# Patient Record
Sex: FEMALE | Race: WHITE | NOT HISPANIC OR LATINO | Employment: OTHER | URBAN - METROPOLITAN AREA
[De-identification: names, ages, dates, MRNs, and addresses within clinical notes are randomized per-mention and may not be internally consistent; named-entity substitution may affect disease eponyms.]

---

## 2017-01-12 ENCOUNTER — TELEPHONE (OUTPATIENT)
Dept: INTERNAL MEDICINE | Facility: CLINIC | Age: 67
End: 2017-01-12

## 2017-01-12 RX ORDER — CIPROFLOXACIN 500 MG/1
500 TABLET, FILM COATED ORAL 2 TIMES DAILY
Qty: 14 TABLET | Refills: 0 | Status: SHIPPED | OUTPATIENT
Start: 2017-01-12 | End: 2017-01-19

## 2017-01-12 NOTE — TELEPHONE ENCOUNTER
Pt called to say she is having a flare up of her diverticulitis with lower left quad pain with low grade fever. She would like you to call something in for her. i encouraged her to go to UC but she wanted to hear from you first.

## 2017-01-12 NOTE — TELEPHONE ENCOUNTER
Per Dr Gutierrez  Pt was given cipro 500 #14 and an appt for Friday at 7.30am  Pt informed and understands

## 2017-01-13 ENCOUNTER — OFFICE VISIT (OUTPATIENT)
Dept: INTERNAL MEDICINE | Facility: CLINIC | Age: 67
End: 2017-01-13

## 2017-01-13 VITALS
BODY MASS INDEX: 19.02 KG/M2 | HEART RATE: 68 BPM | OXYGEN SATURATION: 98 % | WEIGHT: 104 LBS | SYSTOLIC BLOOD PRESSURE: 112 MMHG | DIASTOLIC BLOOD PRESSURE: 70 MMHG

## 2017-01-13 DIAGNOSIS — K57.92 DIVERTICULITIS OF INTESTINE WITHOUT PERFORATION OR ABSCESS WITHOUT BLEEDING, UNSPECIFIED PART OF INTESTINAL TRACT: Primary | ICD-10-CM

## 2017-01-13 DIAGNOSIS — Z00.00 HEALTHCARE MAINTENANCE: Primary | ICD-10-CM

## 2017-01-13 LAB
HCT VFR BLDA CALC: 40.1 %
HGB BLDA-MCNC: 12.7 G/DL
LYMPHOCYTES # BLD: 23.2 %
MCH, POC: 29.5
MCHC, POC: 31.8
MCV, POC: 92.8
MONOCYTES # BLD: 5.8 %
PLATELET # BLD: 281 10*3/MM3
PMV BLD: 7.4 FL
POC NEUTROPHIL: 71 %
RBC, POC: 4.32
RDW, POC: 13.4
WBC # BLD: 9.1 10*3/UL

## 2017-01-13 PROCEDURE — 36415 COLL VENOUS BLD VENIPUNCTURE: CPT | Performed by: INTERNAL MEDICINE

## 2017-01-13 PROCEDURE — 99213 OFFICE O/P EST LOW 20 MIN: CPT | Performed by: INTERNAL MEDICINE

## 2017-01-13 PROCEDURE — 85025 COMPLETE CBC W/AUTO DIFF WBC: CPT | Performed by: INTERNAL MEDICINE

## 2017-01-13 NOTE — MR AVS SNAPSHOT
Eve Hurley   1/13/2017 2:00 PM   Office Visit    Dept Phone:  [phone]   Encounter #:  [id number]    Provider:  Gladys Gutierrez MD   Department:  [facility] INTERNAL MEDICINE                Your Full Care Plan              Your Updated Medication List          This list is accurate as of: 1/13/17  8:26 AM.  Always use your most recent med list.                acetaminophen 500 MG tablet   Commonly known as:  TYLENOL       ANALPRAM HC 2.5-1 % rectal cream   Generic drug:  hydrocortisone-pramoxine   INSERT INTO THE RECTUM 3 (THREE) TIMES A DAY.       calcium carbonate  MG chewable tablet   Commonly known as:  TUMS EX       CALTRATE 600+D PLUS MINERALS PO       ciprofloxacin 500 MG tablet   Commonly known as:  CIPRO   Take 1 tablet by mouth 2 (Two) Times a Day for 7 days.       denosumab 60 MG/ML solution syringe   Commonly known as:  PROLIA       desonide 0.05 % cream   Commonly known as:  DESOWEN   Apply  topically 2 (two) times a day. Prn vaginal dryness       Flaxseed Oil 1000 MG capsule       levothyroxine 75 MCG tablet   Commonly known as:  SYNTHROID, LEVOTHROID       MULTIVITAMINS PO       polycarbophil 625 MG tablet   Commonly known as:  FIBERCON       Unable to find       vitamin B-12 1000 MCG tablet   Commonly known as:  CYANOCOBALAMIN       Vitamin D3 2000 UNITS capsule               You Were Diagnosed With        Codes Comments    Diverticulitis of intestine without perforation or abscess without bleeding, unspecified part of intestinal tract    -  Primary ICD-10-CM: K57.92  ICD-9-CM: 562.11       Instructions     None    Patient Instructions History      Upcoming Appointments     Visit Type Date Time Department    OFFICE VISIT 1/13/2017  2:00 PM [facility]    [facility] 3/17/2017 10:00 AM [facility]    PHYSICAL 3/24/2017  3:30 PM [facility]    INJECTION 4/27/2017  8:30 AM  [facility] INFU NON ONC      MyChart Signup     Our records indicate  that you have an active [facility] [employer] account.    You can view your After Visit Summary by going to Meta and logging in with your [employer] username and password.  If you don't have a [employer] username and password but a parent or guardian has access to your record, the parent or guardian should login with their own [employer] username and password and access your record to view the After Visit Summary.    If you have questions, you can email [email or web address] or call [phone] to talk to our [employer] staff.  Remember, [employer] is NOT to be used for urgent needs.  For medical emergencies, dial 911.               Other Info from Your Visit           Your Appointments     Jan 13, 2017  2:00 PM EST   Office Visit with Gladys Gutierrez MD   [facility] INTERNAL MEDICINE (--)    [address] [id number] [phone]           Arrive 15 minutes prior to appointment.            Mar 17, 2017 10:00 AM EDT   [facility] with [facility] INTERNAL MEDICINE (--)    [address] [address] [phone]            Mar 24, 2017  3:30 PM EDT   Physical with Gladys Gutierrez MD   [facility] INTERNAL MEDICINE (--)    [address] [address] [phone]           Arrive 15 minutes prior to appointment.            Apr 27, 2017  8:30 AM EDT   Injection with ROOM 2 [facility]    [address] [facility] [address] [phone]              Allergies     Penicillins      Sulfa Antibiotics        Reason for Visit     Diverticulitis           Vital Signs     Blood Pressure Pulse Weight Oxygen Saturation Body Mass Index Smoking Status    112/70 68 104 lb (47.2 kg) 98% 19.02 kg/m2 Former Smoker      Problems and Diagnoses Noted     Diverticulitis of intestine without perforation or abscess without bleeding

## 2017-03-17 DIAGNOSIS — Z00.00 HEALTH CARE MAINTENANCE: Primary | ICD-10-CM

## 2017-03-18 LAB
ALBUMIN SERPL-MCNC: 4.7 G/DL (ref 3.5–5.2)
ALBUMIN/GLOB SERPL: 2.1 G/DL
ALP SERPL-CCNC: 34 U/L (ref 39–117)
ALT SERPL-CCNC: 16 U/L (ref 1–33)
APPEARANCE UR: CLEAR
AST SERPL-CCNC: 22 U/L (ref 1–32)
BACTERIA #/AREA URNS HPF: NORMAL /HPF
BASOPHILS # BLD AUTO: 0.06 10*3/MM3 (ref 0–0.2)
BASOPHILS NFR BLD AUTO: 0.7 % (ref 0–1.5)
BILIRUB SERPL-MCNC: 0.4 MG/DL (ref 0.1–1.2)
BILIRUB UR QL STRIP: NEGATIVE
BUN SERPL-MCNC: 8 MG/DL (ref 8–23)
BUN/CREAT SERPL: 8.5 (ref 7–25)
CALCIUM SERPL-MCNC: 10.1 MG/DL (ref 8.6–10.5)
CHLORIDE SERPL-SCNC: 101 MMOL/L (ref 98–107)
CHOLEST SERPL-MCNC: 206 MG/DL (ref 0–200)
CO2 SERPL-SCNC: 26.6 MMOL/L (ref 22–29)
COLOR UR: YELLOW
CREAT SERPL-MCNC: 0.94 MG/DL (ref 0.57–1)
EOSINOPHIL # BLD AUTO: 0.25 10*3/MM3 (ref 0–0.7)
EOSINOPHIL NFR BLD AUTO: 2.8 % (ref 0.3–6.2)
EPI CELLS #/AREA URNS HPF: NORMAL /HPF
ERYTHROCYTE [DISTWIDTH] IN BLOOD BY AUTOMATED COUNT: 13.3 % (ref 11.7–13)
GLOBULIN SER CALC-MCNC: 2.2 GM/DL
GLUCOSE SERPL-MCNC: 83 MG/DL (ref 65–99)
GLUCOSE UR QL: NEGATIVE
HCT VFR BLD AUTO: 42.4 % (ref 35.6–45.5)
HDLC SERPL-MCNC: 125 MG/DL (ref 40–60)
HGB BLD-MCNC: 13.8 G/DL (ref 11.9–15.5)
HGB UR QL STRIP: NEGATIVE
IMM GRANULOCYTES # BLD: 0.03 10*3/MM3 (ref 0–0.03)
IMM GRANULOCYTES NFR BLD: 0.3 % (ref 0–0.5)
KETONES UR QL STRIP: NEGATIVE
LDLC SERPL CALC-MCNC: 66 MG/DL (ref 0–100)
LEUKOCYTE ESTERASE UR QL STRIP: NEGATIVE
LYMPHOCYTES # BLD AUTO: 2.86 10*3/MM3 (ref 0.9–4.8)
LYMPHOCYTES NFR BLD AUTO: 31.8 % (ref 19.6–45.3)
MCH RBC QN AUTO: 31.2 PG (ref 26.9–32)
MCHC RBC AUTO-ENTMCNC: 32.5 G/DL (ref 32.4–36.3)
MCV RBC AUTO: 95.7 FL (ref 80.5–98.2)
MICRO URNS: ABNORMAL
MICRO URNS: ABNORMAL
MONOCYTES # BLD AUTO: 0.58 10*3/MM3 (ref 0.2–1.2)
MONOCYTES NFR BLD AUTO: 6.4 % (ref 5–12)
NEUTROPHILS # BLD AUTO: 5.22 10*3/MM3 (ref 1.9–8.1)
NEUTROPHILS NFR BLD AUTO: 58 % (ref 42.7–76)
NITRITE UR QL STRIP: NEGATIVE
PH UR STRIP: 7 [PH] (ref 5–7.5)
PLATELET # BLD AUTO: 351 10*3/MM3 (ref 140–500)
POTASSIUM SERPL-SCNC: 4.2 MMOL/L (ref 3.5–5.2)
PROT SERPL-MCNC: 6.9 G/DL (ref 6–8.5)
PROT UR QL STRIP: NEGATIVE
RBC # BLD AUTO: 4.43 10*6/MM3 (ref 3.9–5.2)
RBC #/AREA URNS HPF: NORMAL /HPF
SODIUM SERPL-SCNC: 142 MMOL/L (ref 136–145)
SP GR UR: <=1.005 (ref 1–1.03)
TRIGL SERPL-MCNC: 74 MG/DL (ref 0–150)
TSH SERPL DL<=0.005 MIU/L-ACNC: 1.53 MIU/ML (ref 0.27–4.2)
URINALYSIS REFLEX: ABNORMAL
UROBILINOGEN UR STRIP-MCNC: 0.2 MG/DL (ref 0.2–1)
VLDLC SERPL CALC-MCNC: 14.8 MG/DL (ref 5–40)
WBC # BLD AUTO: 9 10*3/MM3 (ref 4.5–10.7)
WBC #/AREA URNS HPF: NORMAL /HPF

## 2017-03-20 NOTE — PROGRESS NOTES
Your laboratory results are NORMAL. We will discuss these results in detail at your next office visit. Please call with any questions or concerns.  Sincerely,  Gladys Gutierrez MD

## 2017-03-24 ENCOUNTER — OFFICE VISIT (OUTPATIENT)
Dept: INTERNAL MEDICINE | Facility: CLINIC | Age: 67
End: 2017-03-24

## 2017-03-24 VITALS
BODY MASS INDEX: 19.32 KG/M2 | HEART RATE: 76 BPM | OXYGEN SATURATION: 99 % | HEIGHT: 62 IN | WEIGHT: 105 LBS | SYSTOLIC BLOOD PRESSURE: 112 MMHG | DIASTOLIC BLOOD PRESSURE: 60 MMHG

## 2017-03-24 DIAGNOSIS — E03.9 HYPOTHYROIDISM, UNSPECIFIED TYPE: Primary | ICD-10-CM

## 2017-03-24 DIAGNOSIS — M54.2 NECK PAIN: ICD-10-CM

## 2017-03-24 DIAGNOSIS — Z00.00 HEALTHCARE MAINTENANCE: ICD-10-CM

## 2017-03-24 PROCEDURE — 99397 PER PM REEVAL EST PAT 65+ YR: CPT | Performed by: INTERNAL MEDICINE

## 2017-03-24 PROCEDURE — 93000 ELECTROCARDIOGRAM COMPLETE: CPT | Performed by: INTERNAL MEDICINE

## 2017-03-24 RX ORDER — DESONIDE 0.5 MG/G
CREAM TOPICAL 2 TIMES DAILY
Qty: 15 G | Refills: 3 | Status: SHIPPED | OUTPATIENT
Start: 2017-03-24 | End: 2017-07-18

## 2017-03-24 NOTE — PROGRESS NOTES
Subjective     Eve Hurley is a 67 y.o. female who presents for a complete physical exam.  She is doing well today. She has a history of hypothyroidism, htn, and osteoporosis. Thyroid is now euthyroid. She is on prolia for osteoporosis and is consuming vit d and calcium in her diet. She does engage in weight bearing exercises. She has some mild neck discomfort that is most notable while in bed but absent when doing yoga. bp is normotensive.       Review of Systems   Constitutional: Negative.    HENT: Negative.    Eyes: Negative.    Respiratory: Negative.    Cardiovascular: Negative.    Gastrointestinal: Negative.    Endocrine: Negative.    Genitourinary: Negative.    Musculoskeletal: Positive for arthralgias and neck pain.        Tylenol qd for bursitis hip and other arthralgias and does well w/ tyl and yoga   Skin: Negative.    Allergic/Immunologic: Negative.    Neurological: Negative.    Hematological: Negative.    Psychiatric/Behavioral: Negative.        The following portions of the patient's history were reviewed and updated as appropriate: allergies, current medications, past family history, past medical history, past social history, past surgical history and problem list.     Patient Active Problem List   Diagnosis   • Hypothyroidism   • Arthralgia of hip   • Osteoporosis   • Hearing difficulty   • Rectal bleeding   • Diverticulitis of intestine without perforation or abscess without bleeding       Past Medical History:   Diagnosis Date   • Disease of thyroid gland    • Hypertension    • Osteoporosis        Past Surgical History:   Procedure Laterality Date   •  SECTION     • HEMORRHOIDECTOMY     • TUBAL ABDOMINAL LIGATION         Family History   Problem Relation Age of Onset   • Coronary artery disease Father    • Hyperthyroidism Brother    • Diabetes Daughter    • Coronary artery disease Maternal Grandmother        Social History     Social History   • Marital status:      Spouse name:  "N/A   • Number of children: N/A   • Years of education: N/A     Occupational History   • Not on file.     Social History Main Topics   • Smoking status: Former Smoker   • Smokeless tobacco: Never Used   • Alcohol use 1.2 oz/week     2 Glasses of wine per week   • Drug use: No   • Sexual activity: Not on file     Other Topics Concern   • Not on file     Social History Narrative       Current Outpatient Prescriptions on File Prior to Visit   Medication Sig Dispense Refill   • calcium carbonate EX (TUMS EX) 750 MG chewable tablet Chew 750 mg 4 (four) times a day.     • Calcium Carbonate-Vit D-Min (CALTRATE 600+D PLUS MINERALS PO) Take 1,600 Units by mouth daily.     • denosumab (PROLIA) 60 MG/ML solution syringe Inject 60 mg under the skin every 6 (six) months.     • Flaxseed, Linseed, (FLAXSEED OIL) 1000 MG capsule Take  by mouth.     • levothyroxine (SYNTHROID, LEVOTHROID) 75 MCG tablet Take  by mouth.     • Multiple Vitamin (MULTIVITAMINS PO) Take  by mouth.     • polycarbophil (FIBERCON) 625 MG tablet Take 625 mg by mouth daily.     • vitamin B-12 (CYANOCOBALAMIN) 1000 MCG tablet Take  by mouth.     • acetaminophen (TYLENOL) 500 MG tablet Take 1,000 mg by mouth every day.     • ANALPRAM HC 2.5-1 % rectal cream INSERT INTO THE RECTUM 3 (THREE) TIMES A DAY. 30 g 3   • Cholecalciferol (VITAMIN D3) 2000 UNITS capsule Take  by mouth.     • desonide (DESOWEN) 0.05 % cream Apply  topically 2 (two) times a day. Prn vaginal dryness 30 g 0   • Unable to find 1 each 1 (one) time. Algae Omega 715 mg 2 per day Mon-Wed-Fri       No current facility-administered medications on file prior to visit.        Allergies   Allergen Reactions   • Penicillins    • Sulfa Antibiotics        Immunization History   Administered Date(s) Administered   • Hepatitis A 09/10/2012, 04/04/2013   • Tdap 09/10/2012   • Zoster 09/10/2012       Objective     /60  Pulse 76  Ht 62\" (157.5 cm)  Wt 105 lb (47.6 kg)  SpO2 99%  BMI 19.2 " kg/m2    Physical Exam   Constitutional: She is oriented to person, place, and time. She appears well-developed and well-nourished.   HENT:   Head: Normocephalic and atraumatic.   Right Ear: External ear normal.   Left Ear: External ear normal.   Nose: Nose normal.   Mouth/Throat: Oropharynx is clear and moist.   Eyes: Conjunctivae and EOM are normal. Pupils are equal, round, and reactive to light.   Neck: Normal range of motion. Neck supple.   Cardiovascular: Normal rate, regular rhythm and normal heart sounds.    Pulmonary/Chest: Effort normal and breath sounds normal. No respiratory distress.   Abdominal: Soft. Bowel sounds are normal.   Genitourinary: Rectal exam shows guaiac negative stool. No vaginal discharge found.   Genitourinary Comments: Vaginal atrophy and lichenification changes.   Musculoskeletal: Normal range of motion.   Neurological: She is alert and oriented to person, place, and time.   Skin: Skin is warm and dry.   Psychiatric: She has a normal mood and affect. Her behavior is normal. Judgment and thought content normal.   Nursing note and vitals reviewed.  EKG for hypothyroidism. Sinus. No st changes. Unchanged from prior tracing.       Assessment/Plan   Eve was seen today for annual exam.    Diagnoses and all orders for this visit:    Hypothyroidism, unspecified type  -     ECG 12 Lead    Healthcare maintenance    Neck pain        Discussion    Patient presents today for a CPE.  Patient follows a healthy diet.   Patient follows an adequate exercise regimen. Mammogram is up to date.   Colon cancer screening is up to date.   Immunizations are up to date.   DEXA is up to date.  prevnar today. Pneumovax in 1 year. Hep c testing w next labs. Continue healthy diet w/ routine fitness. Continue synthroid for thyroid supplementation. Continue prolia for osteoporosis and f/u w/ dr. Dupree routinely. Will forward labs which were reviewed in detail w/ patient. neck pain appears muscular in nature. She  declines a PT referral. She is advised appropriate stretching and neck positioning. She is about to retire and this may be beneficial. Will take tylenol prn. Desonide ointment for vaginal lichen. She will have derm examine as well. F/u 1 yr or prn.          Future Appointments  Date Time Provider Department Center   4/27/2017 8:30 AM ROOM 2 [facility]  [facility]ONES

## 2017-04-25 ENCOUNTER — TELEPHONE (OUTPATIENT)
Dept: INTERNAL MEDICINE | Facility: CLINIC | Age: 67
End: 2017-04-25

## 2017-04-25 DIAGNOSIS — M81.0 OSTEOPOROSIS: Primary | ICD-10-CM

## 2017-04-26 PROBLEM — M81.0 SENILE OSTEOPOROSIS: Status: ACTIVE | Noted: 2017-04-26

## 2017-04-27 ENCOUNTER — HOSPITAL ENCOUNTER (OUTPATIENT)
Dept: INFUSION THERAPY | Facility: HOSPITAL | Age: 67
Discharge: HOME | End: 2017-04-27
Admitting: INTERNAL MEDICINE

## 2017-04-27 VITALS
OXYGEN SATURATION: 100 % | BODY MASS INDEX: 19.32 KG/M2 | RESPIRATION RATE: 20 BRPM | HEIGHT: 62 IN | WEIGHT: 105 LBS | TEMPERATURE: 96.9 F | SYSTOLIC BLOOD PRESSURE: 140 MMHG | HEART RATE: 74 BPM | DIASTOLIC BLOOD PRESSURE: 74 MMHG

## 2017-04-27 DIAGNOSIS — M81.0 SENILE OSTEOPOROSIS: ICD-10-CM

## 2017-04-27 DIAGNOSIS — M81.0 OSTEOPOROSIS: Primary | ICD-10-CM

## 2017-04-27 PROCEDURE — 96401 CHEMO ANTI-NEOPL SQ/IM: CPT

## 2017-04-27 PROCEDURE — 25010000002 DENOSUMAB 60 MG/ML SOLUTION: Performed by: INTERNAL MEDICINE

## 2017-04-27 RX ADMIN — DENOSUMAB 60 MG: 60 INJECTION SUBCUTANEOUS at 08:38

## 2017-07-18 ENCOUNTER — OFFICE VISIT (OUTPATIENT)
Dept: INTERNAL MEDICINE | Facility: CLINIC | Age: 67
End: 2017-07-18

## 2017-07-18 ENCOUNTER — HOSPITAL ENCOUNTER (OUTPATIENT)
Dept: CT IMAGING | Facility: HOSPITAL | Age: 67
Discharge: HOME | End: 2017-07-18
Admitting: INTERNAL MEDICINE

## 2017-07-18 VITALS
TEMPERATURE: 97.7 F | HEART RATE: 72 BPM | OXYGEN SATURATION: 98 % | WEIGHT: 103 LBS | SYSTOLIC BLOOD PRESSURE: 112 MMHG | BODY MASS INDEX: 18.84 KG/M2 | DIASTOLIC BLOOD PRESSURE: 66 MMHG

## 2017-07-18 DIAGNOSIS — R50.9 FEVER, UNSPECIFIED FEVER CAUSE: ICD-10-CM

## 2017-07-18 DIAGNOSIS — R10.31 RIGHT LOWER QUADRANT ABDOMINAL PAIN: ICD-10-CM

## 2017-07-18 DIAGNOSIS — K57.32 DIVERTICULITIS OF LARGE INTESTINE WITHOUT PERFORATION OR ABSCESS WITHOUT BLEEDING: Primary | ICD-10-CM

## 2017-07-18 DIAGNOSIS — R93.5 ABNORMAL ABDOMINAL CT SCAN: ICD-10-CM

## 2017-07-18 DIAGNOSIS — R10.31 RIGHT LOWER QUADRANT ABDOMINAL PAIN: Primary | ICD-10-CM

## 2017-07-18 LAB
HCT VFR BLDA CALC: 41.6 %
HGB BLDA-MCNC: 13.4 G/DL
LYMPHOCYTES # BLD: 35.5 %
MCH, POC: 30.4
MCHC, POC: 32.2
MCV, POC: 94.5
MONOCYTES # BLD: 2.1 %
PLATELET # BLD: 304 10*3/MM3
PMV BLD: 7.3 FL
POC NEUTROPHIL: 62.4 %
RBC, POC: 4.4
RDW, POC: 14.1
WBC # BLD: 8.4 10*3/UL

## 2017-07-18 PROCEDURE — 85025 COMPLETE CBC W/AUTO DIFF WBC: CPT | Performed by: INTERNAL MEDICINE

## 2017-07-18 PROCEDURE — 0 DIATRIZOATE MEGLUMINE & SODIUM PER 1 ML: Performed by: INTERNAL MEDICINE

## 2017-07-18 PROCEDURE — 99214 OFFICE O/P EST MOD 30 MIN: CPT | Performed by: INTERNAL MEDICINE

## 2017-07-18 PROCEDURE — 74176 CT ABD & PELVIS W/O CONTRAST: CPT

## 2017-07-18 RX ORDER — CIPROFLOXACIN 500 MG/1
500 TABLET, FILM COATED ORAL 2 TIMES DAILY
Qty: 14 TABLET | Refills: 0 | Status: SHIPPED | OUTPATIENT
Start: 2017-07-18 | End: 2017-07-25

## 2017-07-18 RX ORDER — METRONIDAZOLE 500 MG/1
500 TABLET ORAL 3 TIMES DAILY
Qty: 21 TABLET | Refills: 0 | Status: SHIPPED | OUTPATIENT
Start: 2017-07-18 | End: 2017-08-23

## 2017-07-18 RX ADMIN — DIATRIZOATE MEGLUMINE AND DIATRIZOATE SODIUM 30 ML: 660; 100 LIQUID ORAL; RECTAL at 15:30

## 2017-07-18 NOTE — PROGRESS NOTES
Chief Complaint   Patient presents with   • Diarrhea   • Abdominal Pain   • Fever     in the evening       History of Present Illness   Eve Hurley is a 67 y.o. female presents for acute care w/ new issue. approx 10 d h/o gi discomfort. Worsening over last several days. Had 24 hours of very loose bm. Now she is having 2-3 loose bm/ d. This has improved. She is having abdominal pain diffusely in the low abdominal region LLQ greatest area of involvement. Tmax 101 and did have a fever last night. She has had tylenol today. Patient has a history of diverticulitis. Last flair was 1/17. Treated w/ cipro.       The following portions of the patient's history were reviewed and updated as appropriate: allergies, current medications, past family history, past medical history, past social history, past surgical history and problem list.  Current Outpatient Prescriptions on File Prior to Visit   Medication Sig Dispense Refill   • acetaminophen (TYLENOL) 500 MG tablet Take 1,000 mg by mouth every day.     • ANALPRAM HC 2.5-1 % rectal cream INSERT INTO THE RECTUM 3 (THREE) TIMES A DAY. 30 g 3   • calcium carbonate EX (TUMS EX) 750 MG chewable tablet Chew 750 mg 4 (four) times a day.     • Calcium Carbonate-Vit D-Min (CALTRATE 600+D PLUS MINERALS PO) Take 1,600 Units by mouth daily.     • Cholecalciferol (VITAMIN D3) 2000 UNITS capsule Take  by mouth.     • denosumab (PROLIA) 60 MG/ML solution syringe Inject 60 mg under the skin every 6 (six) months.     • Flaxseed, Linseed, (FLAXSEED OIL) 1000 MG capsule Take  by mouth.     • levothyroxine (SYNTHROID, LEVOTHROID) 75 MCG tablet Take  by mouth.     • Multiple Vitamin (MULTIVITAMINS PO) Take  by mouth.     • polycarbophil (FIBERCON) 625 MG tablet Take 625 mg by mouth daily.     • vitamin B-12 (CYANOCOBALAMIN) 1000 MCG tablet Take  by mouth.     • desonide (DESOWEN) 0.05 % cream Apply  topically 2 (two) times a day. Prn vaginal dryness 30 g 0   • Unable to find 1 each 1 (one)  time. Algae Omega 715 mg 2 per day Mon-Wed-Fri     • [DISCONTINUED] desonide (DESOWEN) 0.05 % cream Apply  topically 2 (Two) Times a Day. 15 g 3     No current facility-administered medications on file prior to visit.      Review of Systems   Constitutional: Positive for fatigue and fever.   HENT: Negative.    Eyes: Negative.    Respiratory: Negative.    Cardiovascular: Negative.    Gastrointestinal: Positive for abdominal pain.   Endocrine: Negative.    Genitourinary: Negative.    Musculoskeletal: Negative.    Skin: Negative.    Neurological: Negative.    Hematological: Negative.    Psychiatric/Behavioral: Negative.        Objective   Physical Exam   Constitutional: She is oriented to person, place, and time. She appears well-developed and well-nourished.   HENT:   Head: Normocephalic and atraumatic.   Right Ear: External ear normal.   Left Ear: External ear normal.   Nose: Nose normal.   Mouth/Throat: Oropharynx is clear and moist.   Eyes: EOM are normal. Pupils are equal, round, and reactive to light.   Neck: Neck supple.   Cardiovascular: Normal rate, regular rhythm and normal heart sounds.    Pulmonary/Chest: Effort normal and breath sounds normal. No respiratory distress.   Abdominal: Soft. Bowel sounds are normal. There is tenderness. There is no rebound and no guarding.   Tenderness in lower abdomen R>L   Musculoskeletal: Normal range of motion.   Neurological: She is alert and oriented to person, place, and time.   Skin: Skin is warm and dry.   Psychiatric: She has a normal mood and affect. Her behavior is normal. Thought content normal.   Nursing note and vitals reviewed.       /66  Pulse 72  Temp 97.7 °F (36.5 °C)  Wt 103 lb (46.7 kg)  SpO2 98%  BMI 18.84 kg/m2    Assessment/Plan   Diagnoses and all orders for this visit:    Right lower quadrant abdominal pain  -     CT Abdomen Pelvis Without Contrast; Future  -     POC CBC With / Auto Diff    Fever, unspecified fever cause  -     CT Abdomen  Pelvis Without Contrast; Future  -     POC CBC With / Auto Diff    Other orders  -     ciprofloxacin (CIPRO) 500 MG tablet; Take 1 tablet by mouth 2 (Two) Times a Day for 7 days.  -     metroNIDAZOLE (FLAGYL) 500 MG tablet; Take 1 tablet by mouth 3 (Three) Times a Day.    Abdominal pain. Likely repeat diverticulitis. Cbc is wnl. She will be sent for ct abdomen given diffuse tenderness and recurrent nature. Will treat w/ cipro and flagyl if it is diverticulitis. Will refer to surgeon for repeat cscope/ possible partial colectomy given recurrent nature.     ADDENDUM  CT abdomen verbal report w/ diverticuli and likely diverticulitis. Also noted is cecal thickeninc w/ ? Mass appearance. Patient will complete antibiotics. Possibly have repeat ct scan but will get surgical referral as discussed. Patient is aware of results and plans.

## 2017-07-24 ENCOUNTER — TELEPHONE (OUTPATIENT)
Dept: INTERNAL MEDICINE | Facility: CLINIC | Age: 67
End: 2017-07-24

## 2017-07-24 NOTE — TELEPHONE ENCOUNTER
Pt is taking Cipro and is do to stop tomorrow  Wants to stop today as the diarrhea and nausea is to much.

## 2017-08-17 ENCOUNTER — TELEPHONE (OUTPATIENT)
Dept: INTERNAL MEDICINE | Facility: CLINIC | Age: 67
End: 2017-08-17

## 2017-08-17 NOTE — TELEPHONE ENCOUNTER
Pt called to say that she had an appt with her Colon Rectal Surgeon but it had to be cancelled and now they are unsure when they can get her in.(?) they gave her an appt for 23rd of August but they told her that could change. Pt states she is afraid to keep waiting to check on the mass they found in th CT. Would like to be given another drs name. She is waiting to see Dr Dunbar.

## 2017-08-17 NOTE — TELEPHONE ENCOUNTER
Marito,  Just making sure you will be able to see patient? I think she needed to be rescheduled and is really worried about her CT findings. Thanks again.  Gladys

## 2017-08-17 NOTE — TELEPHONE ENCOUNTER
Pt informed of message and will let me know if she gets reschedules again and per Matt she will schedule with Valerie or Tanvi

## 2017-08-23 ENCOUNTER — OFFICE VISIT (OUTPATIENT)
Dept: SURGERY | Facility: CLINIC | Age: 67
End: 2017-08-23

## 2017-08-23 VITALS
HEART RATE: 97 BPM | TEMPERATURE: 98.2 F | HEIGHT: 62 IN | BODY MASS INDEX: 19.54 KG/M2 | WEIGHT: 106.2 LBS | SYSTOLIC BLOOD PRESSURE: 110 MMHG | OXYGEN SATURATION: 97 % | DIASTOLIC BLOOD PRESSURE: 74 MMHG

## 2017-08-23 DIAGNOSIS — R93.5 ABNORMAL CT OF THE ABDOMEN: ICD-10-CM

## 2017-08-23 DIAGNOSIS — K57.32 DIVERTICULITIS OF LARGE INTESTINE WITHOUT PERFORATION OR ABSCESS WITHOUT BLEEDING: Primary | ICD-10-CM

## 2017-08-23 PROCEDURE — 99204 OFFICE O/P NEW MOD 45 MIN: CPT | Performed by: COLON & RECTAL SURGERY

## 2017-08-23 RX ORDER — SODIUM CHLORIDE, SODIUM LACTATE, POTASSIUM CHLORIDE, CALCIUM CHLORIDE 600; 310; 30; 20 MG/100ML; MG/100ML; MG/100ML; MG/100ML
30 INJECTION, SOLUTION INTRAVENOUS CONTINUOUS
Status: CANCELLED | OUTPATIENT
Start: 2017-08-30

## 2017-08-23 RX ORDER — SODIUM CHLORIDE 0.9 % (FLUSH) 0.9 %
1-10 SYRINGE (ML) INJECTION AS NEEDED
Status: CANCELLED | OUTPATIENT
Start: 2017-08-30

## 2017-08-23 NOTE — PROGRESS NOTES
Eve Hurley is a 67 y.o. female who is seen as a consult at the request of Gladys Gutierrez MD for abnl CT scan    HPI:    Pt has had 4-5 episodes diverticulitis in the past 20 years    2 episodes this year, most recently 2017    She has LLQ pain, fever, bloated, crampy during the episode but resolved  She had nausea this past episode; does not usually. No vomiting  Had frequent, loose BMs for 24 hours  She thought she noted some blood per rectum this episode, but she was eating red popsicles  Low-grade fever this episode and episode at the beginning of the year    She thought it was a GI bug    Went to PCP Dr. Gutierrez, who ordered CT: showed diverticulitis, cecal abnormality, sigmoid thickening  Dr. Gutierrez prescribed flagyl    Was only admitted for diverticulitis 1st episode: -sherman: IV abx  Has been treated outpatient with antibiotics since then    Does still have occasional LLQ pain & RLQ pain: comes and goes  Most recently last week: about 24 hours    Most recent colonoscopy  Dr. 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    • Fibrocystic breast    • Hearing difficulty    • Hip joint pain    • Hypertension    • Left  lower quadrant pain 2004    CT SCAN AT [facility] SHOWED ESSENTIALLY NORMAL EXCEPT 2 VERY SMALL HEPATIC CYSTS AND CYSTIC STRUCTURE ON THE LEFT SIDE OF OF THE PELVIS REPRESENTING A 2CM OVARIAN CYST   • Lower extremity pain, left 06/15/2012    VENOUS DOPLER AT [facility] WAS WNL   • Osteoporosis     GETS PROLIA INJECTIONS   • Perforated ear drum 2014    MICROPERFORATION, LEFT EAR, SAW DR. GREGORY LAGOS   • PONV (postoperative nausea and vomiting)    • Rectal bleed 03/15/2016   • Renal insufficiency 2016   • Vegetarian     VEGAN       Past Surgical History:   Procedure Laterality Date   • BREAST SURGERY Right     CYST ASPIRATION   •  SECTION N/A    • COLONOSCOPY N/A 2004    DIVERTICULOSIS, REDUNDANT SIGMOID, DR. KELLEN ROBERTS AT [facility]   • HEMORRHOIDECTOMY N/A 2009    DR. KELLEN ROBERTS AT [facility]   • TUBAL ABDOMINAL LIGATION Bilateral        Social History:   reports that she has quit smoking. Her smoking use included Cigarettes. She has a 15.00 pack-year smoking history. She has never used smokeless tobacco. She reports that she drinks about 1.2 oz of alcohol per week  She reports that she does not use illicit drugs.    Marriage status:     Family History   Problem Relation Age of Onset   • Coronary artery disease Father    • Heart disease Father    • Hypertension Father    • Arthritis Father    • Alcohol abuse Father    • Hyperthyroidism Brother    • Thyroid disease Brother    • Diabetes Daughter    • Coronary artery disease Maternal Grandmother    • Arthritis Maternal Grandmother    • Asthma Maternal Grandmother    • ALS Mother    • Depression Mother    • Early death Mother    • Thyroid disease Sister    • Heart disease Maternal Grandfather    • Alcohol abuse Maternal Grandfather    • Arthritis Paternal Grandmother    • Diabetes Paternal Grandmother    • Alcohol abuse Paternal Grandfather    • Macular degeneration Paternal Grandfather    • Heart attack Paternal Grandfather    •  Alcohol abuse Sister    • Depression Sister          Current Outpatient Prescriptions:   •  acetaminophen (TYLENOL) 500 MG tablet, Take 1,000 mg by mouth every day., Disp: , Rfl:   •  calcium carbonate EX (TUMS EX) 750 MG chewable tablet, Chew 750 mg 4 (four) times a day., Disp: , Rfl:   •  Calcium Carbonate-Vit D-Min (CALTRATE 600+D PLUS MINERALS PO), Take 1,600 Units by mouth daily., Disp: , Rfl:   •  Cholecalciferol (VITAMIN D3) 2000 UNITS capsule, Take  by mouth., Disp: , Rfl:   •  denosumab (PROLIA) 60 MG/ML solution syringe, Inject 60 mg under the skin every 6 (six) months., Disp: , Rfl:   •  Flaxseed, Linseed, (FLAXSEED OIL) 1000 MG capsule, Take  by mouth., Disp: , Rfl:   •  levothyroxine (SYNTHROID, LEVOTHROID) 75 MCG tablet, Take  by mouth., Disp: , Rfl:   •  Multiple Vitamin (MULTIVITAMINS PO), Take  by mouth., Disp: , Rfl:   •  polycarbophil (FIBERCON) 625 MG tablet, Take 625 mg by mouth daily., Disp: , Rfl:   •  Probiotic Product (PROBIOTIC DAILY PO), , Disp: , Rfl:   •  Unable to find, 1 each 1 (one) time. Algae Omega 715 mg 2 per day Mon-Wed-Fri, Disp: , Rfl:   •  vitamin B-12 (CYANOCOBALAMIN) 1000 MCG tablet, Take  by mouth., Disp: , Rfl:     Allergy  Sulfa antibiotics and Penicillins    Review of Systems   Constitution: Positive for decreased appetite and weight loss.   HENT: Negative for headaches.    Eyes: Negative.    Cardiovascular: Negative.    Respiratory: Negative.    Endocrine: Positive for cold intolerance.   Skin: Negative.    Musculoskeletal: Positive for joint pain and myalgias.   Gastrointestinal: Positive for anorexia and change in bowel habit. Negative for constipation, flatus, melena and vomiting.   Genitourinary: Negative for dysuria, frequency and hematuria.   Neurological: Positive for light-headedness.   Psychiatric/Behavioral: Negative.    Allergic/Immunologic: Negative.        Vitals:    08/23/17 1251   BP: 110/74   Pulse: 97   Temp: 98.2 °F (36.8 °C)   SpO2: 97%     Body mass  index is 19.42 kg/(m^2).    Physical Exam   Constitutional: She is oriented to person, place, and time. She appears well-developed and well-nourished. No distress.   thin   HENT:   Head: Normocephalic and atraumatic.   Nose: Nose normal.   Mouth/Throat: Oropharynx is clear and moist.   Eyes: Conjunctivae and EOM are normal. Pupils are equal, round, and reactive to light.   Neck: Normal range of motion. No tracheal deviation present.   Pulmonary/Chest: Effort normal and breath sounds normal. No respiratory distress.   Abdominal: Soft. Bowel sounds are normal. She exhibits no distension.   Musculoskeletal: Normal range of motion. She exhibits no edema or deformity.   Neurological: She is alert and oriented to person, place, and time. No cranial nerve deficit. Coordination and gait normal.   Skin: Skin is warm and dry.   Psychiatric: She has a normal mood and affect. Her behavior is normal. Judgment normal.       Review of Medical Record:  I independently viewed CT a/p July 2017: sigmoid thickening w/ diverticulitis. cecal  thickening  Reviewed Dr. Gutierrez office notes    Assessment:  1. Diverticulitis of large intestine without perforation or abscess without bleeding    2. Abnormal CT of the abdomen        Plan:    For the abnormal CT and history of diverticulitis, I recommend doing a colonoscopy.  I described the patient risks benefits and alternatives and she wished to proceed.    I also recommend fiber therapy and detailed and gave written instructions on how to achieve a high fiber diet: she can increase fiber supplement to bid.    Scribed for Marito Dunbar MD by Rosa Jin PA-C 8/23/2017   This patient was evaluated by me, recommendations made, documentation reviewed, edited, and revised by me, Marito Dunbar MD

## 2017-08-30 ENCOUNTER — ANESTHESIA EVENT (OUTPATIENT)
Dept: GASTROENTEROLOGY | Facility: HOSPITAL | Age: 67
End: 2017-08-30

## 2017-08-30 ENCOUNTER — HOSPITAL ENCOUNTER (OUTPATIENT)
Facility: HOSPITAL | Age: 67
Setting detail: HOSPITAL OUTPATIENT SURGERY
Discharge: HOME | End: 2017-08-30
Attending: COLON & RECTAL SURGERY | Admitting: COLON & RECTAL SURGERY

## 2017-08-30 ENCOUNTER — ANESTHESIA (OUTPATIENT)
Dept: GASTROENTEROLOGY | Facility: HOSPITAL | Age: 67
End: 2017-08-30

## 2017-08-30 VITALS
DIASTOLIC BLOOD PRESSURE: 67 MMHG | TEMPERATURE: 98.2 F | HEIGHT: 62 IN | HEART RATE: 64 BPM | WEIGHT: 103 LBS | SYSTOLIC BLOOD PRESSURE: 117 MMHG | OXYGEN SATURATION: 99 % | RESPIRATION RATE: 18 BRPM | BODY MASS INDEX: 18.95 KG/M2

## 2017-08-30 DIAGNOSIS — R93.5 ABNORMAL CT OF THE ABDOMEN: ICD-10-CM

## 2017-08-30 PROCEDURE — 25010000002 PROPOFOL 10 MG/ML EMULSION: Performed by: ANESTHESIOLOGY

## 2017-08-30 PROCEDURE — 45378 DIAGNOSTIC COLONOSCOPY: CPT | Performed by: COLON & RECTAL SURGERY

## 2017-08-30 RX ORDER — SODIUM CHLORIDE 0.9 % (FLUSH) 0.9 %
1-10 SYRINGE (ML) INJECTION AS NEEDED
Status: DISCONTINUED | OUTPATIENT
Start: 2017-08-30 | End: 2017-08-30 | Stop reason: HOSPADM

## 2017-08-30 RX ORDER — SODIUM CHLORIDE, SODIUM LACTATE, POTASSIUM CHLORIDE, CALCIUM CHLORIDE 600; 310; 30; 20 MG/100ML; MG/100ML; MG/100ML; MG/100ML
30 INJECTION, SOLUTION INTRAVENOUS CONTINUOUS
Status: DISCONTINUED | OUTPATIENT
Start: 2017-08-30 | End: 2017-08-30 | Stop reason: HOSPADM

## 2017-08-30 RX ORDER — LIDOCAINE HYDROCHLORIDE 20 MG/ML
INJECTION, SOLUTION INFILTRATION; PERINEURAL AS NEEDED
Status: DISCONTINUED | OUTPATIENT
Start: 2017-08-30 | End: 2017-08-30 | Stop reason: SURG

## 2017-08-30 RX ORDER — PROPOFOL 10 MG/ML
VIAL (ML) INTRAVENOUS AS NEEDED
Status: DISCONTINUED | OUTPATIENT
Start: 2017-08-30 | End: 2017-08-30 | Stop reason: SURG

## 2017-08-30 RX ORDER — PROPOFOL 10 MG/ML
VIAL (ML) INTRAVENOUS CONTINUOUS PRN
Status: DISCONTINUED | OUTPATIENT
Start: 2017-08-30 | End: 2017-08-30 | Stop reason: SURG

## 2017-08-30 RX ADMIN — PROPOFOL 50 MG: 10 INJECTION, EMULSION INTRAVENOUS at 07:35

## 2017-08-30 RX ADMIN — PROPOFOL 140 MCG/KG/MIN: 10 INJECTION, EMULSION INTRAVENOUS at 07:34

## 2017-08-30 RX ADMIN — PROPOFOL 150 MG: 10 INJECTION, EMULSION INTRAVENOUS at 07:34

## 2017-08-30 RX ADMIN — LIDOCAINE HYDROCHLORIDE 50 MG: 20 INJECTION, SOLUTION INFILTRATION; PERINEURAL at 07:34

## 2017-08-30 RX ADMIN — PROPOFOL 50 MG: 10 INJECTION, EMULSION INTRAVENOUS at 07:40

## 2017-08-30 RX ADMIN — SODIUM CHLORIDE, POTASSIUM CHLORIDE, SODIUM LACTATE AND CALCIUM CHLORIDE 30 ML/HR: 600; 310; 30; 20 INJECTION, SOLUTION INTRAVENOUS at 06:53

## 2017-08-30 NOTE — ANESTHESIA POSTPROCEDURE EVALUATION
Patient: Eve Hurley    Procedure Summary     Date Anesthesia Start Anesthesia Stop Room / Location    08/30/17 0732 0751  [facility]       Procedure Diagnosis Surgeon Provider    COLONOSCOPY INTO CECUM (N/A ) Abnormal CT of the abdomen  (Abnormal CT of the abdomen [R93.5]) MD Jalen Sun MD          Anesthesia Type: MAC  Last vitals  BP   117/67 (08/30/17 0815)    Temp   36.8 °C (98.2 °F) (08/30/17 0755)    Pulse   64 (08/30/17 0815)   Resp   18 (08/30/17 0815)    SpO2   99 % (08/30/17 0815)      Post Anesthesia Care and Evaluation    Patient location during evaluation: PACU  Patient participation: complete - patient participated  Level of consciousness: awake and alert  Pain score: 0  Pain management: adequate  Airway patency: patent  Anesthetic complications: No anesthetic complications  PONV Status: none  Cardiovascular status: acceptable  Respiratory status: acceptable  Hydration status: acceptable

## 2017-08-30 NOTE — ANESTHESIA PREPROCEDURE EVALUATION
Anesthesia Evaluation     Patient summary reviewed          Airway   Mallampati: III  TM distance: >3 FB  Neck ROM: full  no difficulty expected  Dental - normal exam     Pulmonary     breath sounds clear to auscultation  Cardiovascular   Exercise tolerance: good (4-7 METS)    Rhythm: regular  Rate: normal        Neuro/Psych  GI/Hepatic/Renal/Endo      Musculoskeletal     Abdominal    Substance History      OB/GYN          Other                                        Anesthesia Plan    ASA 2     MAC     intravenous induction   Anesthetic plan and risks discussed with patient.

## 2017-09-10 ENCOUNTER — APPOINTMENT (OUTPATIENT)
Dept: GENERAL RADIOLOGY | Facility: HOSPITAL | Age: 67
End: 2017-09-10

## 2017-09-10 ENCOUNTER — HOSPITAL ENCOUNTER (EMERGENCY)
Facility: HOSPITAL | Age: 67
Discharge: HOME | End: 2017-09-11
Attending: EMERGENCY MEDICINE | Admitting: EMERGENCY MEDICINE

## 2017-09-10 VITALS
HEART RATE: 85 BPM | RESPIRATION RATE: 16 BRPM | SYSTOLIC BLOOD PRESSURE: 163 MMHG | TEMPERATURE: 98.1 F | BODY MASS INDEX: 21.26 KG/M2 | OXYGEN SATURATION: 98 % | WEIGHT: 120 LBS | HEIGHT: 63 IN | DIASTOLIC BLOOD PRESSURE: 90 MMHG

## 2017-09-10 DIAGNOSIS — S70.01XA CONTUSION OF RIGHT HIP, INITIAL ENCOUNTER: Primary | ICD-10-CM

## 2017-09-10 PROCEDURE — 73502 X-RAY EXAM HIP UNI 2-3 VIEWS: CPT

## 2017-09-10 PROCEDURE — 99283 EMERGENCY DEPT VISIT LOW MDM: CPT

## 2017-09-10 NOTE — ED PROVIDER NOTES
EMERGENCY DEPARTMENT ENCOUNTER    CHIEF COMPLAINT  Chief Complaint: fall, hip pain  History given by: patient  History limited by: nothing  Room Number:   PMD: Gladys Gutierrez MD      HPI:  Pt is a 67 y.o. female who presents s/p fall from a 2 foot ladder complaining of R posterior hip and thigh pain. Pt states she landed on the counter, striking her R hip. Pt states her pain is moderate at this time and denies any other injuries or LOC. Pt has no other complaints at this time.     Duration/Onset/Timing: constant x2 hours since fall  Location: R hip  Radiation: none  Quality: aching  Intensity/Severity: moderate  Associated Symptoms: none  Aggravating or Alleviating Factors: movement  Previous Episodes: none      PAST MEDICAL HISTORY  Active Ambulatory Problems     Diagnosis Date Noted   • Hypothyroidism 2016   • Arthralgia of hip 2016   • Osteoporosis 2016   • Hearing difficulty 2016   • Rectal bleeding 03/15/2016   • Diverticulitis of intestine without perforation or abscess without bleeding 2017   • Senile osteoporosis 2017   • Abnormal CT of the abdomen 2017     Resolved Ambulatory Problems     Diagnosis Date Noted   • No Resolved Ambulatory Problems     Past Medical History:   Diagnosis Date   • Actinic keratoses    • Anemia    • Atypical chest pain 2009   • DDD (degenerative disc disease), cervical    • Disease of thyroid gland    • Disorder of cecum 2017   • Diverticulitis    • Fibrocystic breast    • Hearing difficulty    • Hip joint pain    • Hypertension    • Left lower quadrant pain 2004   • Lower extremity pain, left 06/15/2012   • Osteoporosis    • Perforated ear drum 2014   • PONV (postoperative nausea and vomiting)    • Rectal bleed 03/15/2016   • Renal insufficiency 2016   • Vegetarian        PAST SURGICAL HISTORY  Past Surgical History:   Procedure Laterality Date   • BREAST SURGERY Right     CYST ASPIRATION   •   SECTION N/A 1981   • COLONOSCOPY N/A 09/01/2004    DIVERTICULOSIS, REDUNDANT SIGMOID, DR. KELLEN ROBERTS AT [place]   • COLONOSCOPY N/A 8/30/2017    Procedure: COLONOSCOPY INTO CECUM;  Surgeon: Marito Dunbar MD;  Location: [facility] ENDOSCOPY;  Service:    • HEMORRHOIDECTOMY N/A 11/13/2009    DR. KELLEN ROBERTS AT [place] [facility]   • TUBAL ABDOMINAL LIGATION Bilateral 1986       FAMILY HISTORY  Family History   Problem Relation Age of Onset   • Coronary artery disease Father    • Heart disease Father    • Hypertension Father    • Arthritis Father    • Alcohol abuse Father    • Hyperthyroidism Brother    • Thyroid disease Brother    • Diabetes Daughter    • Coronary artery disease Maternal Grandmother    • Arthritis Maternal Grandmother    • Asthma Maternal Grandmother    • ALS Mother    • Depression Mother    • Early death Mother    • Thyroid disease Sister    • Heart disease Maternal Grandfather    • Alcohol abuse Maternal Grandfather    • Arthritis Paternal Grandmother    • Diabetes Paternal Grandmother    • Alcohol abuse Paternal Grandfather    • Macular degeneration Paternal Grandfather    • Heart attack Paternal Grandfather    • Alcohol abuse Sister    • Depression Sister        SOCIAL HISTORY  Social History     Social History   • Marital status:      Spouse name: N/A   • Number of children: N/A   • Years of education: N/A     Occupational History   • Not on file.     Social History Main Topics   • Smoking status: Former Smoker     Packs/day: 1.00     Years: 15.00     Types: Cigarettes   • Smokeless tobacco: Never Used   • Alcohol use 1.2 oz/week     2 Glasses of wine per week   • Drug use: No   • Sexual activity: Not Currently     Other Topics Concern   • Not on file     Social History Narrative       ALLERGIES  Sulfa antibiotics and Penicillins    REVIEW OF SYSTEMS  Review of Systems   Constitutional: Negative for fever.   Respiratory: Negative for shortness of breath.    Cardiovascular: Negative for chest pain.    Neurological: Negative for headaches.       PHYSICAL EXAM  ED Triage Vitals   Temp Heart Rate Resp BP SpO2   09/10/17 1511 09/10/17 1511 09/10/17 1511 09/10/17 1511 09/10/17 1511   98.2 °F (36.8 °C) 99 15 118/77 99 %      Temp src Heart Rate Source Patient Position BP Location FiO2 (%)   09/10/17 1511 -- -- -- --   Tympanic           Physical Exam   Constitutional: No distress.   HENT:   Head: Normocephalic and atraumatic.   Cardiovascular: Regular rhythm.    Pulmonary/Chest: No respiratory distress.   Abdominal: There is no tenderness.   Musculoskeletal: She exhibits no tenderness.   No L spine tenderness, tenderness to palpation along R buttocks and upper hip   Skin: No rash noted.   Nursing note and vitals reviewed.    RADIOLOGY  XR Hip With or Without Pelvis 2 - 3 View Right   Preliminary Result   Negative.          I ordered the above noted radiological studies. Interpreted by radiologist. Reviewed by me in PACS.       PROCEDURES  Procedures      PROGRESS AND CONSULTS  ED Course     4:29 PM  Ordered XR R hip for further evaluation.     5:31 PM  Pt rechecked and is resting comfortably. BP- 118/77 HR- 99 Temp- 98.2 °F (36.8 °C) (Tympanic) O2 sat- 99%. All pertinent lab/imaging/EKG findings discussed including no fracture seen on XR. Pt/family verbalized understanding and agree with plan to discharge. Advised Pt to ice affected area as needed and take tylenol or ibuprofen as needed for pain. All questions and concerns addressed at this time.         MEDICAL DECISION MAKING  Results were reviewed/discussed with the patient and they were also made aware of online access. Pt also made aware that some labs, such as cultures, will not be resulted during ER visit and follow up with PMD is necessary.     MDM  Number of Diagnoses or Management Options  Contusion of right hip, initial encounter:      Amount and/or Complexity of Data Reviewed  Tests in the radiology section of CPT®: ordered and reviewed (XR R hip:  Negative)  Independent visualization of images, tracings, or specimens: yes    Patient Progress  Patient progress: stable         DIAGNOSIS  Final diagnoses:   Contusion of right hip, initial encounter       DISPOSITION  Disposition: Discharged.    Patient discharged in stable condition.    Reviewed implications of results, diagnosis, meds, responsibility to follow up, warning signs and symptoms of possible worsening, potential complications and reasons to return to ER.    Patient/Family voiced understanding of above instructions.    Discussed plan for discharge, as there is no emergent indication for admission.  Pt/family is agreeable and understands need for follow up and repeat testing.  Pt is aware that discharge does not mean that nothing is wrong but it indicates no emergency is present and they must continue care with follow-up as given below or physician of their choice.     FOLLOW-UP  Gladys Gutierrez MD  [address] [id number] [phone]    Go to  As needed         Medication List      Notice     No changes were made to your prescriptions during this visit.          Latest Documented Vital Signs:  As of 5:35 PM  BP- 118/77 HR- 99 Temp- 98.2 °F (36.8 °C) (Tympanic) O2 sat- 99%    --  Documentation assistance provided by scrivan Walters for Dr. Sanchez.  Information recorded by the scribe was done at my direction and has been verified and validated by me.            Rochelle Walters  09/10/17 1730       Aaron Sanchez MD  09/10/17 1734

## 2017-09-10 NOTE — ED TRIAGE NOTES
Patient was using a step ladder and fell approximately two feet.  PPP bilaterally.  Patient struck right thigh.

## 2017-09-18 ENCOUNTER — OFFICE VISIT (OUTPATIENT)
Dept: INTERNAL MEDICINE | Facility: CLINIC | Age: 67
End: 2017-09-18

## 2017-09-18 VITALS
DIASTOLIC BLOOD PRESSURE: 66 MMHG | HEART RATE: 71 BPM | BODY MASS INDEX: 19.13 KG/M2 | SYSTOLIC BLOOD PRESSURE: 116 MMHG | OXYGEN SATURATION: 95 % | WEIGHT: 108 LBS

## 2017-09-18 DIAGNOSIS — S39.92XD BUTTOCK TRAUMA, SUBSEQUENT ENCOUNTER: ICD-10-CM

## 2017-09-18 DIAGNOSIS — T14.8XXA HEMATOMA: Primary | ICD-10-CM

## 2017-09-18 PROBLEM — S39.92XA: Status: ACTIVE | Noted: 2017-09-18

## 2017-09-18 PROCEDURE — 90471 IMMUNIZATION ADMIN: CPT | Performed by: INTERNAL MEDICINE

## 2017-09-18 PROCEDURE — 99213 OFFICE O/P EST LOW 20 MIN: CPT | Performed by: INTERNAL MEDICINE

## 2017-09-18 PROCEDURE — 90662 IIV NO PRSV INCREASED AG IM: CPT | Performed by: INTERNAL MEDICINE

## 2017-09-18 NOTE — PROGRESS NOTES
Chief Complaint   Patient presents with   • Fall     1 week ago   right hip pain fall x 1 week ago.     History of Present Illness   Eve Hurley is a 67 y.o. female presents for   The following portions of the patient's history were reviewed and updated as appropriate: allergies, current medications, past family history, past medical history, past social history, past surgical history and problem list.  Current Outpatient Prescriptions on File Prior to Visit   Medication Sig Dispense Refill   • acetaminophen (TYLENOL) 500 MG tablet Take 1,000 mg by mouth every day.     • calcium carbonate EX (TUMS EX) 750 MG chewable tablet Chew 750 mg 4 (four) times a day.     • Calcium Carbonate-Vit D-Min (CALTRATE 600+D PLUS MINERALS PO) Take 1,600 Units by mouth daily.     • Cholecalciferol (VITAMIN D3) 2000 UNITS capsule Take  by mouth.     • denosumab (PROLIA) 60 MG/ML solution syringe Inject 60 mg under the skin every 6 (six) months.     • Flaxseed, Linseed, (FLAXSEED OIL) 1000 MG capsule Take  by mouth.     • levothyroxine (SYNTHROID, LEVOTHROID) 75 MCG tablet Take  by mouth.     • Multiple Vitamin (MULTIVITAMINS PO) Take  by mouth.     • polycarbophil (FIBERCON) 625 MG tablet Take 625 mg by mouth daily.     • Probiotic Product (PROBIOTIC DAILY PO)      • vitamin B-12 (CYANOCOBALAMIN) 1000 MCG tablet Take  by mouth.       No current facility-administered medications on file prior to visit.      Review of Systems   Constitutional: Negative.    HENT: Negative.    Eyes: Negative.    Respiratory: Negative.    Cardiovascular: Negative.    Gastrointestinal: Negative.    Endocrine: Negative.    Genitourinary: Negative.    Musculoskeletal:        Right buttock pain  Worse with sitting   Skin: Negative.    Allergic/Immunologic: Negative.    Neurological: Negative.    Hematological: Bruises/bleeds easily.   Psychiatric/Behavioral: Negative.        Objective   Physical Exam   Constitutional: She is oriented to person, place, and  time. She appears well-developed and well-nourished.   HENT:   Head: Normocephalic and atraumatic.   Right Ear: External ear normal.   Left Ear: External ear normal.   Nose: Nose normal.   Mouth/Throat: Oropharynx is clear and moist.   Eyes: EOM are normal. Pupils are equal, round, and reactive to light.   Neck: Neck supple.   Cardiovascular: Normal rate, regular rhythm and normal heart sounds.    Pulmonary/Chest: Effort normal and breath sounds normal. No respiratory distress.   Abdominal: Soft.   Musculoskeletal: Normal range of motion.   Full rom B hips   Neurological: She is alert and oriented to person, place, and time.   Skin: Skin is warm and dry.   bruisin B buttock. Hematoma right buttock. Healing abrasion.    Psychiatric: She has a normal mood and affect. Her behavior is normal. Judgment and thought content normal.   Nursing note and vitals reviewed.       /66  Pulse 71  Wt 108 lb (49 kg)  LMP  (LMP Unknown)  SpO2 95%  BMI 19.13 kg/m2    Assessment/Plan   Diagnoses and all orders for this visit:    Hematoma    Buttock trauma, subsequent encounter      Patient w/ right hip pain and bruising. Xray from er reviewed with no fracture. She has a hematoma and care instructions are given. She will monitor this and call if pain intensifies. Activity as tolerated. F/u prn.

## 2017-09-18 NOTE — PATIENT INSTRUCTIONS
Hematoma  A hematoma is a collection of blood under the skin, in an organ, in a body space, in a joint space, or in other tissue. The blood can clot to form a lump that you can see and feel. The lump is often firm and may sometimes become sore and tender. Most hematomas get better in a few days to weeks. However, some hematomas may be serious and require medical care. Hematomas can range in size from very small to very large.  CAUSES   A hematoma can be caused by a blunt or penetrating injury. It can also be caused by spontaneous leakage from a blood vessel under the skin. Spontaneous leakage from a blood vessel is more likely to occur in older people, especially those taking blood thinners. Sometimes, a hematoma can develop after certain medical procedures.  SIGNS AND SYMPTOMS   · A firm lump on the body.  · Possible pain and tenderness in the area.  · Bruising. Blue, dark blue, purple-red, or yellowish skin may appear at the site of the hematoma if the hematoma is close to the surface of the skin.  For hematomas in deeper tissues or body spaces, the signs and symptoms may be subtle. For example, an intra-abdominal hematoma may cause abdominal pain, weakness, fainting, and shortness of breath. An intracranial hematoma may cause a headache or symptoms such as weakness, trouble speaking, or a change in consciousness.  DIAGNOSIS   A hematoma can usually be diagnosed based on your medical history and a physical exam. Imaging tests may be needed if your health care provider suspects a hematoma in deeper tissues or body spaces, such as the abdomen, head, or chest. These tests may include ultrasonography or a CT scan.   TREATMENT   Hematomas usually go away on their own over time. Rarely does the blood need to be drained out of the body. Large hematomas or those that may affect vital organs will sometimes need surgical drainage or monitoring.  HOME CARE INSTRUCTIONS   · Apply ice to the injured area:      Put ice in a  plastic bag.      Place a towel between your skin and the bag.      Leave the ice on for 20 minutes, 2-3 times a day for the first 1 to 2 days.    · After the first 2 days, switch to using warm compresses on the hematoma.    · Elevate the injured area to help decrease pain and swelling. Wrapping the area with an elastic bandage may also be helpful. Compression helps to reduce swelling and promotes shrinking of the hematoma. Make sure the bandage is not wrapped too tight.    · If your hematoma is on a lower extremity and is painful, crutches may be helpful for a couple days.    · Only take over-the-counter or prescription medicines as directed by your health care provider.  SEEK IMMEDIATE MEDICAL CARE IF:   · You have increasing pain, or your pain is not controlled with medicine.    · You have a fever.    · You have worsening swelling or discoloration.    · Your skin over the hematoma breaks or starts bleeding.    · Your hematoma is in your chest or abdomen and you have weakness, shortness of breath, or a change in consciousness.  · Your hematoma is on your scalp (caused by a fall or injury) and you have a worsening headache or a change in alertness or consciousness.  MAKE SURE YOU:   · Understand these instructions.  · Will watch your condition.  · Will get help right away if you are not doing well or get worse.     This information is not intended to replace advice given to you by your health care provider. Make sure you discuss any questions you have with your health care provider.     Document Released: 08/01/2005 Document Revised: 08/20/2014 Document Reviewed: 05/28/2014  [employer] Patient Education ©2017 [employer]

## 2017-10-26 DIAGNOSIS — M81.0 OSTEOPOROSIS, UNSPECIFIED OSTEOPOROSIS TYPE, UNSPECIFIED PATHOLOGICAL FRACTURE PRESENCE: Primary | ICD-10-CM

## 2017-10-30 ENCOUNTER — HOSPITAL ENCOUNTER (OUTPATIENT)
Dept: INFUSION THERAPY | Facility: HOSPITAL | Age: 67
Discharge: HOME | End: 2017-10-30
Admitting: INTERNAL MEDICINE

## 2017-10-30 VITALS
TEMPERATURE: 97.2 F | RESPIRATION RATE: 16 BRPM | SYSTOLIC BLOOD PRESSURE: 119 MMHG | DIASTOLIC BLOOD PRESSURE: 64 MMHG | HEART RATE: 77 BPM | OXYGEN SATURATION: 100 %

## 2017-10-30 DIAGNOSIS — M81.0 SENILE OSTEOPOROSIS: ICD-10-CM

## 2017-10-30 LAB
ANION GAP SERPL CALCULATED.3IONS-SCNC: 11.3 MMOL/L
BUN BLD-MCNC: 12 MG/DL (ref 8–23)
BUN/CREAT SERPL: 13.6 (ref 7–25)
CALCIUM SPEC-SCNC: 9.1 MG/DL (ref 8.6–10.5)
CHLORIDE SERPL-SCNC: 104 MMOL/L (ref 98–107)
CO2 SERPL-SCNC: 27.7 MMOL/L (ref 22–29)
CREAT BLD-MCNC: 0.88 MG/DL (ref 0.57–1)
GFR SERPL CREATININE-BSD FRML MDRD: 64 ML/MIN/1.73
GLUCOSE BLD-MCNC: 118 MG/DL (ref 65–99)
POTASSIUM BLD-SCNC: 3.8 MMOL/L (ref 3.5–5.2)
SODIUM BLD-SCNC: 143 MMOL/L (ref 136–145)

## 2017-10-30 PROCEDURE — 96372 THER/PROPH/DIAG INJ SC/IM: CPT

## 2017-10-30 PROCEDURE — 36415 COLL VENOUS BLD VENIPUNCTURE: CPT

## 2017-10-30 PROCEDURE — 25010000002 DENOSUMAB 60 MG/ML SOLUTION: Performed by: INTERNAL MEDICINE

## 2017-10-30 PROCEDURE — 80048 BASIC METABOLIC PNL TOTAL CA: CPT | Performed by: INTERNAL MEDICINE

## 2017-10-30 RX ADMIN — DENOSUMAB 60 MG: 60 INJECTION SUBCUTANEOUS at 13:51

## 2017-12-12 ENCOUNTER — OFFICE VISIT (OUTPATIENT)
Dept: INTERNAL MEDICINE | Facility: CLINIC | Age: 67
End: 2017-12-12

## 2017-12-12 VITALS
BODY MASS INDEX: 19.13 KG/M2 | WEIGHT: 108 LBS | HEART RATE: 73 BPM | DIASTOLIC BLOOD PRESSURE: 60 MMHG | SYSTOLIC BLOOD PRESSURE: 110 MMHG | OXYGEN SATURATION: 99 %

## 2017-12-12 DIAGNOSIS — M54.42 ACUTE LEFT-SIDED LOW BACK PAIN WITH LEFT-SIDED SCIATICA: Primary | ICD-10-CM

## 2017-12-12 PROCEDURE — 72110 X-RAY EXAM L-2 SPINE 4/>VWS: CPT | Performed by: INTERNAL MEDICINE

## 2017-12-12 PROCEDURE — 99214 OFFICE O/P EST MOD 30 MIN: CPT | Performed by: INTERNAL MEDICINE

## 2017-12-12 RX ORDER — METHYLPREDNISOLONE 4 MG/1
TABLET ORAL
Qty: 21 EACH | Refills: 0 | Status: SHIPPED | OUTPATIENT
Start: 2017-12-12 | End: 2017-12-12 | Stop reason: SDUPTHER

## 2017-12-12 RX ORDER — METHYLPREDNISOLONE 4 MG/1
TABLET ORAL
Qty: 21 EACH | Refills: 0 | Status: SHIPPED | OUTPATIENT
Start: 2017-12-12 | End: 2018-01-26

## 2017-12-12 RX ORDER — GABAPENTIN 100 MG/1
100 CAPSULE ORAL 3 TIMES DAILY
Qty: 90 CAPSULE | Refills: 3 | Status: SHIPPED | OUTPATIENT
Start: 2017-12-12 | End: 2018-05-15 | Stop reason: DRUGHIGH

## 2017-12-12 NOTE — PROGRESS NOTES
"Chief Complaint   Patient presents with   • Leg Pain     Left hip leg and foot pain for about 2 months       History of Present Illness   Eve Hurley is a 67 y.o. female presents for acute care. She is doing fairly well today. She did have a fall in September from a ladder. She had gradual healing then started to develop pain starting at her left hip. This wraps around the side down to her foot. \"this almost feels neurological\". She can feel some pain when coughs or has a bm. Hip imaging negative at time of injury but no other imaging obtained. She has been taking tylenol for pain w/ modest benefit.         The following portions of the patient's history were reviewed and updated as appropriate: allergies, current medications, past family history, past medical history, past social history, past surgical history and problem list.  Current Outpatient Prescriptions on File Prior to Visit   Medication Sig Dispense Refill   • acetaminophen (TYLENOL) 500 MG tablet Take 1,000 mg by mouth every day.     • calcium carbonate EX (TUMS EX) 750 MG chewable tablet Chew 750 mg 4 (four) times a day.     • Calcium Carbonate-Vit D-Min (CALTRATE 600+D PLUS MINERALS PO) Take 1,600 Units by mouth daily.     • Cholecalciferol (VITAMIN D3) 2000 UNITS capsule Take  by mouth.     • denosumab (PROLIA) 60 MG/ML solution syringe Inject 60 mg under the skin every 6 (six) months.     • Flaxseed, Linseed, (FLAXSEED OIL) 1000 MG capsule Take  by mouth.     • levothyroxine (SYNTHROID, LEVOTHROID) 75 MCG tablet Take  by mouth.     • Multiple Vitamin (MULTIVITAMINS PO) Take  by mouth.     • polycarbophil (FIBERCON) 625 MG tablet Take 625 mg by mouth daily.     • Probiotic Product (PROBIOTIC DAILY PO)      • vitamin B-12 (CYANOCOBALAMIN) 1000 MCG tablet Take  by mouth.       No current facility-administered medications on file prior to visit.      Review of Systems   Constitutional: Negative.    HENT: Negative.    Respiratory: Negative.  "   Cardiovascular: Negative.    Gastrointestinal: Negative.    Endocrine: Negative.    Genitourinary: Negative.    Musculoskeletal: Positive for back pain.   Skin: Negative.    Allergic/Immunologic: Negative.    Neurological:        Left lower extremity radicular pain   Hematological: Negative.    Psychiatric/Behavioral: Negative.        Objective   Physical Exam   Constitutional: She is oriented to person, place, and time. She appears well-developed and well-nourished.   HENT:   Head: Normocephalic and atraumatic.   Right Ear: External ear normal.   Left Ear: External ear normal.   Nose: Nose normal.   Mouth/Throat: Oropharynx is clear and moist.   Eyes: EOM are normal. Pupils are equal, round, and reactive to light.   Neck: Neck supple.   Cardiovascular: Normal rate, regular rhythm and normal heart sounds.    Pulmonary/Chest: Effort normal and breath sounds normal. No respiratory distress.   Abdominal: Soft.   Musculoskeletal: Normal range of motion.   Pain w/ back forward flexion/ extension.   Normal hip rom     Neurological: She is alert and oriented to person, place, and time.   Skin: Skin is warm and dry.   Psychiatric: She has a normal mood and affect. Her behavior is normal. Judgment and thought content normal.   Nursing note and vitals reviewed.     L?S spine for back pain and trauma. No acute findings. Minor ddd. Increased stool. No prior.     /60  Pulse 73  Wt 49 kg (108 lb)  LMP  (LMP Unknown)  SpO2 99%  BMI 19.13 kg/m2    Assessment/Plan   Diagnoses and all orders for this visit:    Acute left-sided low back pain with left-sided sciatica  -     Ambulatory Referral to Physical Therapy Evaluate and treat    Other orders  -     MethylPREDNISolone (MEDROL, ELAINE,) 4 MG tablet; Take as directed on package instructions.  -     gabapentin (NEURONTIN) 100 MG capsule; Take 1 capsule by mouth 3 (Three) Times a Day.      Patient w/ subacute low back pain w/ sciatic radiation after trauma. She will take a  course of a medrol dose pack. Then try neurontin w/ gradual dosing increase. To physical therapy. Follow up 6 weeks or prn.

## 2017-12-22 ENCOUNTER — HOSPITAL ENCOUNTER (OUTPATIENT)
Dept: PHYSICAL THERAPY | Facility: HOSPITAL | Age: 67
Setting detail: THERAPIES SERIES
Discharge: HOME | End: 2017-12-22

## 2017-12-22 DIAGNOSIS — M54.5 LEFT LOW BACK PAIN, UNSPECIFIED CHRONICITY, WITH SCIATICA PRESENCE UNSPECIFIED: Primary | ICD-10-CM

## 2017-12-22 DIAGNOSIS — M79.10 TRIGGER POINT: ICD-10-CM

## 2017-12-22 PROCEDURE — 97140 MANUAL THERAPY 1/> REGIONS: CPT | Performed by: PHYSICAL THERAPIST

## 2017-12-22 PROCEDURE — 97161 PT EVAL LOW COMPLEX 20 MIN: CPT | Performed by: PHYSICAL THERAPIST

## 2017-12-22 PROCEDURE — G8982 BODY POS GOAL STATUS: HCPCS | Performed by: PHYSICAL THERAPIST

## 2017-12-22 PROCEDURE — G8981 BODY POS CURRENT STATUS: HCPCS | Performed by: PHYSICAL THERAPIST

## 2017-12-22 NOTE — THERAPY EVALUATION
Outpatient Physical Therapy Ortho Initial Evaluation  [facility]     Patient Name: Eve Hurley  : 1950  MRN: [id number]  Today's Date: 2017      Visit Date: 2017    Patient Active Problem List   Diagnosis   • Hypothyroidism   • Arthralgia of hip   • Osteoporosis   • Hearing difficulty   • Rectal bleeding   • Diverticulitis of intestine without perforation or abscess without bleeding   • Senile osteoporosis   • Abnormal CT of the abdomen   • Buttock trauma   • Hematoma        Past Medical History:   Diagnosis Date   • Actinic keratoses    • Anemia    • Atypical chest pain 2009    SEEN AT [facility] ER   • DDD (degenerative disc disease), cervical    • Disease of thyroid gland     HYPOTHYROIDISM   • Disorder of cecum 2017    CT OF ABD AND PELVIS AT [facility] SHOWED THICKENING IN CECUM, POSSIBLE MASS   • Diverticulitis     MOST RECENTLY ON 17, HAS HAD 4-5 EPISODES IN PAST 20 YEARS.    • Fall    • Fibrocystic breast    • Hearing difficulty    • Hip joint pain    • Hypertension    • Left lower quadrant pain 2004    CT SCAN AT [facility] SHOWED ESSENTIALLY NORMAL EXCEPT 2 VERY SMALL HEPATIC CYSTS AND CYSTIC STRUCTURE ON THE LEFT SIDE OF OF THE PELVIS REPRESENTING A 2CM OVARIAN CYST   • Lower extremity pain, left 06/15/2012    VENOUS DOPLER AT [facility] WAS WNL   • Osteoporosis     GETS PROLIA INJECTIONS   • Perforated ear drum 2014    MICROPERFORATION, LEFT EAR, SAW DR. GREGORY LAGOS   • PONV (postoperative nausea and vomiting)    • Rectal bleed 03/15/2016   • Renal insufficiency 2016   • Vegetarian     VEGAN        Past Surgical History:   Procedure Laterality Date   • BREAST SURGERY Right     CYST ASPIRATION   •  SECTION N/A    • COLONOSCOPY N/A 2004    DIVERTICULOSIS, REDUNDANT SIGMOID, DR. KELLEN ROBERTS AT [place]   • COLONOSCOPY N/A 2017    Procedure: COLONOSCOPY INTO CECUM;  Surgeon: Marito Dunbar MD;  Location: [facility] ENDOSCOPY;  Service:    •  HEMORRHOIDECTOMY N/A 11/13/2009    DR. KELLEN ROBERTS AT [facility]   • TUBAL ABDOMINAL LIGATION Bilateral 1986       Visit Dx:     ICD-10-CM ICD-9-CM   1. Left low back pain, unspecified chronicity, with sciatica presence unspecified M54.5 724.2   2. Trigger point M79.1 729.1             Patient History       12/22/17 1200          History    Chief Complaint Difficulty with daily activities;Pain  -GJ      Type of Pain Back pain;Hip pain;Lower Extremity / Leg  -GJ      Date Current Problem(s) Began --   9/2017  -GJ      Brief Description of Current Complaint Ms. Hurley is a 68 y/o female.  She reports falling from a ladder in 9/2017.  Once her bruising subsided, about 2-3 weeks after her fall she noted L lateral hip, to knee to bottom of her foot pain which has not gotten better, even after completing a steroid dose pack 2 days ago.  Aggravating activities include donning socks/shoes, bending forward.  Walking is not bothersome.  Sleeping is uncomfortable.  She is active in Yoga.  No relieving positions.  Pain described as deep/dull ache, occasional numbness of her L foot.  She does have previous history of R HS pull/tear, she reports previous history of R hip bursitis.    -GJ      Previous treatment for THIS PROBLEM Medication   steroid  -GJ      Onset Date- PT 12/222/17  -GJ      Patient/Caregiver Goals Relieve pain;Return to prior level of function;Improve mobility;Know what to do to help the symptoms  -GJ      Occupation/sports/leisure activities walking yoga reading mediation puzzles  -GJ      What clinical tests have you had for this problem? X-ray  -GJ      Are you or can you be pregnant No  -GJ      Pain     Pain Location Back;Hip;Leg  -GJ      Pain at Present 4  -GJ      Pain at Best 4  -GJ      Pain at Worst 5  -GJ      Pain Description Aching;Dull  -GJ      What Performance Factors Make the Current Problem(s) WORSE? donning/doffing socks/shoes, bending   -GJ      What Performance Factors Make the Current  Problem(s) BETTER? nothing  -GJ      Fall Risk Assessment    Any falls in the past year: Yes  -GJ      Number of falls reported in the last 12 months 1  -GJ      Factors that contributed to the fall: --   fell from ladder  -GJ      Daily Activities    Primary Language English  -GJ      Are you able to read Yes  -GJ      Are you able to write Yes  -GJ      How does patient learn best? Reading  -GJ      Teaching needs identified Management of Condition;Home Exercise Program  -GJ      Barriers to learning None  -GJ      Pt Participated in POC and Goals Yes  -GJ      Safety    Are you being hurt, hit, or frightened by anyone at home or in your life? No  -GJ      Are you being neglected by a caregiver No  -GJ        User Key  (r) = Recorded By, (t) = Taken By, (c) = Cosigned By    Initials Name Provider Type    LUIS ALFREDO Purcell, PT Physical Therapist                PT Ortho       12/22/17 1300    Posture/Observations    Posture- WNL Posture is WNL  -GJ    Quarter Clearing    Quarter Clearing Lower Quarter Clearing  -GJ    DTR- Lower Quarter Clearing    Patellar tendon (L2-4) Bilateral:;2- Normal response  -GJ    Achilles tendon (S1-2) Bilateral:;2- Normal response  -GJ    Neural Tension Signs- Lower Quarter Clearing    Slump Bilateral:;Negative  -GJ    SLR Bilateral:;Negative  -GJ    Prone knee flexion Bilateral:;Negative  -GJ    Myotomal Screen- Lower Quarter Clearing    Hip flexion (L2) Bilateral:;4+ (Good +)  -GJ    Knee extension (L3) Bilateral:;5 (Normal)  -GJ    Ankle DF (L4) Bilateral:;5 (Normal)  -GJ    Great toe extension (L5) Bilateral:;5 (Normal)  -GJ    Ankle PF (S1) Bilateral:;4 (Good)  -GJ    Knee flexion (S2) Bilateral:;5 (Normal)  -GJ    Lumbar ROM Screen- Lower Quarter Clearing    Lumbar Flexion Impaired  -GJ    Lumbar Extension Normal  -GJ    Lumbar Lateral Flexion Normal  -GJ    Lumbar Rotation Normal  -GJ    SI/Hip Screen- Lower Quarter Clearing    Jung's/Ayan's test Bilateral:;Negative  -GJ     Posterior thigh sheer Bilateral:;Negative  -GJ    Special Tests/Palpation    Special Tests/Palpation Lumbar/SI;Hip  -GJ    Lumbar/SI Special Tests    S[place] Test (SI Dysfunction) Negative  -GJ    Hip/Thigh Palpation    Gluteus Medius Left:;Tender;Guarded/taut;Trigger point  -GJ    Gluteus Minimus Left:;Tender;Guarded/taut;Trigger point  -GJ    Piriformis Left:;Tender;Guarded/taut;Trigger point  -GJ    SI --   no pain  -GJ    Hip/Thigh Palpation? Yes  -GJ    Hip Special Tests    Trendelenberg sign (gluteus medius weakness) Bilateral:;Negative  -GJ    Ely’s test (rectus femoris tightness) Bilateral:;Negative  -GJ    Hip scour test (labral vs hip pathology) Bilateral:;Negative  -GJ    MMT (Manual Muscle Testing)    General MMT Assessment lower extremity strength deficits identified  -GJ    Left Hip    Hip Extension Gross Movement (4+/5) good plus  -GJ    Hip ABduction Gross Movement (4+/5) good plus  -GJ    Right Hip    Hip Extension Gross Movement (4+/5) good plus  -GJ    Hip ABduction Gross Movement (4+/5) good plus  -GJ    Lower Extremity    Lower Ext Manual Muscle Testing left hip strength deficit;right hip strength deficit  -GJ    Flexibility    Flexibility Tested? Lower Extremity  -GJ    Lower Extremity Flexibility    Hamstrings Right:;Mildly limited;Left:;Moderately limited  -GJ    Hip Flexors Bilateral:;Mildly limited  -GJ    Quadriceps Bilateral:;Mildly limited  -GJ    Hip External Rotators Bilateral:;Moderately limited  -GJ    Hip Internal Rotators Bilateral:;Moderately limited  -GJ    Balance Skills Training    SLS 10+ seconds  -GJ      User Key  (r) = Recorded By, (t) = Taken By, (c) = Cosigned By    Initials Name Provider Type    LUIS ALFREDO Purcell, PT Physical Therapist                      Therapy Education  Education Details: discussed dx, px, poc, discussed anatomy of the hip/spine and physiology of healing.  Discussed risks/benefits of DDN, discussed importance of strengthening hip girdle  tissues to prevent hip bursitis.     Given: Symptoms/condition management, Pain management, Posture/body mechanics, Mobility training  How Provided: Verbal  Provided to: Patient  Level of Understanding: Verbalized           PT OP Goals       12/22/17 1200       PT Short Term Goals    STG Date to Achieve 01/19/18  -GJ     STG 1 pt. to be I with initial HEP to facilitate self management of their condition  -GJ     STG 1 Progress New  -GJ     STG 2 pt. to be educated in/verbalize understanding of the importance of posture/ergonomics in association with their condition to facilitate self management of their condition  -GJ     STG 2 Progress New  -GJ     STG 3 pt to demonstrate full L knee ext in sitting to facilitate ease of performing household activities  -GJ     STG 3 Progress New  -GJ     Long Term Goals    LTG Date to Achieve 02/23/18  -GJ     LTG 1 pt. to be I with advanced HEP to facilitate self management of their condition  -GJ     LTG 1 Progress New  -GJ     LTG 2 pt to demonstrate/report donning/doffing shoes/socks on L without exacerbation of L hip pain to facilitate ease with self care activities  -GJ     LTG 2 Progress New  -GJ     LTG 3 pt. to demonstrate >/= 75% of full AROM of L spine in all planes without pain to facilitate ease with dressing  -GJ     LTG 3 Progress New  -GJ     Time Calculation    PT Goal Re-Cert Due Date 02/23/18  -GJ       User Key  (r) = Recorded By, (t) = Taken By, (c) = Cosigned By    Initials Name Provider Type    LUIS ALFREDO Purcell, PT Physical Therapist                PT Assessment/Plan       12/22/17 1216       PT Assessment    Functional Limitations Performance in self-care ADL;Limitation in home management;Performance in leisure activities;Limitations in functional capacity and performance;Limitations in community activities  -GJ     Impairments Impaired flexibility;Muscle strength;Pain;Impaired muscle length;Range of motion  -GJ     Assessment Comments Ms. Hurley is a 68 y/o  female.  She reports falling from a ladder in 9/2017.  Once her bruising subsided, about 2-3 weeks after her fall she noted L lateral hip, to knee to bottom of her foot pain which has not gotten better, even after completing a steroid dose pack 2 days ago.  Aggravating activities include donning socks/shoes, bending forward.  Walking is not bothersome.  Sleeping is uncomfortable.  She is active in Yoga.  No relieving positions.  Pain described as deep/dull ache, occasional numbness of her L foot.  She does have previous history of R HS pull/tear, she reports previous history of R hip bursitis.  Ms. Hurley demonstrates limited L knee active extension while seated, limited L SLR passively secondary to pain at upper ranges (not low which would be more indicative of nerve root involvement).    She demonstrates (+) trigger points L glut medius/pirformis tissues.  She demonstrates normal reflexes, normal and symmetrical myotomal strength.  Following DDN, she demonstrates improved L SLR to 90, and an ability to fully extend L knee in sitting.  Ms. Hurley demonstrates stable s/s consistent with trigger point/soft tissue injury which limits her ability to fully participate in self care, household/community mobility.  She may benefit from skilled physical therapy intervention to address the above impairments.   -GJ     Please refer to paper survey for additional self-reported information Yes  -GJ     Rehab Potential Excellent  -GJ     Patient/caregiver participated in establishment of treatment plan and goals Yes  -GJ     Patient would benefit from skilled therapy intervention Yes  -GJ     PT Plan    PT Frequency 2x/week  -GJ     Predicted Duration of Therapy Intervention (days/wks) 4 weeks   -GJ     Planned CPT's? PT EVAL LOW COMPLEXITY: 55588;PT RE-EVAL: 21507;PT THER PROC EA 15 MIN: 98357;PT MANUAL THERAPY EA 15 MIN: 20411;PT HOT OR COLD PACK TREAT MCARE;PT ELECTRICAL STIM UNATTEND: ;PT ULTRASOUND EA 15 MIN: 20869;PT  TRACTION LUMBAR: 06656  -     PT Plan Comments assess response to DDN L glut tissues, repeat as necessary and appropriate.  initiate hip strengthening program, warm up on nustep vs bike, HR, SLS shoulder ext, standing hip abd/ext, standing HS curl, SL hip abd vs. clam, piriformis stretch and HS stretch   -       User Key  (r) = Recorded By, (t) = Taken By, (c) = Cosigned By    Initials Name Provider Type     Dante Purcell PT Physical Therapist                 Manual Rx (last 36 hours)      Manual Treatments       12/22/17 1100          Manual Rx 1    Manual Rx 1 Location intermuscular manual therapy  - Assessed pt. for Dry Needling and intramuscular manual therapy. Discussed risks/benefits of Dry Needling with pt, including but not limited to muscle soreness, bruising, vasovagal response, pneumothorax, nerve injury. Patient signed written consent to proceed with treatment.    Patient position during treatment: R SL with pillow between knees.   Muscles treated: L piriformis, L glut medius  Response to treatment: several moderate to large LTR's.  Pt. Demonstrated L SLR to 90 degrees and able to fully ext L knee in sitting post DDN. No immediate adverse response.     palpation used before, during, and after to facilitate assessment Clean needle technique observed at all times, precautions for lung fields, neurovascular structures observed.    DDN performed by Dante Purcell II, PT, DPT       User Key  (r) = Recorded By, (t) = Taken By, (c) = Cosigned By    Initials Name Provider Type     Dante Purcell PT Physical Therapist                      Outcome Measure Options: Modifed Owestry  Modified Oswestry  Modified Oswestry Score/Comments: not completed      Time Calculation:   Start Time: 0915  Stop Time: 1000  Time Calculation (min): 45 min     Therapy Charges for Today     Code Description Service Date Service Provider Modifiers Qty    17298069271 HC PT CHNG MAIN POS CURRENT 12/22/2017 Dante Purcell PT  GP, CJ 1    24954228963 HC PT CHNG MAIN POS PROJECTED 12/22/2017 Dante Purcell, PT GP, CI 1    [id number] HC PT EVAL LOW COMPLEXITY 2 12/22/2017 Dante Purcell, PT GP 1    78225210428 HC PT MANUAL THERAPY EA 15 MIN 12/22/2017 Dante Purcell, PT GP 1          PT G-Codes  PT Professional Judgement Used?: Yes  Outcome Measure Options: Modifed Owestry  Functional Limitation: Changing and maintaining body position  Changing and Maintaining Body Position Current Status (): At least 20 percent but less than 40 percent impaired, limited or restricted  Changing and Maintaining Body Position Goal Status (): At least 1 percent but less than 20 percent impaired, limited or restricted         Dante Purcell, PT  12/22/2017

## 2017-12-29 ENCOUNTER — HOSPITAL ENCOUNTER (OUTPATIENT)
Dept: PHYSICAL THERAPY | Facility: HOSPITAL | Age: 67
Setting detail: THERAPIES SERIES
Discharge: HOME | End: 2017-12-29

## 2017-12-29 DIAGNOSIS — M79.10 TRIGGER POINT: ICD-10-CM

## 2017-12-29 DIAGNOSIS — M54.5 LEFT LOW BACK PAIN, UNSPECIFIED CHRONICITY, WITH SCIATICA PRESENCE UNSPECIFIED: Primary | ICD-10-CM

## 2017-12-29 PROCEDURE — 97110 THERAPEUTIC EXERCISES: CPT | Performed by: PHYSICAL THERAPIST

## 2017-12-29 PROCEDURE — 97140 MANUAL THERAPY 1/> REGIONS: CPT | Performed by: PHYSICAL THERAPIST

## 2017-12-29 NOTE — THERAPY TREATMENT NOTE
Outpatient Physical Therapy Ortho Treatment Note  [facility]     Patient Name: Eve Hurley  : 1950  MRN: [id number]  Today's Date: 2017      Visit Date: 2017    Visit Dx:    ICD-10-CM ICD-9-CM   1. Left low back pain, unspecified chronicity, with sciatica presence unspecified M54.5 724.2   2. Trigger point M79.1 729.1       Patient Active Problem List   Diagnosis   • Hypothyroidism   • Arthralgia of hip   • Osteoporosis   • Hearing difficulty   • Rectal bleeding   • Diverticulitis of intestine without perforation or abscess without bleeding   • Senile osteoporosis   • Abnormal CT of the abdomen   • Buttock trauma   • Hematoma        Past Medical History:   Diagnosis Date   • Actinic keratoses    • Anemia    • Atypical chest pain 2009    SEEN AT [facility] ER   • DDD (degenerative disc disease), cervical    • Disease of thyroid gland     HYPOTHYROIDISM   • Disorder of cecum 2017    CT OF ABD AND PELVIS AT [facility] SHOWED THICKENING IN CECUM, POSSIBLE MASS   • Diverticulitis     MOST RECENTLY ON 19, HAS HAD 4-5 EPISODES IN PAST 20 YEARS.    • Fall    • Fibrocystic breast    • Hearing difficulty    • Hip joint pain    • Hypertension    • Left lower quadrant pain 2004    CT SCAN AT [facility] SHOWED ESSENTIALLY NORMAL EXCEPT 2 VERY SMALL HEPATIC CYSTS AND CYSTIC STRUCTURE ON THE LEFT SIDE OF OF THE PELVIS REPRESENTING A 2CM OVARIAN CYST   • Lower extremity pain, left 06/15/2012    VENOUS DOPLER AT [facility] WAS WNL   • Osteoporosis     GETS PROLIA INJECTIONS   • Perforated ear drum 2014    MICROPERFORATION, LEFT EAR, SAW DR. GREGORY LAGOS   • PONV (postoperative nausea and vomiting)    • Rectal bleed 03/15/2016   • Renal insufficiency 2016   • Vegetarian     VEGAN        Past Surgical History:   Procedure Laterality Date   • BREAST SURGERY Right     CYST ASPIRATION   •  SECTION N/A    • COLONOSCOPY N/A 2004    DIVERTICULOSIS, REDUNDANT SIGMOID, DR. KELLEN ROBERTS  AT [place]   • COLONOSCOPY N/A 8/30/2017    Procedure: COLONOSCOPY INTO CECUM;  Surgeon: Marito Dunbar MD;  Location: [facility] ENDOSCOPY;  Service:    • HEMORRHOIDECTOMY N/A 11/13/2009    DR. KELLEN ROBERTS AT [place]   • TUBAL ABDOMINAL LIGATION Bilateral 1986                             PT Assessment/Plan       12/29/17 3504       PT Assessment    Assessment Comments First session since initial evaluation. Patient reports initial relief with dry needling to gluteal tissue but return of symptoms down into lower leg after about 36 hours. Reports she had difficulty performing Yoga poses today especially forward fold and supine with LLE up in air. Educated that both positions placed the same strain on the irritated tissue (one from top down one from bottom up). Suggested she hold on any yoga pose that increases her discomfort. Provided alternative with supine stretching and low level core strengthening program. Manual therapy performed with notable trigger point in L piriformis and L gluteus minimus. Pain decreased at end of session with resolution of lower leg symptoms (just in buttock).   -CK     PT Plan    PT Plan Comments Consider DDN to piriformis, minimus tissue. Continue low level strengthening program. Assess response to last session. Warm up on Nustep. Consider addition of hip abd/ext standing vs. SL as appropriate.   -CK       User Key  (r) = Recorded By, (t) = Taken By, (c) = Cosigned By    Initials Name Provider Type    CK Gonzalez Wilcox, PT Physical Therapist                Modalities       12/29/17 1500          Ice    Ice Applied Yes  -CK      Location Lumbar/gluteal tissue, wrapped around L hip. Supine 90/90  -CK      Rx Minutes 12 mins  -CK      Ice S/P Rx Yes  -CK        User Key  (r) = Recorded By, (t) = Taken By, (c) = Cosigned By    Initials Name Provider Type    CK Gonzalez Wilcox, PT Physical Therapist                Exercises       12/29/17 1500          Subjective Comments    Subjective Comments The  needling helped but it didnt last. I have pain back down the leg like it was when it started again.   -CK      Subjective Pain    Able to rate subjective pain? yes  -CK      Pre-Treatment Pain Level 5  -CK      Post-Treatment Pain Level 3  -CK      Exercise 1    Exercise Name 1 Calf stretch off edge of step  -CK      Reps 1 3  -CK      Time (Seconds) 1 30  -CK      Exercise 2    Exercise Name 2 Supine 90/90 HS with sciatic floss  -CK      Reps 2 3  -CK      Time (Seconds) 2 10  -CK      Additional Comments DF/PF  -CK      Exercise 3    Exercise Name 3 PPT  -CK      Reps 3 15  -CK      Time (Seconds) 3 5  -CK      Exercise 4    Exercise Name 4 Piriformis stretch-B  -CK      Reps 4 3  -CK      Time (Seconds) 4 30  -CK      Exercise 5    Exercise Name 5 LTR with TA  -CK      Reps 5 10  -CK      Time (Seconds) 5 5  -CK      Exercise 6    Exercise Name 6 Bridge with adduction  -CK      Reps 6 10  -CK      Time (Seconds) 6 5  -CK      Exercise 7    Exercise Name 7 shoulder ext  -CK      Cueing 7 Demo  -CK      Reps 7 15  -CK      Additional Comments YTB, Together and alternating  -CK        User Key  (r) = Recorded By, (t) = Taken By, (c) = Cosigned By    Initials Name Provider Type    CK Gonzalez Wilcox, PT Physical Therapist                        Manual Rx (last 36 hours)      Manual Treatments       12/29/17 1500          Manual Rx 1    Manual Rx 1 Location Prone STM/MFR/Trigger point release to L gluteal tissue.  -CK      Manual Rx 1 Type Pt prone with nose in hole and feet draped over pillow for comfort.  -CK      Manual Rx 1 Grade Noted trigger points in L Gluteal tissue that reproduced symptoms  -CK      Manual Rx 1 Duration Time- 16 min  -CK        User Key  (r) = Recorded By, (t) = Taken By, (c) = Cosigned By    Initials Name Provider Type    CK Gonzalez S Brenden, PT Physical Therapist                PT OP Goals       12/29/17 1500       PT Short Term Goals    STG Date to Achieve 01/19/18  -CK     STG 1 pt. to be I  with initial HEP to facilitate self management of their condition  -CK     STG 1 Progress Ongoing  -CK     STG 1 Progress Comments given today  -CK     STG 2 pt. to be educated in/verbalize understanding of the importance of posture/ergonomics in association with their condition to facilitate self management of their condition  -CK     STG 2 Progress Ongoing  -CK     STG 2 Progress Comments back book given. discussed holding on forward flexion activities in Yoga at this time  -CK     STG 3 pt to demonstrate full L knee ext in sitting to facilitate ease of performing household activities  -CK     STG 3 Progress Ongoing  -CK     Long Term Goals    LTG Date to Achieve 02/23/18  -CK     LTG 1 pt. to be I with advanced HEP to facilitate self management of their condition  -CK     LTG 1 Progress Ongoing  -CK     LTG 2 pt to demonstrate/report donning/doffing shoes/socks on L without exacerbation of L hip pain to facilitate ease with self care activities  -CK     LTG 2 Progress Ongoing  -CK     LTG 3 pt. to demonstrate >/= 75% of full AROM of L spine in all planes without pain to facilitate ease with dressing  -CK     LTG 3 Progress Ongoing  -CK       User Key  (r) = Recorded By, (t) = Taken By, (c) = Cosigned By    Initials Name Provider Type    CK Gonzalez Wilcox, PT Physical Therapist          Therapy Education  Education Details: Lumbar Back Book given for self educating on exacerbating body mechanics. Suggested using after after exercises to calm irritated tissue.   Given: HEP, Symptoms/condition management, Posture/body mechanics  Program: New  How Provided: Verbal, Demonstration, Written  Provided to: Patient  Level of Understanding: Teach back education performed, Verbalized              Time Calculation:   Start Time: 1445  Stop Time: 1545  Time Calculation (min): 60 min    Therapy Charges for Today     Code Description Service Date Service Provider Modifiers Qty    74953690318 HC PT THER PROC EA 15 MIN 12/29/2017  Gonzalez Wilcox, PT GP 2    05144219493 HC PT HOT OR COLD PACK TREAT MCARE 12/29/2017 Gonzalez Wilcox, PT GP 1    05964152836 HC PT MANUAL THERAPY EA 15 MIN 12/29/2017 Gonzalez Wilcox, PT GP 1                    Gonzalez Wilcox, PT  12/29/2017

## 2018-01-04 ENCOUNTER — HOSPITAL ENCOUNTER (OUTPATIENT)
Dept: PHYSICAL THERAPY | Facility: HOSPITAL | Age: 68
Setting detail: THERAPIES SERIES
Discharge: HOME | End: 2018-01-04

## 2018-01-04 DIAGNOSIS — M79.10 TRIGGER POINT: ICD-10-CM

## 2018-01-04 DIAGNOSIS — M54.5 LEFT LOW BACK PAIN, UNSPECIFIED CHRONICITY, WITH SCIATICA PRESENCE UNSPECIFIED: Primary | ICD-10-CM

## 2018-01-04 PROCEDURE — 97140 MANUAL THERAPY 1/> REGIONS: CPT | Performed by: PHYSICAL THERAPIST

## 2018-01-04 PROCEDURE — 97110 THERAPEUTIC EXERCISES: CPT | Performed by: PHYSICAL THERAPIST

## 2018-01-04 NOTE — THERAPY TREATMENT NOTE
Outpatient Physical Therapy Ortho Treatment Note  [facility]     Patient Name: Eve Hurley  : 1950  MRN: [id number]  Today's Date: 2018      Visit Date: 2018    Visit Dx:    ICD-10-CM ICD-9-CM   1. Left low back pain, unspecified chronicity, with sciatica presence unspecified M54.5 724.2   2. Trigger point M79.1 729.1       Patient Active Problem List   Diagnosis   • Hypothyroidism   • Arthralgia of hip   • Osteoporosis   • Hearing difficulty   • Rectal bleeding   • Diverticulitis of intestine without perforation or abscess without bleeding   • Senile osteoporosis   • Abnormal CT of the abdomen   • Buttock trauma   • Hematoma        Past Medical History:   Diagnosis Date   • Actinic keratoses    • Anemia    • Atypical chest pain 2009    SEEN AT [facility] ER   • DDD (degenerative disc disease), cervical    • Disease of thyroid gland     HYPOTHYROIDISM   • Disorder of cecum 2017    CT OF ABD AND PELVIS AT [facility] SHOWED THICKENING IN CECUM, POSSIBLE MASS   • Diverticulitis     MOST RECENTLY ON 19, HAS HAD 4-5 EPISODES IN PAST 20 YEARS.    • Fall    • Fibrocystic breast    • Hearing difficulty    • Hip joint pain    • Hypertension    • Left lower quadrant pain 2004    CT SCAN AT [facility] SHOWED ESSENTIALLY NORMAL EXCEPT 2 VERY SMALL HEPATIC CYSTS AND CYSTIC STRUCTURE ON THE LEFT SIDE OF OF THE PELVIS REPRESENTING A 2CM OVARIAN CYST   • Lower extremity pain, left 06/15/2012    VENOUS DOPLER AT [facility] WAS WNL   • Osteoporosis     GETS PROLIA INJECTIONS   • Perforated ear drum 2014    MICROPERFORATION, LEFT EAR, SAW DR. GREGORY LAGOS   • PONV (postoperative nausea and vomiting)    • Rectal bleed 03/15/2016   • Renal insufficiency 2016   • Vegetarian     VEGAN        Past Surgical History:   Procedure Laterality Date   • BREAST SURGERY Right     CYST ASPIRATION   •  SECTION N/A    • COLONOSCOPY N/A 2004    DIVERTICULOSIS, REDUNDANT SIGMOID, DR. KELLEN ROBERTS AT  "[place]   • COLONOSCOPY N/A 8/30/2017    Procedure: COLONOSCOPY INTO CECUM;  Surgeon: Marito Dunbar MD;  Location: [facility] ENDOSCOPY;  Service:    • HEMORRHOIDECTOMY N/A 11/13/2009    DR. KELLEN ROBERTS AT [place]   • TUBAL ABDOMINAL LIGATION Bilateral 1986                             PT Assessment/Plan       01/04/18 1627       PT Assessment    Assessment Comments Ms. Hurley returns reporting overall improvement in her condition, however reports another exacerbation of her L hip. We worked on hip girdle strengthening including CKC/OCK activities.  We reviewed activity modifications and revisited DDN of the L glut minimus/medius tissues.  She demosntrated minimla palpable LTR\"s however reported feeling a lot of twitches.    -GJ     PT Plan    PT Plan Comments assess response to DDN.  Continue to strengthen hip girdle, consider hip abd/ext in standing next session with strong focus on form.  Reasses seated L knee ext and ability to don/doff shores.  -GJ       User Key  (r) = Recorded By, (t) = Taken By, (c) = Cosigned By    Initials Name Provider Type    GJ Dante Purcell, PT Physical Therapist                    Exercises       01/04/18 0900          Subjective Comments    Subjective Comments Overall I feel better. The deep tissue work last time seemed to help too.  If I forget to ice after yoga I seem to have more pain.  I did have a flare up again yesterday. It seems the further i get away from my last appointment the more it bothers me again.   -GJ      Subjective Pain    Pre-Treatment Pain Level 3  -GJ      Exercise 2    Exercise Name 2 Supine 90/90 HS with sciatic floss  -GJ      Cueing 2 Verbal  -GJ      Time (Seconds) 2 10  -GJ      Additional Comments with DF/PF  -GJ      Exercise 3    Exercise Name 3 PPT  -GJ      Reps 3 15  -GJ      Time (Seconds) 3 5  -GJ      Exercise 4    Exercise Name 4 Piriformis stretch-B  -GJ      Reps 4 3  -GJ      Time (Seconds) 4 30  -GJ      Exercise 6    Exercise Name 6 Bridge " with adduction  -GJ      Cueing 6 Verbal  -GJ      Reps 6 15  -GJ      Time (Seconds) 6 5  -GJ      Exercise 7    Exercise Name 7 shoulder ext, SLS, bilatealliy   -GJ      Cueing 7 Demo  -GJ      Sets 7 2  -GJ      Reps 7 10  -GJ      Exercise 8    Exercise Name 8 nustep, UE/LE  -GJ      Cueing 8 Verbal  -GJ      Time (Minutes) 8 5  -GJ      Exercise 9    Exercise Name 9 SL hip abd, bilaterally  -GJ      Cueing 9 Demo  -GJ      Sets 9 2  -GJ      Reps 9 10  -GJ      Exercise 10    Exercise Name 10 prone hip ext, bilateral   -GJ      Cueing 10 Verbal  -GJ      Reps 10 15  -GJ        User Key  (r) = Recorded By, (t) = Taken By, (c) = Cosigned By    Initials Name Provider Type    LUIS ALFREDO Purcell PT Physical Therapist                        Manual Rx (last 36 hours)      Manual Treatments       01/04/18 0900          Manual Rx 2    Manual Rx 2 Location intramuscular manual therapy  -GJ Assessed pt. for Dry Needling and intramuscular manual therapy. Discussed risks/benefits of Dry Needling with pt, including but not limited to muscle soreness, bruising, vasovagal response, pneumothorax, nerve injury. Patient signed written consent to proceed with treatment.    Patient position during treatment: R SL, pillow between knees.   Muscles treated: L glut minimus, L glut medius.   Response to treatment: minimal palpable LTR's, however pt reported feeling several twitches.  No immediate adverse response.     palpation used before, during, and after to facilitate assessment Clean needle technique observed at all times, precautions for lung fields, neurovascular structures observed.    DDN performed by Dante Purcell II, PT, DPT       User Key  (r) = Recorded By, (t) = Taken By, (c) = Cosigned By    Initials Name Provider Type    LUIS ALFREDO Purcell PT Physical Therapist                PT OP Goals       01/04/18 0900       PT Short Term Goals    STG Date to Achieve 01/19/18  -GJ     STG 1 pt. to be I with initial HEP to  facilitate self management of their condition  -GJ     STG 1 Progress Met  -GJ     STG 2 pt. to be educated in/verbalize understanding of the importance of posture/ergonomics in association with their condition to facilitate self management of their condition  -GJ     STG 2 Progress Ongoing;Partially Met  -GJ     STG 2 Progress Comments reviewed activity modification   -GJ     STG 3 pt to demonstrate full L knee ext in sitting to facilitate ease of performing household activities  -GJ     STG 3 Progress Ongoing  -GJ     Long Term Goals    LTG Date to Achieve 02/23/18  -GJ     LTG 1 pt. to be I with advanced HEP to facilitate self management of their condition  -GJ     LTG 1 Progress Ongoing  -GJ     LTG 2 pt to demonstrate/report donning/doffing shoes/socks on L without exacerbation of L hip pain to facilitate ease with self care activities  -GJ     LTG 2 Progress Ongoing  -GJ     LTG 3 pt. to demonstrate >/= 75% of full AROM of L spine in all planes without pain to facilitate ease with dressing  -GJ     LTG 3 Progress Ongoing  -       User Key  (r) = Recorded By, (t) = Taken By, (c) = Cosigned By    Initials Name Provider Type     Dante Purcell, PT Physical Therapist          Therapy Education  Education Details: reviewed activity  modifications, reviewed idea of avoiding yoga poses like bending forward to touch the ground.  HEP to be once every other day for strengthening  Given: HEP, Symptoms/condition management, Pain management, Posture/body mechanics, Mobility training  Program: New  How Provided: Verbal, Demonstration, Written  Provided to: Patient  Level of Understanding: Teach back education performed, Verbalized, Demonstrated              Time Calculation:   Start Time: 0915  Stop Time: 1000  Time Calculation (min): 45 min    Therapy Charges for Today     Code Description Service Date Service Provider Modifiers Qty    66874836440 HC PT THER PROC EA 15 MIN 1/4/2018 Dante Purcell, PT GP 2     [id number]  PT MANUAL THERAPY EA 15 MIN 1/4/2018 Dante Purcell, PT GP 1                    Dante Purcell, PT  1/4/2018

## 2018-01-09 ENCOUNTER — HOSPITAL ENCOUNTER (OUTPATIENT)
Dept: PHYSICAL THERAPY | Facility: HOSPITAL | Age: 68
Setting detail: THERAPIES SERIES
Discharge: HOME | End: 2018-01-09

## 2018-01-09 DIAGNOSIS — M79.10 TRIGGER POINT: ICD-10-CM

## 2018-01-09 DIAGNOSIS — M54.5 LEFT LOW BACK PAIN, UNSPECIFIED CHRONICITY, WITH SCIATICA PRESENCE UNSPECIFIED: Primary | ICD-10-CM

## 2018-01-09 PROCEDURE — 97012 MECHANICAL TRACTION THERAPY: CPT | Performed by: PHYSICAL THERAPIST

## 2018-01-09 PROCEDURE — 97110 THERAPEUTIC EXERCISES: CPT | Performed by: PHYSICAL THERAPIST

## 2018-01-09 NOTE — THERAPY TREATMENT NOTE
Outpatient Physical Therapy Ortho Treatment Note  [facility]     Patient Name: Eve Hurley  : 1950  MRN: [id number]  Today's Date: 2018      Visit Date: 2018    Visit Dx:    ICD-10-CM ICD-9-CM   1. Left low back pain, unspecified chronicity, with sciatica presence unspecified M54.5 724.2   2. Trigger point M79.1 729.1       Patient Active Problem List   Diagnosis   • Hypothyroidism   • Arthralgia of hip   • Osteoporosis   • Hearing difficulty   • Rectal bleeding   • Diverticulitis of intestine without perforation or abscess without bleeding   • Senile osteoporosis   • Abnormal CT of the abdomen   • Buttock trauma   • Hematoma        Past Medical History:   Diagnosis Date   • Actinic keratoses    • Anemia    • Atypical chest pain 2009    SEEN AT [facility] ER   • DDD (degenerative disc disease), cervical    • Disease of thyroid gland     HYPOTHYROIDISM   • Disorder of cecum 2017    CT OF ABD AND PELVIS AT [facility] SHOWED THICKENING IN CECUM, POSSIBLE MASS   • Diverticulitis     MOST RECENTLY ON 19, HAS HAD 4-5 EPISODES IN PAST 20 YEARS.    • Fall    • Fibrocystic breast    • Hearing difficulty    • Hip joint pain    • Hypertension    • Left lower quadrant pain 2004    CT SCAN AT [facility] SHOWED ESSENTIALLY NORMAL EXCEPT 2 VERY SMALL HEPATIC CYSTS AND CYSTIC STRUCTURE ON THE LEFT SIDE OF OF THE PELVIS REPRESENTING A 2CM OVARIAN CYST   • Lower extremity pain, left 06/15/2012    VENOUS DOPLER AT [facility] WAS WNL   • Osteoporosis     GETS PROLIA INJECTIONS   • Perforated ear drum 2014    MICROPERFORATION, LEFT EAR, SAW DR. GREGORY LAGOS   • PONV (postoperative nausea and vomiting)    • Rectal bleed 03/15/2016   • Renal insufficiency 2016   • Vegetarian     VEGAN        Past Surgical History:   Procedure Laterality Date   • BREAST SURGERY Right     CYST ASPIRATION   •  SECTION N/A    • COLONOSCOPY N/A 2004    DIVERTICULOSIS, REDUNDANT SIGMOID, DR. KELLEN ROBERTS AT  [place]   • COLONOSCOPY N/A 8/30/2017    Procedure: COLONOSCOPY INTO CECUM;  Surgeon: Marito Dunbar MD;  Location: [facility] ENDOSCOPY;  Service:    • HEMORRHOIDECTOMY N/A 11/13/2009    DR. KELLEN ROBERTS AT [place]   • TUBAL ABDOMINAL LIGATION Bilateral 1986                             PT Assessment/Plan       01/09/18 1628       PT Assessment    Assessment Comments Patient with fluctuating reports of improvement vs. not since initiating sessions. States she still is unable to don/doff her shoes without issue. Pain/stiffness worse in AM. Suggested she perform her stretching/gentle core HEP in AM PRIOR to getting out of bed to increase blood flow and decrease muscle stiffness. She verbalized understanding. Trial of mechanical lumbar traction today to address L hip/LLE symptoms. No immediate adverse effects reported. Educated on the normal side effects of traction.   -CK     PT Plan    PT Plan Comments Assess response to traction. Continue core/hip strengthening. Continue traction as needed.   -CK       User Key  (r) = Recorded By, (t) = Taken By, (c) = Cosigned By    Initials Name Provider Type    YVON Wilcox, PT Physical Therapist                Modalities       01/09/18 1500          Moist Heat    MH Applied Yes   underneath during traction  -CK      Location T/L/S  -CK      Rx Minutes 15 mins  -CK      Traction 48824    Traction Type Lumbar  -CK      Rx Minutes 15 min  -CK      Duration Intermittent  -CK      Position Hook-lying  -CK      Weight --   45/25  -CK      Hold 40  -CK      Relax 15  -CK        User Key  (r) = Recorded By, (t) = Taken By, (c) = Cosigned By    Initials Name Provider Type    YVON Wilcox, PT Physical Therapist                Exercises       01/09/18 1500          Subjective Comments    Subjective Comments I feel about the same overall. Some days I dont have pain but other days I have the pain going down my leg to my toes. Right now its mostly in my buttock. I didnt notice much  lasting relief with the needling last session.   -CK      Subjective Pain    Able to rate subjective pain? yes  -CK      Pre-Treatment Pain Level 3  -CK      Exercise 1    Exercise Name 1 Calf stretch off edge of step  -CK      Reps 1 3  -CK      Time (Seconds) 1 30  -CK      Exercise 2    Exercise Name 2 Supine 90/90 HS with sciatic floss  -CK      Cueing 2 Verbal  -CK      Time (Seconds) 2 20  -CK      Additional Comments with DF/PF  -CK      Exercise 3    Exercise Name 3 PPT  -CK      Reps 3 15  -CK      Time (Seconds) 3 5  -CK      Exercise 4    Exercise Name 4 Piriformis stretch-B  -CK      Reps 4 2  -CK      Time (Seconds) 4 30  -CK      Additional Comments reach through  -CK      Exercise 6    Exercise Name 6 Bridge with adduction  -CK      Cueing 6 Verbal  -CK      Reps 6 15  -CK      Time (Seconds) 6 5  -CK      Exercise 7    Exercise Name 7 shoulder ext, SLS, bilatealliy   -CK      Cueing 7 Demo  -CK      Sets 7 2  -CK      Reps 7 10  -CK      Exercise 8    Exercise Name 8 nustep, UE/LE  -CK      Cueing 8 Verbal  -CK      Time (Minutes) 8 5  -CK      Additional Comments L4  -CK      Exercise 9    Exercise Name 9 SL hip abd, bilaterally  -CK      Cueing 9 Verbal  -CK      Sets 9 2  -CK      Reps 9 10  -CK      Additional Comments Vcs for TA  -CK      Exercise 10    Exercise Name 10 prone hip ext, bilateral   -CK      Cueing 10 Verbal  -CK      Sets 10 2  -CK      Reps 10 10  -CK      Additional Comments Vcs for TA  -CK        User Key  (r) = Recorded By, (t) = Taken By, (c) = Cosigned By    Initials Name Provider Type    CK Gonzalez Wilcox PT Physical Therapist                                            Time Calculation:   Start Time: 1545  Stop Time: 1630  Time Calculation (min): 45 min    Therapy Charges for Today     Code Description Service Date Service Provider Modifiers Qty    86020596277  PT HOT OR COLD PACK TREAT MCARE 1/9/2018 Gonzalez Wilcox PT GP 1    [id number]  PT TRACTION LUMBAR 1/9/2018  Gonzalez Wilcox, PT GP 1    [id number]  PT THER PROC EA 15 MIN 1/9/2018 Gonzalez Wilcox, PT GP 2                    Gonzalez Wilcox, PT  1/9/2018

## 2018-01-11 ENCOUNTER — HOSPITAL ENCOUNTER (OUTPATIENT)
Dept: PHYSICAL THERAPY | Facility: HOSPITAL | Age: 68
Setting detail: THERAPIES SERIES
Discharge: HOME | End: 2018-01-11

## 2018-01-11 DIAGNOSIS — M79.10 TRIGGER POINT: ICD-10-CM

## 2018-01-11 DIAGNOSIS — M54.5 LEFT LOW BACK PAIN, UNSPECIFIED CHRONICITY, WITH SCIATICA PRESENCE UNSPECIFIED: Primary | ICD-10-CM

## 2018-01-11 PROCEDURE — 97012 MECHANICAL TRACTION THERAPY: CPT | Performed by: PHYSICAL THERAPIST

## 2018-01-11 PROCEDURE — 97110 THERAPEUTIC EXERCISES: CPT | Performed by: PHYSICAL THERAPIST

## 2018-01-11 NOTE — THERAPY TREATMENT NOTE
Outpatient Physical Therapy Ortho Treatment Note  [facility]     Patient Name: Eve Hurley  : 1950  MRN: [id number]  Today's Date: 2018      Visit Date: 2018    Visit Dx:    ICD-10-CM ICD-9-CM   1. Left low back pain, unspecified chronicity, with sciatica presence unspecified M54.5 724.2   2. Trigger point M79.1 729.1       Patient Active Problem List   Diagnosis   • Hypothyroidism   • Arthralgia of hip   • Osteoporosis   • Hearing difficulty   • Rectal bleeding   • Diverticulitis of intestine without perforation or abscess without bleeding   • Senile osteoporosis   • Abnormal CT of the abdomen   • Buttock trauma   • Hematoma        Past Medical History:   Diagnosis Date   • Actinic keratoses    • Anemia    • Atypical chest pain 2009    SEEN AT [facility] ER   • DDD (degenerative disc disease), cervical    • Disease of thyroid gland     HYPOTHYROIDISM   • Disorder of cecum 2017    CT OF ABD AND PELVIS AT [facility] SHOWED THICKENING IN CECUM, POSSIBLE MASS   • Diverticulitis     MOST RECENTLY ON 17, HAS HAD 4-5 EPISODES IN PAST 20 YEARS.    • Fall    • Fibrocystic breast    • Hearing difficulty    • Hip joint pain    • Hypertension    • Left lower quadrant pain 2004    CT SCAN AT [facility] SHOWED ESSENTIALLY NORMAL EXCEPT 2 VERY SMALL HEPATIC CYSTS AND CYSTIC STRUCTURE ON THE LEFT SIDE OF OF THE PELVIS REPRESENTING A 2CM OVARIAN CYST   • Lower extremity pain, left 06/15/2012    VENOUS DOPLER AT [facility] WAS WNL   • Osteoporosis     GETS PROLIA INJECTIONS   • Perforated ear drum 2014    MICROPERFORATION, LEFT EAR, SAW DR. GREGORY LAGOS   • PONV (postoperative nausea and vomiting)    • Rectal bleed 03/15/2016   • Renal insufficiency 2016   • Vegetarian     VEGAN        Past Surgical History:   Procedure Laterality Date   • BREAST SURGERY Right     CYST ASPIRATION   •  SECTION N/A    • COLONOSCOPY N/A 2004    DIVERTICULOSIS, REDUNDANT SIGMOID, DR. KELLEN ROBERTS AT  [place]   • COLONOSCOPY N/A 8/30/2017    Procedure: COLONOSCOPY INTO CECUM;  Surgeon: Marito Dunbar MD;  Location: [facility] ENDOSCOPY;  Service:    • HEMORRHOIDECTOMY N/A 11/13/2009    DR. KELLEN ROBERTS AT [place]   • TUBAL ABDOMINAL LIGATION Bilateral 1986                             PT Assessment/Plan       01/11/18 1629       PT Assessment    Assessment Comments Reports temporary relief with traction from last session. Continued with core/LE strengthening adding 2lb ankle weights. No immediate increase in discomfort or radicular symptoms. Progressed traction by 3 lbs of pull today, also with no immediate adverse effects. Will continue to monitor for response and treat accordingly. Plan to return to MD for discussion/further imaging if no improvement in next 2-3 sessions.   -CK     PT Plan    PT Plan Comments Assess response to traction, continue if helpful. Core/hip strengthening.   -CK       User Key  (r) = Recorded By, (t) = Taken By, (c) = Cosigned By    Initials Name Provider Type    CK Gonzalez Wilcox, PT Physical Therapist                Modalities       01/11/18 1500          Moist Heat    MH Applied Yes   underneath during traction  -CK      Location T/L/S  -CK      Rx Minutes 15 mins  -CK      Traction 37522    Traction Type Lumbar  -CK      Rx Minutes 15 min  -CK      Duration Intermittent  -CK      Position Hook-lying  -CK      Weight --   48/25  -CK      Hold 40  -CK      Relax 15  -CK        User Key  (r) = Recorded By, (t) = Taken By, (c) = Cosigned By    Initials Name Provider Type    CK Gonzalez Wilcox, PT Physical Therapist                Exercises       01/11/18 1500          Subjective Comments    Subjective Comments I did the exercises before I got out of bed, which helped but it is still present intermittently down to my foot. Pain is worse in AM when sitting to read in my chair or bending forward to put my shoes on.  -CK      Subjective Pain    Able to rate subjective pain? yes  -CK       Pre-Treatment Pain Level 3  -CK      Exercise 1    Exercise Name 1 Calf stretch off edge of step  -CK      Reps 1 3  -CK      Time (Seconds) 1 30  -CK      Exercise 2    Exercise Name 2 HS stretch on step  -CK      Cueing 2 Verbal  -CK      Reps 2 3  -CK      Time (Seconds) 2 20  -CK      Exercise 3    Exercise Name 3 PPT  -CK      Reps 3 10  -CK      Time (Seconds) 3 10  -CK      Exercise 4    Exercise Name 4 Piriformis stretch-B  -CK      Reps 4 2  -CK      Time (Seconds) 4 30  -CK      Additional Comments reach through  -CK      Exercise 5    Exercise Name 5 LAQ with TA  -CK      Reps 5 10  -CK      Time (Seconds) 5 5  -CK      Additional Comments 2#  -CK      Exercise 6    Exercise Name 6 Bridges, BLE on ball  -CK      Reps 6 15  -CK      Time (Seconds) 6 5  -CK      Exercise 7    Exercise Name 7 shoulder ext, SLS, bilatealliy   -CK      Cueing 7 Demo  -CK      Sets 7 2  -CK      Reps 7 10  -CK      Additional Comments then Feet together, alternating  -CK      Exercise 9    Exercise Name 9 SL hip abd, bilaterally  -CK      Cueing 9 Verbal  -CK      Reps 9 15  -CK      Additional Comments #2 with TA  -CK      Exercise 10    Exercise Name 10 prone supermans with TA  -CK      Cueing 10 Verbal  -CK      Reps 10 12  -CK      Exercise 11    Exercise Name 11 HS curls with TA  -CK      Reps 11 15  -CK      Additional Comments #2  -CK        User Key  (r) = Recorded By, (t) = Taken By, (c) = Cosigned By    Initials Name Provider Type    CK Gonzalez Wilcox, PT Physical Therapist                                            Time Calculation:   Start Time: 1545  Stop Time: 1630  Time Calculation (min): 45 min    Therapy Charges for Today     Code Description Service Date Service Provider Modifiers Qty    22538726024  PT THER PROC EA 15 MIN 1/11/2018 Gonzalez Wilcox, PT GP 2    93157836374 HC PT HOT OR COLD PACK TREAT MCARE 1/11/2018 Gonzalez Wilcox, PT GP 1    39719514317 HC PT TRACTION LUMBAR 1/11/2018 Gonzalez Wilcox, PT GP 1                     Gonzalez Wilcox, PT  1/11/2018

## 2018-01-16 ENCOUNTER — APPOINTMENT (OUTPATIENT)
Dept: PHYSICAL THERAPY | Facility: HOSPITAL | Age: 68
End: 2018-01-16

## 2018-01-18 ENCOUNTER — HOSPITAL ENCOUNTER (OUTPATIENT)
Dept: PHYSICAL THERAPY | Facility: HOSPITAL | Age: 68
Setting detail: THERAPIES SERIES
Discharge: HOME | End: 2018-01-18

## 2018-01-18 DIAGNOSIS — M54.5 LEFT LOW BACK PAIN, UNSPECIFIED CHRONICITY, WITH SCIATICA PRESENCE UNSPECIFIED: Primary | ICD-10-CM

## 2018-01-18 DIAGNOSIS — M79.10 TRIGGER POINT: ICD-10-CM

## 2018-01-18 PROCEDURE — 97110 THERAPEUTIC EXERCISES: CPT | Performed by: PHYSICAL THERAPIST

## 2018-01-18 PROCEDURE — 97012 MECHANICAL TRACTION THERAPY: CPT | Performed by: PHYSICAL THERAPIST

## 2018-01-18 PROCEDURE — G8982 BODY POS GOAL STATUS: HCPCS | Performed by: PHYSICAL THERAPIST

## 2018-01-18 PROCEDURE — G8981 BODY POS CURRENT STATUS: HCPCS | Performed by: PHYSICAL THERAPIST

## 2018-01-18 NOTE — THERAPY PROGRESS REPORT/RE-CERT
Outpatient Physical Therapy Ortho Progress Note  [facility]     Patient Name: Eve Hurley  : 1950  MRN: [id number]  Today's Date: 2018      Visit Date: 2018    Patient Active Problem List   Diagnosis   • Hypothyroidism   • Arthralgia of hip   • Osteoporosis   • Hearing difficulty   • Rectal bleeding   • Diverticulitis of intestine without perforation or abscess without bleeding   • Senile osteoporosis   • Abnormal CT of the abdomen   • Buttock trauma   • Hematoma        Past Medical History:   Diagnosis Date   • Actinic keratoses    • Anemia    • Atypical chest pain 2009    SEEN AT [facility] ER   • DDD (degenerative disc disease), cervical    • Disease of thyroid gland     HYPOTHYROIDISM   • Disorder of cecum 2017    CT OF ABD AND PELVIS AT [facility] SHOWED THICKENING IN CECUM, POSSIBLE MASS   • Diverticulitis     MOST RECENTLY ON 19, HAS HAD 4-5 EPISODES IN PAST 20 YEARS.    • Fall    • Fibrocystic breast    • Hearing difficulty    • Hip joint pain    • Hypertension    • Left lower quadrant pain 2004    CT SCAN AT [facility] SHOWED ESSENTIALLY NORMAL EXCEPT 2 VERY SMALL HEPATIC CYSTS AND CYSTIC STRUCTURE ON THE LEFT SIDE OF OF THE PELVIS REPRESENTING A 2CM OVARIAN CYST   • Lower extremity pain, left 06/15/2012    VENOUS DOPLER AT [facility] WAS WNL   • Osteoporosis     GETS PROLIA INJECTIONS   • Perforated ear drum 2014    MICROPERFORATION, LEFT EAR, SAW DR. GREGORY LAGOS   • PONV (postoperative nausea and vomiting)    • Rectal bleed 03/15/2016   • Renal insufficiency 2016   • Vegetarian     VEGAN        Past Surgical History:   Procedure Laterality Date   • BREAST SURGERY Right     CYST ASPIRATION   •  SECTION N/A    • COLONOSCOPY N/A 2004    DIVERTICULOSIS, REDUNDANT SIGMOID, DR. KELLEN ROBERTS AT [place]   • COLONOSCOPY N/A 2017    Procedure: COLONOSCOPY INTO CECUM;  Surgeon: Marito uDnbar MD;  Location: [facility] ENDOSCOPY;  Service:    • HEMORRHOIDECTOMY  N/A 11/13/2009    DR. KELLEN ROBERTS AT [place]   • TUBAL ABDOMINAL LIGATION Bilateral 1986       Visit Dx:     ICD-10-CM ICD-9-CM   1. Left low back pain, unspecified chronicity, with sciatica presence unspecified M54.5 724.2   2. Trigger point M79.1 729.1                                       PT OP Goals       01/18/18 1500       PT Short Term Goals    STG Date to Achieve 01/19/18  -CK     STG 1 pt. to be I with initial HEP to facilitate self management of their condition  -CK     STG 1 Progress Met  -CK     STG 2 pt. to be educated in/verbalize understanding of the importance of posture/ergonomics in association with their condition to facilitate self management of their condition  -CK     STG 2 Progress Met  -CK     STG 3 pt to demonstrate full L knee ext in sitting to facilitate ease of performing household activities  -CK     STG 3 Progress Met  -CK     Long Term Goals    LTG Date to Achieve 02/23/18  -CK     LTG 1 pt. to be I with advanced HEP to facilitate self management of their condition  -CK     LTG 1 Progress Ongoing  -CK     LTG 1 Progress Comments progressing as appropriate   -CK     LTG 2 pt to demonstrate/report donning/doffing shoes/socks on L without exacerbation of L hip pain to facilitate ease with self care activities  -CK     LTG 2 Progress Ongoing  -CK     LTG 2 Progress Comments reports ease with this activity since starting traction  -CK     LTG 3 pt. to demonstrate >/= 75% of full AROM of L spine in all planes without pain to facilitate ease with dressing  -CK     LTG 3 Progress Partially Met  -CK       User Key  (r) = Recorded By, (t) = Taken By, (c) = Cosigned By    Initials Name Provider Type    YVON Wilcox, PT Physical Therapist                PT Assessment/Plan       01/18/18 1525       PT Assessment    Assessment Comments Mrs. Hurley has been seen for 6 skilled therapy sessions since initiating therapy for L lateral hip pain/radicular symptoms after sustaining fall from ladder.  Reports lasting relief with traction from last session. Reports ease with donning/doffing shoes/socks since last session. Pain rated at 1/10  today after walking outside with . Continued with progressive core/hip strengthening and traction. No progression in lbs of pull today. Progressed ankle weights to 3#.  Overall progressing well towards all her goals at this time. She remains appropriate for skilled therapy services at this time in order to meet all her goals and discharge to self management.   -CK     PT Plan    PT Plan Comments Assess response to traction last session, progress by 2 lbs if no issue. Continue hip/core strengthening. Consider unilateral bridge/lat pull in supine.   -CK       User Key  (r) = Recorded By, (t) = Taken By, (c) = Cosigned By    Initials Name Provider Type    YVON Wilcox, PT Physical Therapist                Modalities       01/18/18 1400          Moist Heat    MH Applied Yes   underneath during traction  -CK      Location T/L/S  -CK      Rx Minutes 15 mins  -CK      Traction 94846    Traction Type Lumbar  -CK      Rx Minutes 15 min  -CK      Duration Intermittent  -CK      Position Hook-lying  -CK      Weight --   48/25  -CK      Hold 45  -CK      Relax 15  -CK      Progression --   none today  -CK        User Key  (r) = Recorded By, (t) = Taken By, (c) = Cosigned By    Initials Name Provider Type    YVON Wilcox PT Physical Therapist              Exercises       01/18/18 1400          Subjective Comments    Subjective Comments I think we are on to something with the traction. I am having less pain with donning/doffing my shoes and socks  -CK      Subjective Pain    Able to rate subjective pain? yes  -CK      Pre-Treatment Pain Level 1  -CK      Exercise 1    Exercise Name 1 Calf stretch off edge of step  -CK      Reps 1 2  -CK      Time (Seconds) 1 30  -CK      Exercise 2    Exercise Name 2 HS stretch on step  -CK      Cueing 2 Verbal  -CK      Reps 2 3  -CK       Time (Seconds) 2 20  -CK      Exercise 3    Exercise Name 3 PPT  -CK      Reps 3 10  -CK      Time (Seconds) 3 10  -CK      Exercise 4    Exercise Name 4 Piriformis stretch-B  -CK      Reps 4 2  -CK      Time (Seconds) 4 30  -CK      Additional Comments Reach through  -CK      Exercise 5    Exercise Name 5 LAQ with TA  -CK      Reps 5 10  -CK      Time (Seconds) 5 5  -CK      Additional Comments #3  -CK      Exercise 6    Exercise Name 6 Bridges, BLE on ball  -CK      Reps 6 15  -CK      Time (Seconds) 6 5  -CK      Exercise 7    Exercise Name 7 shoulder ext, SLS, bilatealliy   -CK      Cueing 7 Demo  -CK      Sets 7 3  -CK      Reps 7 10  -CK      Additional Comments FT then SLS, BTB  -CK      Exercise 8    Exercise Name 8 nustep, UE/LE  -CK      Cueing 8 Verbal  -CK      Time (Minutes) 8 5  -CK      Additional Comments L5  -CK      Exercise 9    Exercise Name 9 SL hip abd, bilaterally  -CK      Cueing 9 Verbal  -CK      Reps 9 15  -CK      Additional Comments #3 with TA  -CK      Exercise 10    Exercise Name 10 prone supermans with TA  -CK      Cueing 10 Verbal  -CK      Reps 10 15  -CK      Exercise 11    Exercise Name 11 HS curls with TA  -CK      Reps 11 15  -CK      Additional Comments #3  -CK        User Key  (r) = Recorded By, (t) = Taken By, (c) = Cosigned By    Initials Name Provider Type    CK Gonzalez S Brenden, PT Physical Therapist                        Outcome Measure Options: Modifed Owestry  Modified Oswestry  Modified Oswestry Score/Comments: 12%      Time Calculation:   Start Time: 1415  Stop Time: 1500  Time Calculation (min): 45 min     Therapy Charges for Today     Code Description Service Date Service Provider Modifiers Qty    07272088321 HC PT CHNG MAIN POS CURRENT 1/18/2018 Gonzalez S Brenden, PT GP, CI 1    45586759925 HC PT CHNG MAIN POS PROJECTED 1/18/2018 Gonzalez S Brenden, PT GP, CI 1    84694530997 HC PT THER PROC EA 15 MIN 1/18/2018 Gonzalez S Brenden, PT GP 2    70511231715 HC PT TRACTION LUMBAR  1/18/2018 Gonzalez Wilcox, PT GP 1    50266619676 HC PT HOT OR COLD PACK TREAT MCARE 1/18/2018 Gonzalez Wilcox, PT GP 1          PT G-Codes  Outcome Measure Options: Modleod Owestry  Score: 12%  Functional Limitation: Changing and maintaining body position  Changing and Maintaining Body Position Current Status (): At least 1 percent but less than 20 percent impaired, limited or restricted  Changing and Maintaining Body Position Goal Status (): At least 1 percent but less than 20 percent impaired, limited or restricted         Gonzalez Wilcox, PT  1/18/2018

## 2018-01-23 ENCOUNTER — HOSPITAL ENCOUNTER (OUTPATIENT)
Dept: PHYSICAL THERAPY | Facility: HOSPITAL | Age: 68
Setting detail: THERAPIES SERIES
Discharge: HOME | End: 2018-01-23

## 2018-01-23 DIAGNOSIS — M54.5 LEFT LOW BACK PAIN, UNSPECIFIED CHRONICITY, WITH SCIATICA PRESENCE UNSPECIFIED: Primary | ICD-10-CM

## 2018-01-23 DIAGNOSIS — M79.10 TRIGGER POINT: ICD-10-CM

## 2018-01-23 PROCEDURE — 97012 MECHANICAL TRACTION THERAPY: CPT | Performed by: PHYSICAL THERAPIST

## 2018-01-23 PROCEDURE — 97110 THERAPEUTIC EXERCISES: CPT | Performed by: PHYSICAL THERAPIST

## 2018-01-23 NOTE — THERAPY TREATMENT NOTE
Outpatient Physical Therapy Ortho Treatment Note  [facility]     Patient Name: Eve Hurley  : 1950  MRN: [id number]  Today's Date: 2018      Visit Date: 2018    Visit Dx:    ICD-10-CM ICD-9-CM   1. Left low back pain, unspecified chronicity, with sciatica presence unspecified M54.5 724.2   2. Trigger point M79.1 729.1       Patient Active Problem List   Diagnosis   • Hypothyroidism   • Arthralgia of hip   • Osteoporosis   • Hearing difficulty   • Rectal bleeding   • Diverticulitis of intestine without perforation or abscess without bleeding   • Senile osteoporosis   • Abnormal CT of the abdomen   • Buttock trauma   • Hematoma        Past Medical History:   Diagnosis Date   • Actinic keratoses    • Anemia    • Atypical chest pain 2009    SEEN AT [facility] ER   • DDD (degenerative disc disease), cervical    • Disease of thyroid gland     HYPOTHYROIDISM   • Disorder of cecum 2017    CT OF ABD AND PELVIS AT [facility] SHOWED THICKENING IN CECUM, POSSIBLE MASS   • Diverticulitis     MOST RECENTLY ON 19, HAS HAD 4-5 EPISODES IN PAST 20 YEARS.    • Fall    • Fibrocystic breast    • Hearing difficulty    • Hip joint pain    • Hypertension    • Left lower quadrant pain 2004    CT SCAN AT [facility] SHOWED ESSENTIALLY NORMAL EXCEPT 2 VERY SMALL HEPATIC CYSTS AND CYSTIC STRUCTURE ON THE LEFT SIDE OF OF THE PELVIS REPRESENTING A 2CM OVARIAN CYST   • Lower extremity pain, left 06/15/2012    VENOUS DOPLER AT [facility] WAS WNL   • Osteoporosis     GETS PROLIA INJECTIONS   • Perforated ear drum 2014    MICROPERFORATION, LEFT EAR, SAW DR. GREGORY LAGOS   • PONV (postoperative nausea and vomiting)    • Rectal bleed 03/15/2016   • Renal insufficiency 2016   • Vegetarian     VEGAN        Past Surgical History:   Procedure Laterality Date   • BREAST SURGERY Right     CYST ASPIRATION   •  SECTION N/A    • COLONOSCOPY N/A 2004    DIVERTICULOSIS, REDUNDANT SIGMOID, DR. KELLEN ROBERTS AT  "[place]   • COLONOSCOPY N/A 8/30/2017    Procedure: COLONOSCOPY INTO CECUM;  Surgeon: Marito Dunbar MD;  Location: [facility] ENDOSCOPY;  Service:    • HEMORRHOIDECTOMY N/A 11/13/2009    DR. KELLEN ROBERTS AT [place]   • TUBAL ABDOMINAL LIGATION Bilateral 1986                             PT Assessment/Plan       01/23/18 9707       PT Assessment    Assessment Comments Reports of intermittent LE radicular symptoms after last session but also reports volunteering activities in which she \"might have over done it\" with the bending, stooping, etc. Continued with strengthening program. Added sidelying clamshells, lat pulls, and QL stretching. Also progressed lbs of pull on traction by 2 lbs. No immediate adverse effects. Reminded her of potential \"rebound effects\" for 24 hours after traction.   -CK     PT Plan    PT Plan Comments Conside addition of mini squats on bosu and unilateral bridge/shoulder ext in supine.   -CK       User Key  (r) = Recorded By, (t) = Taken By, (c) = Cosigned By    Initials Name Provider Type    CK Gonzalez Wilcox, PT Physical Therapist                Modalities       01/23/18 1400          Moist Heat    MH Applied Yes   underneath during traction  -CK      Location T/L/S  -CK      Rx Minutes 15 mins  -CK      Traction 88323    Traction Type Lumbar  -CK      Rx Minutes 15 min  -CK      Duration Intermittent  -CK      Position Hook-lying  -CK      Weight --   50/25  -CK      Hold 45  -CK      Relax 15  -CK      Progression --   2 lbs  -CK        User Key  (r) = Recorded By, (t) = Taken By, (c) = Cosigned By    Initials Name Provider Type    CK Gonzalez Wilcox, PT Physical Therapist                Exercises       01/23/18 1400          Subjective Comments    Subjective Comments I had some intermittent leg pain after last session for about 24 hours after. Its gone now. Getting my shoes/socks on is better.  -CK      Subjective Pain    Able to rate subjective pain? yes  -CK      Pre-Treatment Pain Level 1  -CK "      Exercise 1    Exercise Name 1 Calf stretch off edge of step  -CK      Reps 1 2  -CK      Time (Seconds) 1 30  -CK      Exercise 2    Exercise Name 2 HS stretch on step  -CK      Cueing 2 Verbal  -CK      Reps 2 2  -CK      Time (Seconds) 2 30  -CK      Exercise 3    Exercise Name 3 PPT/Marching  -CK      Reps 3 10  -CK      Time (Seconds) 3 10  -CK      Additional Comments PPT first and hold while we march  -CK      Exercise 4    Exercise Name 4 Piriformis stretch-B  -CK      Reps 4 2  -CK      Time (Seconds) 4 30  -CK      Additional Comments reach through  -CK      Exercise 5    Exercise Name 5 LAQ with TA  -CK      Reps 5 15  -CK      Time (Seconds) 5 5  -CK      Additional Comments #3  -CK      Exercise 6    Exercise Name 6 Bridges, BLE on ball  -CK      Reps 6 10  -CK      Time (Seconds) 6 10  -CK      Additional Comments 3 sec pulse at top at end  -CK      Exercise 7    Exercise Name 7 Lat pulls/SLS  -CK      Sets 7 2  -CK      Reps 7 15  -CK      Additional Comments BTB  -CK      Exercise 8    Exercise Name 8 nustep, UE/LE  -CK      Cueing 8 Verbal  -CK      Time (Minutes) 8 5  -CK      Exercise 9    Exercise Name 9 SL hip abd, bilaterally  -CK      Cueing 9 Verbal  -CK      Reps 9 15  -CK      Additional Comments #3 with TA  -CK      Exercise 10    Exercise Name 10 prone supermans with TA  -CK      Cueing 10 Verbal  -CK      Reps 10 15  -CK      Exercise 11    Exercise Name 11 HS curls with TA  -CK      Reps 11 20  -CK      Additional Comments #3  -CK      Exercise 12    Exercise Name 12 clamshells  -CK      Cueing 12 Demo  -CK      Reps 12 20  -CK      Additional Comments raising/lowering through honey  -CK      Exercise 13    Exercise Name 13 Kneeling QL stretch  -CK      Reps 13 2  -CK      Time (Seconds) 13 20  -CK        User Key  (r) = Recorded By, (t) = Taken By, (c) = Cosigned By    Initials Name Provider Type    CK Gonzalez Wilcox, PT Physical Therapist                             Therapy  Education  Given: HEP  Program: Progressed  How Provided: Verbal, Demonstration, Written  Provided to: Patient  Level of Understanding: Teach back education performed, Verbalized              Time Calculation:   Start Time: 1400  Stop Time: 1500  Time Calculation (min): 60 min    Therapy Charges for Today     Code Description Service Date Service Provider Modifiers Qty    47458962295  PT THER PROC EA 15 MIN 1/23/2018 Gonzalez Wilcox, PT GP 2    82876232703 HC PT HOT OR COLD PACK TREAT MCARE 1/23/2018 Gonzalez Wilcox, PT GP 1    [id number]  PT TRACTION LUMBAR 1/23/2018 Gonzalez Wilcox, PT GP 1                    Gonzalez Wilcox, PT  1/23/2018

## 2018-01-25 ENCOUNTER — HOSPITAL ENCOUNTER (OUTPATIENT)
Dept: PHYSICAL THERAPY | Facility: HOSPITAL | Age: 68
Setting detail: THERAPIES SERIES
Discharge: HOME | End: 2018-01-25

## 2018-01-25 DIAGNOSIS — M79.10 TRIGGER POINT: ICD-10-CM

## 2018-01-25 DIAGNOSIS — M54.5 LEFT LOW BACK PAIN, UNSPECIFIED CHRONICITY, WITH SCIATICA PRESENCE UNSPECIFIED: Primary | ICD-10-CM

## 2018-01-25 PROCEDURE — 97110 THERAPEUTIC EXERCISES: CPT | Performed by: PHYSICAL THERAPIST

## 2018-01-25 PROCEDURE — 97012 MECHANICAL TRACTION THERAPY: CPT | Performed by: PHYSICAL THERAPIST

## 2018-01-25 NOTE — THERAPY TREATMENT NOTE
Outpatient Physical Therapy Ortho Treatment Note  [facility]     Patient Name: Eve Hurley  : 1950  MRN: [id number]  Today's Date: 2018      Visit Date: 2018    Visit Dx:    ICD-10-CM ICD-9-CM   1. Left low back pain, unspecified chronicity, with sciatica presence unspecified M54.5 724.2   2. Trigger point M79.1 729.1       Patient Active Problem List   Diagnosis   • Hypothyroidism   • Arthralgia of hip   • Osteoporosis   • Hearing difficulty   • Rectal bleeding   • Diverticulitis of intestine without perforation or abscess without bleeding   • Senile osteoporosis   • Abnormal CT of the abdomen   • Buttock trauma   • Hematoma        Past Medical History:   Diagnosis Date   • Actinic keratoses    • Anemia    • Atypical chest pain 2009    SEEN AT [facility] ER   • DDD (degenerative disc disease), cervical    • Disease of thyroid gland     HYPOTHYROIDISM   • Disorder of cecum 2017    CT OF ABD AND PELVIS AT [facility] SHOWED THICKENING IN CECUM, POSSIBLE MASS   • Diverticulitis     MOST RECENTLY ON 19, HAS HAD 4-5 EPISODES IN PAST 20 YEARS.    • Fall    • Fibrocystic breast    • Hearing difficulty    • Hip joint pain    • Hypertension    • Left lower quadrant pain 2004    CT SCAN AT [facility] SHOWED ESSENTIALLY NORMAL EXCEPT 2 VERY SMALL HEPATIC CYSTS AND CYSTIC STRUCTURE ON THE LEFT SIDE OF OF THE PELVIS REPRESENTING A 2CM OVARIAN CYST   • Lower extremity pain, left 06/15/2012    VENOUS DOPLER AT [facility] WAS WNL   • Osteoporosis     GETS PROLIA INJECTIONS   • Perforated ear drum 2014    MICROPERFORATION, LEFT EAR, SAW DR. GREGORY LAGOS   • PONV (postoperative nausea and vomiting)    • Rectal bleed 03/15/2016   • Renal insufficiency 2016   • Vegetarian     VEGAN        Past Surgical History:   Procedure Laterality Date   • BREAST SURGERY Right     CYST ASPIRATION   •  SECTION N/A    • COLONOSCOPY N/A 2004    DIVERTICULOSIS, REDUNDANT SIGMOID, DR. KELLEN ROBERTS AT  [place]   • COLONOSCOPY N/A 8/30/2017    Procedure: COLONOSCOPY INTO CECUM;  Surgeon: Marito Dunbar MD;  Location: [facility] ENDOSCOPY;  Service:    • HEMORRHOIDECTOMY N/A 11/13/2009    DR. KELLEN ROBERTS AT [place]   • TUBAL ABDOMINAL LIGATION Bilateral 1986                             PT Assessment/Plan       01/25/18 1504       PT Assessment    Assessment Comments Overall improvements in low back pain/hip pain/radicular symptoms. Plan to shift to 1x/week to see if patient can manage her symptoms independently. If so, plan to discharge in 2-3 sessions.   -CK     PT Plan    PT Plan Comments Consider addition of mini squats on BOSU next session for gluteal strength and help with body mechanics.  -CK       User Key  (r) = Recorded By, (t) = Taken By, (c) = Cosigned By    Initials Name Provider Type    CK Gonzalez Wilcox, PT Physical Therapist                Modalities       01/25/18 1400          Moist Heat    MH Applied Yes  -CK      Location T/L/S  -CK      Rx Minutes 15 mins  -CK      Traction 34725    Traction Type Lumbar  -CK      Rx Minutes 15 min  -CK      Duration Intermittent  -CK      Position Hook-lying  -CK      Weight --   50/25  -CK      Hold 45  -CK      Relax 15  -CK      Progression --   2 lbs  -CK        User Key  (r) = Recorded By, (t) = Taken By, (c) = Cosigned By    Initials Name Provider Type    CK Gonzalez Wilcox, PT Physical Therapist                Exercises       01/25/18 1400          Subjective Comments    Subjective Comments I did great after last time.   -CK      Subjective Pain    Able to rate subjective pain? yes  -CK      Pre-Treatment Pain Level 1  -CK      Exercise 1    Exercise Name 1 Calf stretch off edge of step  -CK      Reps 1 2  -CK      Time (Seconds) 1 30  -CK      Exercise 2    Exercise Name 2 HS stretch on step  -CK      Cueing 2 Verbal  -CK      Reps 2 2  -CK      Time (Seconds) 2 30  -CK      Exercise 3    Exercise Name 3 PPT/Marching  -CK      Reps 3 5  -CK      Time (Seconds)  "3 10  -CK      Additional Comments PPT first then march  -CK      Exercise 4    Exercise Name 4 Piriformis stretch-B  -CK      Reps 4 2  -CK      Time (Seconds) 4 30  -CK      Additional Comments reach through  -CK      Exercise 6    Exercise Name 6 Bridges, BLE on ball  -CK      Reps 6 10  -CK      Time (Seconds) 6 10  -CK      Additional Comments 3 sec pulse at top  -CK      Exercise 7    Exercise Name 7 Lat pulls/SLS  -CK      Sets 7 2  -CK      Reps 7 15  -CK      Additional Comments BTB  -CK      Exercise 8    Exercise Name 8 nustep, UE/LE  -CK      Cueing 8 Verbal  -CK      Time (Minutes) 8 5  -CK      Additional Comments L5  -CK      Exercise 9    Exercise Name 9 SL hip abd, bilaterally  -CK      Cueing 9 Verbal  -CK      Sets 9 2  -CK      Reps 9 10  -CK      Additional Comments #3 with TA  -CK      Exercise 10    Exercise Name 10 Quadruped bird dogs  -CK      Cueing 10 Demo  -CK      Reps 10 10  -CK      Additional Comments Vcs for TA/PPT  -CK      Exercise 11    Exercise Name 11 HS curls with TA  -CK      Reps 11 20  -CK      Exercise 12    Exercise Name 12 clamshells  -CK      Cueing 12 Demo  -CK      Reps 12 20  -CK      Additional Comments \"through honey\"  -CK      Exercise 13    Exercise Name 13 Kneeling QL stretch  -CK      Reps 13 2  -CK      Time (Seconds) 13 20  -CK        User Key  (r) = Recorded By, (t) = Taken By, (c) = Cosigned By    Initials Name Provider Type    CK Gonzalez S Brenden, PT Physical Therapist                               PT OP Goals       01/25/18 1500       PT Short Term Goals    STG Date to Achieve 01/19/18  -CK     STG 1 pt. to be I with initial HEP to facilitate self management of their condition  -CK     STG 1 Progress Met  -CK     STG 2 pt. to be educated in/verbalize understanding of the importance of posture/ergonomics in association with their condition to facilitate self management of their condition  -CK     STG 2 Progress Met  -CK     STG 3 pt to demonstrate full L knee " ext in sitting to facilitate ease of performing household activities  -CK     STG 3 Progress Met  -CK     Long Term Goals    LTG Date to Achieve 02/23/18  -CK     LTG 1 pt. to be I with advanced HEP to facilitate self management of their condition  -CK     LTG 1 Progress Partially Met  -CK     LTG 2 pt to demonstrate/report donning/doffing shoes/socks on L without exacerbation of L hip pain to facilitate ease with self care activities  -CK     LTG 2 Progress Met  -CK     LTG 3 pt. to demonstrate >/= 75% of full AROM of L spine in all planes without pain to facilitate ease with dressing  -CK     LTG 3 Progress Partially Met  -CK       User Key  (r) = Recorded By, (t) = Taken By, (c) = Cosigned By    Initials Name Provider Type    YVON Wilcox PT Physical Therapist                         Time Calculation:   Start Time: 1415  Stop Time: 1500  Time Calculation (min): 45 min    Therapy Charges for Today     Code Description Service Date Service Provider Modifiers Qty    87040254262 HC PT THER PROC EA 15 MIN 1/25/2018 Gonzalez Wilcox PT GP 2    49652489919 HC PT TRACTION LUMBAR 1/25/2018 Gonzalez Wilcox PT GP 1    55497324732 HC PT HOT OR COLD PACK TREAT MCARE 1/25/2018 Gonzalez Wilcox PT GP 1                    Gonzalez Wilcox PT  1/25/2018

## 2018-01-26 ENCOUNTER — OFFICE VISIT (OUTPATIENT)
Dept: INTERNAL MEDICINE | Facility: CLINIC | Age: 68
End: 2018-01-26

## 2018-01-26 VITALS
BODY MASS INDEX: 19.66 KG/M2 | WEIGHT: 111 LBS | DIASTOLIC BLOOD PRESSURE: 74 MMHG | HEART RATE: 96 BPM | SYSTOLIC BLOOD PRESSURE: 110 MMHG | OXYGEN SATURATION: 96 %

## 2018-01-26 DIAGNOSIS — M54.32 SCIATICA OF LEFT SIDE: Primary | ICD-10-CM

## 2018-01-26 PROCEDURE — 99213 OFFICE O/P EST LOW 20 MIN: CPT | Performed by: INTERNAL MEDICINE

## 2018-01-26 NOTE — PROGRESS NOTES
Chief Complaint   Patient presents with   • Sciatica     better is in PT       History of Present Illness   Eve Hurley is a 67 y.o. female presents for follow up evaluation. She is doing well today. She was having an acute back pain w/ sciatic radiation. She reports that in general it has improved. She is having flair on occasion but limited to a day at a time when she gets this. She is in physical therapy w/ good benefit. Now back is not hurting just sciatic distribution pain on occasion. Worse w/ forward flexion. Patient is taking neurontin in a prn fashion now for days w/ increased activity or after a flair. She is doing home exercises and is in yoga w/ good ability.       The following portions of the patient's history were reviewed and updated as appropriate: allergies, current medications, past family history, past medical history, past social history, past surgical history and problem list.  Current Outpatient Prescriptions on File Prior to Visit   Medication Sig Dispense Refill   • acetaminophen (TYLENOL) 500 MG tablet Take 1,000 mg by mouth every day.     • calcium carbonate EX (TUMS EX) 750 MG chewable tablet Chew 750 mg 4 (four) times a day.     • Calcium Carbonate-Vit D-Min (CALTRATE 600+D PLUS MINERALS PO) Take 1,600 Units by mouth daily.     • Cholecalciferol (VITAMIN D3) 2000 UNITS capsule Take  by mouth.     • denosumab (PROLIA) 60 MG/ML solution syringe Inject 60 mg under the skin every 6 (six) months.     • Flaxseed, Linseed, (FLAXSEED OIL) 1000 MG capsule Take  by mouth.     • gabapentin (NEURONTIN) 100 MG capsule Take 1 capsule by mouth 3 (Three) Times a Day. 90 capsule 3   • levothyroxine (SYNTHROID, LEVOTHROID) 75 MCG tablet Take  by mouth.     • Multiple Vitamin (MULTIVITAMINS PO) Take  by mouth.     • polycarbophil (FIBERCON) 625 MG tablet Take 625 mg by mouth daily.     • Probiotic Product (PROBIOTIC DAILY PO)      • vitamin B-12 (CYANOCOBALAMIN) 1000 MCG tablet Take  by mouth.     •  [DISCONTINUED] MethylPREDNISolone (MEDROL, ELAINE,) 4 MG tablet Take as directed on package instructions. 21 each 0     No current facility-administered medications on file prior to visit.      Review of Systems   Constitutional: Negative.    HENT: Negative.    Eyes: Negative.    Respiratory: Negative.    Cardiovascular: Negative.    Gastrointestinal: Negative.    Endocrine: Negative.    Genitourinary: Negative.    Musculoskeletal: Positive for back pain.   Skin: Negative.    Allergic/Immunologic: Negative.    Neurological: Negative.    Hematological: Negative.    Psychiatric/Behavioral: Negative.        Objective   Physical Exam   Constitutional: She is oriented to person, place, and time. She appears well-developed and well-nourished.   HENT:   Head: Normocephalic and atraumatic.   Right Ear: External ear normal.   Left Ear: External ear normal.   Nose: Nose normal.   Mouth/Throat: Oropharynx is clear and moist.   Eyes: EOM are normal. Pupils are equal, round, and reactive to light.   Neck: Normal range of motion. Neck supple.   Cardiovascular: Normal rate, regular rhythm and normal heart sounds.    Pulmonary/Chest: Effort normal and breath sounds normal. No respiratory distress.   Abdominal: Soft. Bowel sounds are normal.   Musculoskeletal: Normal range of motion.   Able to fully flex forward but mild discomfort when she reaches the floor.    Neurological: She is alert and oriented to person, place, and time.   Skin: Skin is warm and dry.   Psychiatric: She has a normal mood and affect. Her behavior is normal. Judgment and thought content normal.   Nursing note and vitals reviewed.       /74  Pulse 96  Wt 50.3 kg (111 lb)  LMP  (LMP Unknown)  SpO2 96%  BMI 19.66 kg/m2    Assessment/Plan   Diagnoses and all orders for this visit:    Sciatica of left side      Patient w/ left side sciatica. She is gradually improving and doing well. She will continue neurontin as needed. Also may use tylenol if needed.  Stretching exercises.

## 2018-02-01 ENCOUNTER — HOSPITAL ENCOUNTER (OUTPATIENT)
Dept: PHYSICAL THERAPY | Facility: HOSPITAL | Age: 68
Setting detail: THERAPIES SERIES
Discharge: HOME | End: 2018-02-01

## 2018-02-01 DIAGNOSIS — M79.10 TRIGGER POINT: ICD-10-CM

## 2018-02-01 DIAGNOSIS — M54.5 LEFT LOW BACK PAIN, UNSPECIFIED CHRONICITY, WITH SCIATICA PRESENCE UNSPECIFIED: Primary | ICD-10-CM

## 2018-02-01 PROCEDURE — 97012 MECHANICAL TRACTION THERAPY: CPT | Performed by: PHYSICAL THERAPIST

## 2018-02-01 PROCEDURE — 97110 THERAPEUTIC EXERCISES: CPT | Performed by: PHYSICAL THERAPIST

## 2018-02-01 NOTE — THERAPY TREATMENT NOTE
Outpatient Physical Therapy Ortho Treatment Note  [facility]     Patient Name: Eve Hurley  : 1950  MRN: [id number]  Today's Date: 2018      Visit Date: 2018    Visit Dx:    ICD-10-CM ICD-9-CM   1. Left low back pain, unspecified chronicity, with sciatica presence unspecified M54.5 724.2   2. Trigger point M79.1 729.1       Patient Active Problem List   Diagnosis   • Hypothyroidism   • Arthralgia of hip   • Osteoporosis   • Hearing difficulty   • Rectal bleeding   • Diverticulitis of intestine without perforation or abscess without bleeding   • Senile osteoporosis   • Abnormal CT of the abdomen   • Buttock trauma   • Hematoma        Past Medical History:   Diagnosis Date   • Actinic keratoses    • Anemia    • Atypical chest pain 2009    SEEN AT [facility] ER   • DDD (degenerative disc disease), cervical    • Disease of thyroid gland     HYPOTHYROIDISM   • Disorder of cecum 2017    CT OF ABD AND PELVIS AT [facility] SHOWED THICKENING IN CECUM, POSSIBLE MASS   • Diverticulitis     MOST RECENTLY ON 19, HAS HAD 4-5 EPISODES IN PAST 20 YEARS.    • Fall    • Fibrocystic breast    • Hearing difficulty    • Hip joint pain    • Hypertension    • Left lower quadrant pain 2004    CT SCAN AT [facility] SHOWED ESSENTIALLY NORMAL EXCEPT 2 VERY SMALL HEPATIC CYSTS AND CYSTIC STRUCTURE ON THE LEFT SIDE OF OF THE PELVIS REPRESENTING A 2CM OVARIAN CYST   • Lower extremity pain, left 06/15/2012    VENOUS DOPLER AT [facility] WAS WNL   • Osteoporosis     GETS PROLIA INJECTIONS   • Perforated ear drum 2014    MICROPERFORATION, LEFT EAR, SAW DR. GREGORY LAGOS   • PONV (postoperative nausea and vomiting)    • Rectal bleed 03/15/2016   • Renal insufficiency 2016   • Vegetarian     VEGAN        Past Surgical History:   Procedure Laterality Date   • BREAST SURGERY Right     CYST ASPIRATION   •  SECTION N/A    • COLONOSCOPY N/A 2004    DIVERTICULOSIS, REDUNDANT SIGMOID, DR. KELLEN ROBERTS AT  [place]   • COLONOSCOPY N/A 8/30/2017    Procedure: COLONOSCOPY INTO CECUM;  Surgeon: Marito Dunbar MD;  Location: [facility] ENDOSCOPY;  Service:    • HEMORRHOIDECTOMY N/A 11/13/2009    DR. KELLEN ROBERTS AT [place]   • TUBAL ABDOMINAL LIGATION Bilateral 1986                             PT Assessment/Plan       02/01/18 1658       PT Assessment    Assessment Comments Continued with core/hip strengthening exercises. Added mini squats on BOSU for balance challenge. Also progressed to #4 lb ankle weights for LE strengthening. Continued with traction- no progression. Plan to move patient to 1x/week for 3-4 weeks to ensure she can manage her program without regression in LE radicular symptoms. She is agreeable to plan.   -CK     PT Plan    PT Plan Comments Move to 1x/week. Prepare for d/c as long as no regression in symptoms in coming weeks.   -CK       User Key  (r) = Recorded By, (t) = Taken By, (c) = Cosigned By    Initials Name Provider Type    CK Gonzalez Wilcox, PT Physical Therapist                Modalities       02/01/18 1400          Moist Heat    MH Applied Yes  -CK      Location T/L/S  -CK      Rx Minutes 15 mins  -CK      Traction 92168    Traction Type Lumbar  -CK      Rx Minutes 15 min  -CK      Duration Intermittent  -CK      Position Hook-lying  -CK      Weight --   50/25  -CK      Hold 45  -CK      Relax 15  -CK      Progression --   no progression today  -CK        User Key  (r) = Recorded By, (t) = Taken By, (c) = Cosigned By    Initials Name Provider Type    CK Gonzalez Wilcox, PT Physical Therapist                Exercises       02/01/18 1400          Subjective Comments    Subjective Comments I have good days mostly but then I will have a day when I can feel it. yesterday was one of those days. Today I am fine.  -CK      Subjective Pain    Able to rate subjective pain? yes  -CK      Pre-Treatment Pain Level 1  -CK      Post-Treatment Pain Level 1  -CK      Exercise 1    Exercise Name 1 Calf stretch off  edge of step  -CK      Reps 1 2  -CK      Time (Seconds) 1 30  -CK      Exercise 2    Exercise Name 2 HS stretch on step  -CK      Cueing 2 Verbal  -CK      Reps 2 3  -CK      Time (Seconds) 2 30  -CK      Exercise 3    Exercise Name 3 Marching/TA, sitting on blue therapy ball  -CK      Reps 3 3  -CK      Time (Seconds) 3 20  -CK      Additional Comments PPT first, hands crossed over chest  -CK      Exercise 4    Exercise Name 4 Piriformis stretch-B  -CK      Reps 4 2  -CK      Time (Seconds) 4 30  -CK      Additional Comments reach through and push away  -CK      Exercise 5    Exercise Name 5 LAQ with TA  -CK      Reps 5 10  -CK      Time (Seconds) 5 10  -CK      Additional Comments #4  -CK      Exercise 6    Exercise Name 6 Bridges, BLE on ball  -CK      Reps 6 5  -CK      Time (Seconds) 6 15  -CK      Exercise 7    Exercise Name 7 Lat pulls/SLS  -CK      Sets 7 2  -CK      Reps 7 15  -CK      Exercise 8    Exercise Name 8 nustep, UE/LE  -CK      Cueing 8 Verbal  -CK      Time (Minutes) 8 5  -CK      Additional Comments L5  -CK      Exercise 9    Exercise Name 9 SL hip abd, bilaterally  -CK      Cueing 9 Verbal  -CK      Reps 9 15  -CK      Additional Comments #4 with TA  -CK      Exercise 10    Exercise Name 10 Quadruped bird dogs  -CK      Cueing 10 Demo  -CK      Reps 10 10  -CK      Time (Seconds) 10 3  -CK      Additional Comments PPT first  -CK      Exercise 11    Exercise Name 11 HS curls with TA  -CK      Reps 11 20  -CK      Additional Comments #4  -CK      Exercise 12    Exercise Name 12 clamshells  -CK      Cueing 12 Demo  -CK      Reps 12 20  -CK      Exercise 13    Exercise Name 13 Kneeling QL stretch  -CK      Reps 13 1  -CK      Time (Seconds) 13 30  -CK      Exercise 14    Exercise Name 14 Single limb bridge  -CK      Reps 14 5  -CK      Additional Comments performed bilaterally  -CK      Exercise 15    Exercise Name 15 Mini squats on BOSU  -CK      Cueing 15 Demo  -CK      Reps 15 10  -CK       Additional Comments gluteal squeeze at top  -CK        User Key  (r) = Recorded By, (t) = Taken By, (c) = Cosigned By    Initials Name Provider Type    CK Gonzalez Wilcox, PT Physical Therapist                             Therapy Education  Given: HEP  Program: Progressed  How Provided: Verbal, Demonstration  Provided to: Patient  Level of Understanding: Verbalized, Teach back education performed              Time Calculation:   Start Time: 1415  Stop Time: 1515  Time Calculation (min): 60 min    Therapy Charges for Today     Code Description Service Date Service Provider Modifiers Qty    68363551114 HC PT THER PROC EA 15 MIN 2/1/2018 Gonzalez Wilcox, PT GP 3    61247323743 HC PT HOT OR COLD PACK TREAT MCARE 2/1/2018 Gonzalez Wilcox, PT GP 1    31587020836 HC PT TRACTION LUMBAR 2/1/2018 Gonzalez Wilcox, PT GP 1                    Gonzalez Wilcox, PT  2/1/2018

## 2018-02-06 ENCOUNTER — HOSPITAL ENCOUNTER (OUTPATIENT)
Dept: PHYSICAL THERAPY | Facility: HOSPITAL | Age: 68
Setting detail: THERAPIES SERIES
Discharge: HOME | End: 2018-02-06

## 2018-02-06 DIAGNOSIS — M54.5 LEFT LOW BACK PAIN, UNSPECIFIED CHRONICITY, WITH SCIATICA PRESENCE UNSPECIFIED: Primary | ICD-10-CM

## 2018-02-06 DIAGNOSIS — M79.10 TRIGGER POINT: ICD-10-CM

## 2018-02-06 PROCEDURE — 97012 MECHANICAL TRACTION THERAPY: CPT | Performed by: PHYSICAL THERAPIST

## 2018-02-06 PROCEDURE — 97110 THERAPEUTIC EXERCISES: CPT | Performed by: PHYSICAL THERAPIST

## 2018-02-06 NOTE — THERAPY TREATMENT NOTE
Outpatient Physical Therapy Ortho Treatment Note  [facility]     Patient Name: Eve Hurley  : 1950  MRN: [id number]  Today's Date: 2018      Visit Date: 2018    Visit Dx:    ICD-10-CM ICD-9-CM   1. Left low back pain, unspecified chronicity, with sciatica presence unspecified M54.5 724.2   2. Trigger point M79.1 729.1       Patient Active Problem List   Diagnosis   • Hypothyroidism   • Arthralgia of hip   • Osteoporosis   • Hearing difficulty   • Rectal bleeding   • Diverticulitis of intestine without perforation or abscess without bleeding   • Senile osteoporosis   • Abnormal CT of the abdomen   • Buttock trauma   • Hematoma        Past Medical History:   Diagnosis Date   • Actinic keratoses    • Anemia    • Atypical chest pain 2009    SEEN AT [facility] ER   • DDD (degenerative disc disease), cervical    • Disease of thyroid gland     HYPOTHYROIDISM   • Disorder of cecum 2017    CT OF ABD AND PELVIS AT [facility] SHOWED THICKENING IN CECUM, POSSIBLE MASS   • Diverticulitis     MOST RECENTLY ON 19, HAS HAD 4-5 EPISODES IN PAST 20 YEARS.    • Fall    • Fibrocystic breast    • Hearing difficulty    • Hip joint pain    • Hypertension    • Left lower quadrant pain 2004    CT SCAN AT [facility] SHOWED ESSENTIALLY NORMAL EXCEPT 2 VERY SMALL HEPATIC CYSTS AND CYSTIC STRUCTURE ON THE LEFT SIDE OF OF THE PELVIS REPRESENTING A 2CM OVARIAN CYST   • Lower extremity pain, left 06/15/2012    VENOUS DOPLER AT [facility] WAS WNL   • Osteoporosis     GETS PROLIA INJECTIONS   • Perforated ear drum 2014    MICROPERFORATION, LEFT EAR, SAW DR. GREGORY LAGOS   • PONV (postoperative nausea and vomiting)    • Rectal bleed 03/15/2016   • Renal insufficiency 2016   • Vegetarian     VEGAN        Past Surgical History:   Procedure Laterality Date   • BREAST SURGERY Right     CYST ASPIRATION   •  SECTION N/A    • COLONOSCOPY N/A 2004    DIVERTICULOSIS, REDUNDANT SIGMOID, DR. KELLEN ROBERTS AT  [place]   • COLONOSCOPY N/A 8/30/2017    Procedure: COLONOSCOPY INTO CECUM;  Surgeon: Marito Dunbar MD;  Location: [facility] ENDOSCOPY;  Service:    • HEMORRHOIDECTOMY N/A 11/13/2009    DR. KELLEN ROBERTS AT [place]   • TUBAL ABDOMINAL LIGATION Bilateral 1986                             PT Assessment/Plan       02/06/18 1443       PT Assessment    Assessment Comments Ms. Hurley returns for her first session since going to once a week.  She verbalizes a good understanding of her condition and HEP, requiring only intermittent cuing. We continued L spine traction, no changes.  Will continue once a week for 3-4 additional weeks to ensure good transition to independent management.   -GJ     PT Plan    PT Plan Comments assess response to only being in therapy once a week, ensure independence with HEP, traction as appropriate, prepare for DC in 3-4 sessions.    -GJ       User Key  (r) = Recorded By, (t) = Taken By, (c) = Cosigned By    Initials Name Provider Type    LUIS ALFREDO Purcell, PT Physical Therapist                Modalities       02/06/18 1300          Moist Heat    MH Applied Yes  -GJ      Location T/L/S  -GJ      Rx Minutes 15 mins  -GJ      Traction 18271    Traction Type Lumbar  -GJ      Rx Minutes 15 min  -GJ      Duration Intermittent  -GJ      Position Hook-lying  -GJ      Weight 50   /25  -GJ      Hold 45  -GJ      Relax 15  -GJ        User Key  (r) = Recorded By, (t) = Taken By, (c) = Cosigned By    Initials Name Provider Type    LUIS ALFREDO Purcell, PT Physical Therapist                Exercises       02/06/18 1300          Subjective Comments    Subjective Comments I'm doing well.  I still have days where I get the pain, but it's not predictible.   -GJ      Subjective Pain    Pre-Treatment Pain Level 1  -GJ      Exercise 1    Exercise Name 1 Calf stretch off edge of step  -GJ      Cueing 1 Verbal  -GJ      Reps 1 2  -GJ      Time (Seconds) 1 30  -GJ      Exercise 2    Exercise Name 2 HS stretch on step  -GJ       Cueing 2 Verbal  -GJ      Reps 2 3  -GJ      Time (Seconds) 2 30  -GJ      Exercise 3    Exercise Name 3 Marching/TA, sitting on blue therapy ball  -GJ      Reps 3 3  -GJ      Time (Seconds) 3 20  -GJ      Additional Comments PPT, hands across chest   -GJ      Exercise 4    Exercise Name 4 Piriformis stretch-B  -GJ      Cueing 4 Verbal  -GJ      Reps 4 2  -GJ      Time (Seconds) 4 30  -GJ      Additional Comments reach through and push away  -GJ      Exercise 5    Exercise Name 5 LAQ with TA  -GJ      Cueing 5 Verbal  -GJ      Reps 5 15  -GJ      Additional Comments 4#  -GJ      Exercise 6    Exercise Name 6 Bridges, BLE on ball, BUE flexed to 90  -GJ      Reps 6 5  -GJ      Time (Seconds) 6 15  -GJ      Exercise 7    Exercise Name 7 Lat pulls/SLS  -GJ      Cueing 7 Demo  -GJ      Sets 7 2  -GJ      Reps 7 15  -GJ      Additional Comments BTB  -GJ      Exercise 8    Exercise Name 8 nustep, UE/LE  -GJ      Time (Minutes) 8 5  -GJ      Exercise 9    Exercise Name 9 SL hip abd, bilaterally  -GJ      Cueing 9 Verbal  -GJ      Reps 9 15  -GJ      Additional Comments 4#  -GJ      Exercise 10    Exercise Name 10 Quadruped bird dogs  -GJ      Cueing 10 Demo  -GJ      Reps 10 10  -GJ      Time (Seconds) 10 3  -GJ      Additional Comments PPT first  -GJ      Exercise 11    Exercise Name 11 HS curls with TA  -GJ      Reps 11 20  -GJ      Additional Comments 4#  -GJ      Exercise 12    Exercise Name 12 clamshells  -GJ      Cueing 12 Demo  -GJ      Reps 12 20  -GJ      Time (Seconds) 12 3  -GJ      Exercise 13    Exercise Name 13 Kneeling QL stretch  -GJ      Reps 13 1  -GJ      Time (Seconds) 13 30  -GJ      Exercise 14    Exercise Name 14 Single limb bridge  -GJ      Reps 14 5  -GJ      Additional Comments bilateral  -GJ      Exercise 15    Exercise Name 15 Mini squats on BOSU  -GJ      Cueing 15 Demo  -GJ      Reps 15 15  -GJ      Additional Comments glut squeeze at top  -GJ        User Key  (r) = Recorded By, (t) = Taken  By, (c) = Cosigned By    Initials Name Provider Type    LUIS ALFREDO Purcell, PT Physical Therapist                               PT OP Goals       02/06/18 1300       PT Short Term Goals    STG Date to Achieve 01/19/18  -GJ     STG 1 pt. to be I with initial HEP to facilitate self management of their condition  -GJ     STG 1 Progress Met  -GJ     STG 2 pt. to be educated in/verbalize understanding of the importance of posture/ergonomics in association with their condition to facilitate self management of their condition  -GJ     STG 2 Progress Met  -GJ     STG 3 pt to demonstrate full L knee ext in sitting to facilitate ease of performing household activities  -GJ     STG 3 Progress Met  -GJ     Long Term Goals    LTG Date to Achieve 02/23/18  -GJ     LTG 1 pt. to be I with advanced HEP to facilitate self management of their condition  -GJ     LTG 1 Progress Partially Met  -GJ     LTG 2 pt to demonstrate/report donning/doffing shoes/socks on L without exacerbation of L hip pain to facilitate ease with self care activities  -GJ     LTG 2 Progress Met  -GJ     LTG 3 pt. to demonstrate >/= 75% of full AROM of L spine in all planes without pain to facilitate ease with dressing  -GJ     LTG 3 Progress Partially Met  -GJ       User Key  (r) = Recorded By, (t) = Taken By, (c) = Cosigned By    Initials Name Provider Type    LUIS ALFREDO Purcell PT Physical Therapist          Therapy Education  Given: HEP, Symptoms/condition management, Pain management, Posture/body mechanics, Mobility training  Program: Progressed  How Provided: Verbal, Demonstration  Provided to: Patient  Level of Understanding: Teach back education performed, Verbalized, Demonstrated              Time Calculation:   Start Time: 1355  Stop Time: 1500  Time Calculation (min): 65 min    Therapy Charges for Today     Code Description Service Date Service Provider Modifiers Qty    44379439929  PT HOT OR COLD PACK TREAT MCARE 2/6/2018 Dante Purcell, PT GP 1     99988899262  PT TRACTION LUMBAR 2/6/2018 Dante Purcell, PT GP 1    44819599890 HC PT THER PROC EA 15 MIN 2/6/2018 Dante Purcell, PT GP 3                    Dante Purcell, PT  2/6/2018

## 2018-02-13 ENCOUNTER — HOSPITAL ENCOUNTER (OUTPATIENT)
Dept: PHYSICAL THERAPY | Facility: HOSPITAL | Age: 68
Setting detail: THERAPIES SERIES
Discharge: HOME | End: 2018-02-13

## 2018-02-13 DIAGNOSIS — M79.10 TRIGGER POINT: ICD-10-CM

## 2018-02-13 DIAGNOSIS — M54.5 LEFT LOW BACK PAIN, UNSPECIFIED CHRONICITY, WITH SCIATICA PRESENCE UNSPECIFIED: Primary | ICD-10-CM

## 2018-02-13 PROCEDURE — 97012 MECHANICAL TRACTION THERAPY: CPT | Performed by: PHYSICAL THERAPIST

## 2018-02-13 PROCEDURE — 97110 THERAPEUTIC EXERCISES: CPT | Performed by: PHYSICAL THERAPIST

## 2018-02-13 NOTE — THERAPY TREATMENT NOTE
Outpatient Physical Therapy Ortho Treatment Note  [facility]     Patient Name: Eve Hurley  : 1950  MRN: [id number]  Today's Date: 2018      Visit Date: 2018    Visit Dx:    ICD-10-CM ICD-9-CM   1. Left low back pain, unspecified chronicity, with sciatica presence unspecified M54.5 724.2   2. Trigger point M79.1 729.1       Patient Active Problem List   Diagnosis   • Hypothyroidism   • Arthralgia of hip   • Osteoporosis   • Hearing difficulty   • Rectal bleeding   • Diverticulitis of intestine without perforation or abscess without bleeding   • Senile osteoporosis   • Abnormal CT of the abdomen   • Buttock trauma   • Hematoma        Past Medical History:   Diagnosis Date   • Actinic keratoses    • Anemia    • Atypical chest pain 2009    SEEN AT [facility] ER   • DDD (degenerative disc disease), cervical    • Disease of thyroid gland     HYPOTHYROIDISM   • Disorder of cecum 2017    CT OF ABD AND PELVIS AT [facility] SHOWED THICKENING IN CECUM, POSSIBLE MASS   • Diverticulitis     MOST RECENTLY ON 19, HAS HAD 4-5 EPISODES IN PAST 20 YEARS.    • Fall    • Fibrocystic breast    • Hearing difficulty    • Hip joint pain    • Hypertension    • Left lower quadrant pain 2004    CT SCAN AT [facility] SHOWED ESSENTIALLY NORMAL EXCEPT 2 VERY SMALL HEPATIC CYSTS AND CYSTIC STRUCTURE ON THE LEFT SIDE OF OF THE PELVIS REPRESENTING A 2CM OVARIAN CYST   • Lower extremity pain, left 06/15/2012    VENOUS DOPLER AT [facility] WAS WNL   • Osteoporosis     GETS PROLIA INJECTIONS   • Perforated ear drum 2014    MICROPERFORATION, LEFT EAR, SAW DR. GREGORY LAGOS   • PONV (postoperative nausea and vomiting)    • Rectal bleed 03/15/2016   • Renal insufficiency 2016   • Vegetarian     VEGAN        Past Surgical History:   Procedure Laterality Date   • BREAST SURGERY Right     CYST ASPIRATION   •  SECTION N/A    • COLONOSCOPY N/A 2004    DIVERTICULOSIS, REDUNDANT SIGMOID, DR. KELLEN ROBERTS AT  [place]   • COLONOSCOPY N/A 8/30/2017    Procedure: COLONOSCOPY INTO CECUM;  Surgeon: Marito Dunbar MD;  Location: [facility] ENDOSCOPY;  Service:    • HEMORRHOIDECTOMY N/A 11/13/2009    DR. KELLEN ROBERTS AT [place]   • TUBAL ABDOMINAL LIGATION Bilateral 1986                             PT Assessment/Plan       02/13/18 1451       PT Assessment    Assessment Comments Ms Hurley is progressing quite well and will be ready for DC to HEP in 2 sessions if she continues to progress at current rate. She has responded well to only being in therapy once a week.   -GJ     PT Plan    PT Plan Comments probable DC to HEP in 2 sessions.    -GJ       User Key  (r) = Recorded By, (t) = Taken By, (c) = Cosigned By    Initials Name Provider Type    LUIS ALFREDO Purcell, PT Physical Therapist                Modalities       02/13/18 1300          Moist Heat    MH Applied Yes  -GJ      Location T/L/S  -GJ      Rx Minutes 15 mins  -GJ      Traction 38130    Traction Type Lumbar  -GJ      Rx Minutes 15 min  -GJ      Duration Intermittent  -GJ      Position Hook-lying  -GJ      Weight 50   /25  -GJ      Hold 45  -GJ      Relax 15  -GJ        User Key  (r) = Recorded By, (t) = Taken By, (c) = Cosigned By    Initials Name Provider Type    LUIS ALFREDO Purcell, PT Physical Therapist                Exercises       02/13/18 1300          Subjective Comments    Subjective Comments feeling really good  -GJ      Subjective Pain    Pre-Treatment Pain Level 0  -GJ      Exercise 1    Exercise Name 1 Calf stretch off edge of step  -GJ      Cueing 1 Verbal  -GJ      Reps 1 2  -GJ      Time (Seconds) 1 30  -GJ      Exercise 2    Exercise Name 2 HS stretch on step  -GJ      Cueing 2 Verbal  -GJ      Reps 2 3  -GJ      Time (Seconds) 2 20  -GJ      Exercise 3    Exercise Name 3 Marching/TA, sitting on blue therapy ball  -GJ      Reps 3 15  -GJ      Exercise 4    Exercise Name 4 Piriformis stretch-B  -GJ      Cueing 4 Verbal  -GJ      Reps 4 2  -GJ      Time  (Seconds) 4 30  -GJ      Additional Comments reach through   -GJ      Exercise 5    Exercise Name 5 LAQ with TA  -GJ      Cueing 5 Verbal  -GJ      Reps 5 15  -GJ      Additional Comments 4#  -GJ      Exercise 6    Exercise Name 6 Bridges, BLE on ball, BUE flexed to 90  -GJ      Reps 6 15  -GJ      Time (Seconds) 6 5  -GJ      Exercise 7    Exercise Name 7 Lat pulls/SLS  -GJ      Cueing 7 Demo  -GJ      Sets 7 2  -GJ      Reps 7 15  -GJ      Additional Comments BTB  -GJ      Exercise 8    Exercise Name 8 nustep, UE/LE  -GJ      Time (Minutes) 8 5  -GJ      Exercise 9    Exercise Name 9 SL hip abd, bilaterally  -GJ      Cueing 9 Verbal  -GJ      Reps 9 15  -GJ      Additional Comments 4#  -GJ      Exercise 10    Exercise Name 10 Quadruped bird dogs  -GJ      Cueing 10 Demo  -GJ      Reps 10 10  -GJ      Time (Seconds) 10 3  -GJ      Additional Comments PPT first  -GJ      Exercise 11    Exercise Name 11 HS curls with TA  -GJ      Reps 11 20  -GJ      Additional Comments 4#  -GJ      Exercise 12    Exercise Name 12 clamshells  -GJ      Cueing 12 Demo  -GJ      Reps 12 20  -GJ      Time (Seconds) 12 3  -GJ      Exercise 13    Exercise Name 13 Kneeling QL stretch  -GJ      Reps 13 1  -GJ      Time (Seconds) 13 30  -GJ      Exercise 14    Exercise Name 14 Single limb bridge  -GJ      Reps 14 5  -GJ      Additional Comments bilateral  -GJ      Exercise 15    Exercise Name 15 Mini squats on BOSU  -GJ      Cueing 15 Demo  -GJ      Reps 15 15  -GJ        User Key  (r) = Recorded By, (t) = Taken By, (c) = Cosigned By    Initials Name Provider Type    GJ Dante Purcell, PT Physical Therapist                               PT OP Goals       02/13/18 1300       PT Short Term Goals    STG Date to Achieve 01/19/18  -GJ     STG 1 pt. to be I with initial HEP to facilitate self management of their condition  -GJ     STG 1 Progress Met  -GJ     STG 2 pt. to be educated in/verbalize understanding of the importance of posture/ergonomics  in association with their condition to facilitate self management of their condition  -GJ     STG 2 Progress Met  -GJ     STG 3 pt to demonstrate full L knee ext in sitting to facilitate ease of performing household activities  -GJ     STG 3 Progress Met  -GJ     Long Term Goals    LTG Date to Achieve 02/23/18  -GJ     LTG 1 pt. to be I with advanced HEP to facilitate self management of their condition  -GJ     LTG 1 Progress Partially Met  -GJ     LTG 2 pt to demonstrate/report donning/doffing shoes/socks on L without exacerbation of L hip pain to facilitate ease with self care activities  -GJ     LTG 2 Progress Met  -GJ     LTG 3 pt. to demonstrate >/= 75% of full AROM of L spine in all planes without pain to facilitate ease with dressing  -GJ     LTG 3 Progress Partially Met  -GJ       User Key  (r) = Recorded By, (t) = Taken By, (c) = Cosigned By    Initials Name Provider Type    GJ Dante Purcell, PT Physical Therapist          Therapy Education  Education Details: discused upcoming dischargeand continuing HEP 2-3 x's per week  Given: HEP, Symptoms/condition management, Pain management, Posture/body mechanics, Mobility training  Program: Reinforced  How Provided: Verbal  Provided to: Patient  Level of Understanding: Verbalized              Time Calculation:   Start Time: 1400  Stop Time: 1502  Time Calculation (min): 62 min    Therapy Charges for Today     Code Description Service Date Service Provider Modifiers Qty    66987507366 HC PT HOT OR COLD PACK TREAT MCARE 2/13/2018 Dante Purcell, PT GP 1    24135400048 HC PT TRACTION LUMBAR 2/13/2018 Dante Purcell, PT GP 1    95990130522 HC PT THER PROC EA 15 MIN 2/13/2018 Dante Purcell, PT GP 3                    Dante Purcell, PT  2/13/2018

## 2018-02-20 ENCOUNTER — HOSPITAL ENCOUNTER (OUTPATIENT)
Dept: PHYSICAL THERAPY | Facility: HOSPITAL | Age: 68
Setting detail: THERAPIES SERIES
Discharge: HOME | End: 2018-02-20

## 2018-02-20 DIAGNOSIS — M54.5 LEFT LOW BACK PAIN, UNSPECIFIED CHRONICITY, WITH SCIATICA PRESENCE UNSPECIFIED: Primary | ICD-10-CM

## 2018-02-20 DIAGNOSIS — M79.10 TRIGGER POINT: ICD-10-CM

## 2018-02-20 PROCEDURE — 97110 THERAPEUTIC EXERCISES: CPT | Performed by: PHYSICAL THERAPIST

## 2018-02-20 PROCEDURE — G8982 BODY POS GOAL STATUS: HCPCS | Performed by: PHYSICAL THERAPIST

## 2018-02-20 PROCEDURE — G8983 BODY POS D/C STATUS: HCPCS | Performed by: PHYSICAL THERAPIST

## 2018-02-20 PROCEDURE — 97012 MECHANICAL TRACTION THERAPY: CPT | Performed by: PHYSICAL THERAPIST

## 2018-02-20 NOTE — THERAPY DISCHARGE NOTE
Outpatient Physical Therapy Ortho Treatment Note/Discharge Summary  [facility]     Patient Name: Eve Hurley  : 1950  MRN: [id number]  Today's Date: 2018      Visit Date: 2018    Visit Dx:    ICD-10-CM ICD-9-CM   1. Left low back pain, unspecified chronicity, with sciatica presence unspecified M54.5 724.2   2. Trigger point M79.1 729.1       Patient Active Problem List   Diagnosis   • Hypothyroidism   • Arthralgia of hip   • Osteoporosis   • Hearing difficulty   • Rectal bleeding   • Diverticulitis of intestine without perforation or abscess without bleeding   • Senile osteoporosis   • Abnormal CT of the abdomen   • Buttock trauma   • Hematoma        Past Medical History:   Diagnosis Date   • Actinic keratoses    • Anemia    • Atypical chest pain 2009    SEEN AT [facility] ER   • DDD (degenerative disc disease), cervical    • Disease of thyroid gland     HYPOTHYROIDISM   • Disorder of cecum 2017    CT OF ABD AND PELVIS AT [facility] SHOWED THICKENING IN CECUM, POSSIBLE MASS   • Diverticulitis     MOST RECENTLY ON 19, HAS HAD 4-5 EPISODES IN PAST 20 YEARS.    • Fall    • Fibrocystic breast    • Hearing difficulty    • Hip joint pain    • Hypertension    • Left lower quadrant pain 2004    CT SCAN AT [facility] SHOWED ESSENTIALLY NORMAL EXCEPT 2 VERY SMALL HEPATIC CYSTS AND CYSTIC STRUCTURE ON THE LEFT SIDE OF OF THE PELVIS REPRESENTING A 2CM OVARIAN CYST   • Lower extremity pain, left 06/15/2012    VENOUS DOPLER AT [facility] WAS WNL   • Osteoporosis     GETS PROLIA INJECTIONS   • Perforated ear drum 2014    MICROPERFORATION, LEFT EAR, SAW DR. GREGORY LAGOS   • PONV (postoperative nausea and vomiting)    • Rectal bleed 03/15/2016   • Renal insufficiency 2016   • Vegetarian     VEGAN        Past Surgical History:   Procedure Laterality Date   • BREAST SURGERY Right     CYST ASPIRATION   •  SECTION N/A    • COLONOSCOPY N/A 2004    DIVERTICULOSIS, REDUNDANT SIGMOID,  DR. KELLEN ROBERTS AT [place]   • COLONOSCOPY N/A 8/30/2017    Procedure: COLONOSCOPY INTO CECUM;  Surgeon: Marito Dunbar MD;  Location: [facility] ENDOSCOPY;  Service:    • HEMORRHOIDECTOMY N/A 11/13/2009    DR. KELLEN ROBERTS AT [place]   • TUBAL ABDOMINAL LIGATION Bilateral 1986                                 Modalities       02/20/18 1400          Moist Heat    MH Applied Yes  -CK      Location T/L/S  -CK      Rx Minutes 15 mins  -CK      Traction 14277    Traction Type Lumbar  -CK      Rx Minutes 15 min  -CK      Duration Intermittent  -CK      Position Hook-lying  -CK      Weight 50   /25  -CK      Hold 45  -CK      Relax 15  -CK        User Key  (r) = Recorded By, (t) = Taken By, (c) = Cosigned By    Initials Name Provider Type    CK Gonzalez Wilcox, PT Physical Therapist                Exercises       02/20/18 1400          Subjective Comments    Subjective Comments I feel great. I dont think i need that last appt  -CK      Subjective Pain    Pre-Treatment Pain Level 0  -CK      Exercise 1    Exercise Name 1 Calf stretch off edge of step  -CK      Cueing 1 Verbal  -CK      Reps 1 2  -CK      Time (Seconds) 1 30  -CK      Exercise 2    Exercise Name 2 HS stretch on step  -CK      Cueing 2 Verbal  -CK      Reps 2 2  -CK      Time (Seconds) 2 30  -CK      Exercise 4    Exercise Name 4 Piriformis stretch-B  -CK      Cueing 4 Verbal  -CK      Reps 4 2  -CK      Time (Seconds) 4 30  -CK      Additional Comments reach through  -CK      Exercise 5    Exercise Name 5 LAQ with TA  -CK      Cueing 5 Verbal  -CK      Reps 5 15  -CK      Additional Comments #5  -CK      Exercise 7    Exercise Name 7 Lat pulls/SLS  -CK      Cueing 7 Demo  -CK      Sets 7 2  -CK      Reps 7 15  -CK      Additional Comments BTB, standing on blue foam  -CK      Exercise 8    Exercise Name 8 nustep, UE/LE  -CK      Time (Minutes) 8 5  -CK      Exercise 9    Exercise Name 9 SL hip abd, bilaterally  -CK      Cueing 9 Verbal  -CK      Reps 9 10  -CK       Additional Comments #5  -CK      Exercise 11    Exercise Name 11 HS curls with TA  -CK      Reps 11 20  -CK      Additional Comments #5  -CK      Exercise 12    Exercise Name 12 clamshells  -CK      Cueing 12 Demo  -CK      Reps 12 15  -CK      Time (Seconds) 12 3  -CK      Additional Comments YTB  -CK      Exercise 13    Exercise Name 13 Kneeling QL stretch  -CK      Reps 13 1  -CK      Time (Seconds) 13 30  -CK      Exercise 14    Exercise Name 14 Single limb bridge  -CK      Reps 14 8  -CK      Additional Comments each side  -CK      Exercise 15    Exercise Name 15 Mini squats on BOSU  -CK      Cueing 15 Demo  -CK      Reps 15 15  -CK      Additional Comments raising ball overhead  -CK        User Key  (r) = Recorded By, (t) = Taken By, (c) = Cosigned By    Initials Name Provider Type    CK Gonzalez Wilcox, PT Physical Therapist                               PT OP Goals       02/20/18 1400       PT Short Term Goals    STG Date to Achieve 01/19/18  -CK     STG 1 pt. to be I with initial HEP to facilitate self management of their condition  -CK     STG 1 Progress Met  -CK     STG 2 pt. to be educated in/verbalize understanding of the importance of posture/ergonomics in association with their condition to facilitate self management of their condition  -CK     STG 2 Progress Met  -CK     STG 3 pt to demonstrate full L knee ext in sitting to facilitate ease of performing household activities  -CK     STG 3 Progress Met  -CK     Long Term Goals    LTG Date to Achieve 02/23/18  -CK     LTG 1 pt. to be I with advanced HEP to facilitate self management of their condition  -CK     LTG 1 Progress Met  -CK     LTG 1 Progress Comments reviewed today  -CK     LTG 2 pt to demonstrate/report donning/doffing shoes/socks on L without exacerbation of L hip pain to facilitate ease with self care activities  -CK     LTG 2 Progress Met  -CK     LTG 3 pt. to demonstrate >/= 75% of full AROM of L spine in all planes without pain to  facilitate ease with dressing  -CK     LTG 3 Progress Met  -CK       User Key  (r) = Recorded By, (t) = Taken By, (c) = Cosigned By    Initials Name Provider Type    YVON Wilcox, PT Physical Therapist          Therapy Education  Given: HEP  Program: Reinforced  How Provided: Verbal  Provided to: Patient  Level of Understanding: Verbalized    Outcome Measure Options: Modifed Owestry  Modified Oswestry  Modified Oswestry Score/Comments: 0%      Time Calculation:   Start Time: 1415  Stop Time: 1500  Time Calculation (min): 45 min    Therapy Charges for Today     Code Description Service Date Service Provider Modifiers Qty    05504643014 HC PT CHNG MAIN POS PROJECTED 2/20/2018 Gonzalez Wilcox, PT GP, CH 1    71600360439 HC PT CHNG MAIN POS DISCHARGE 2/20/2018 Gonzalez Wilcox, PT GP,  1    12081076088 HC PT THER PROC EA 15 MIN 2/20/2018 Gonzalez Wilcox, PT GP 2    11996066282 HC PT HOT OR COLD PACK TREAT MCARE 2/20/2018 Gonzalez Wilcox, PT GP 1    [id number]  PT TRACTION LUMBAR 2/20/2018 Gonzalez Wilcox, PT GP 1          PT G-Codes  Outcome Measure Options: Modifed Owestry  Score: 0%  Functional Limitation: Changing and maintaining body position  Changing and Maintaining Body Position Goal Status (): 0 percent impaired, limited or restricted  Changing and Maintaining Body Position Discharge Status (): 0 percent impaired, limited or restricted     OP PT Discharge Summary  Date of Discharge: 02/20/18  Reason for Discharge: All goals achieved, Maximum functional potential achieved, Independent  Outcomes Achieved: Able to achieve all goals within established timeline  Discharge Destination: Home with home program  Discharge Instructions: Mrs. Hurley seen for 12 skilled therapy sessions. She reports resolution of lumbar and lower leg symptoms. She is capable of self management of HEP at this time. All goals met. She is agreeable to discharge. .      Gonzalez Wilcox, PT  2/20/2018

## 2018-02-27 ENCOUNTER — APPOINTMENT (OUTPATIENT)
Dept: PHYSICAL THERAPY | Facility: HOSPITAL | Age: 68
End: 2018-02-27

## 2018-03-22 DIAGNOSIS — E03.9 HYPOTHYROIDISM, UNSPECIFIED TYPE: Primary | ICD-10-CM

## 2018-03-22 DIAGNOSIS — M81.0 AGE-RELATED OSTEOPOROSIS WITHOUT CURRENT PATHOLOGICAL FRACTURE: ICD-10-CM

## 2018-03-22 DIAGNOSIS — Z11.59 ENCOUNTER FOR HEPATITIS C SCREENING TEST FOR LOW RISK PATIENT: ICD-10-CM

## 2018-03-22 DIAGNOSIS — E55.9 VITAMIN D DEFICIENCY: ICD-10-CM

## 2018-03-24 LAB
25(OH)D3+25(OH)D2 SERPL-MCNC: 73.8 NG/ML (ref 30–100)
ALBUMIN SERPL-MCNC: 4.5 G/DL (ref 3.5–5.2)
ALBUMIN/GLOB SERPL: 2 G/DL
ALP SERPL-CCNC: 32 U/L (ref 39–117)
ALT SERPL-CCNC: 14 U/L (ref 1–33)
APPEARANCE UR: CLEAR
AST SERPL-CCNC: 20 U/L (ref 1–32)
BACTERIA #/AREA URNS HPF: NORMAL /HPF
BASOPHILS # BLD AUTO: 0.06 10*3/MM3 (ref 0–0.2)
BASOPHILS NFR BLD AUTO: 0.9 % (ref 0–1.5)
BILIRUB SERPL-MCNC: 0.4 MG/DL (ref 0.1–1.2)
BILIRUB UR QL STRIP: NEGATIVE
BUN SERPL-MCNC: 15 MG/DL (ref 8–23)
BUN/CREAT SERPL: 15.5 (ref 7–25)
CALCIUM SERPL-MCNC: 9.9 MG/DL (ref 8.6–10.5)
CHLORIDE SERPL-SCNC: 98 MMOL/L (ref 98–107)
CHOLEST SERPL-MCNC: 194 MG/DL (ref 0–200)
CO2 SERPL-SCNC: 26.8 MMOL/L (ref 22–29)
COLOR UR: YELLOW
CREAT SERPL-MCNC: 0.97 MG/DL (ref 0.57–1)
EOSINOPHIL # BLD AUTO: 0.26 10*3/MM3 (ref 0–0.7)
EOSINOPHIL NFR BLD AUTO: 3.8 % (ref 0.3–6.2)
EPI CELLS #/AREA URNS HPF: NORMAL /HPF
ERYTHROCYTE [DISTWIDTH] IN BLOOD BY AUTOMATED COUNT: 13.3 % (ref 11.7–13)
GFR SERPLBLD CREATININE-BSD FMLA CKD-EPI: 57 ML/MIN/1.73
GFR SERPLBLD CREATININE-BSD FMLA CKD-EPI: 70 ML/MIN/1.73
GLOBULIN SER CALC-MCNC: 2.2 GM/DL
GLUCOSE SERPL-MCNC: 83 MG/DL (ref 65–99)
GLUCOSE UR QL: NEGATIVE
HCT VFR BLD AUTO: 43.3 % (ref 35.6–45.5)
HCV AB S/CO SERPL IA: <0.1 S/CO RATIO (ref 0–0.9)
HDLC SERPL-MCNC: 107 MG/DL (ref 40–60)
HGB BLD-MCNC: 13.5 G/DL (ref 11.9–15.5)
HGB UR QL STRIP: NEGATIVE
IMM GRANULOCYTES # BLD: 0.02 10*3/MM3 (ref 0–0.03)
IMM GRANULOCYTES NFR BLD: 0.3 % (ref 0–0.5)
KETONES UR QL STRIP: NEGATIVE
LDLC SERPL CALC-MCNC: 77 MG/DL (ref 0–100)
LDLC/HDLC SERPL: 0.72 {RATIO}
LEUKOCYTE ESTERASE UR QL STRIP: NEGATIVE
LYMPHOCYTES # BLD AUTO: 2.12 10*3/MM3 (ref 0.9–4.8)
LYMPHOCYTES NFR BLD AUTO: 30.7 % (ref 19.6–45.3)
MCH RBC QN AUTO: 30.6 PG (ref 26.9–32)
MCHC RBC AUTO-ENTMCNC: 31.2 G/DL (ref 32.4–36.3)
MCV RBC AUTO: 98.2 FL (ref 80.5–98.2)
MICRO URNS: NORMAL
MICRO URNS: NORMAL
MONOCYTES # BLD AUTO: 0.71 10*3/MM3 (ref 0.2–1.2)
MONOCYTES NFR BLD AUTO: 10.3 % (ref 5–12)
NEUTROPHILS # BLD AUTO: 3.74 10*3/MM3 (ref 1.9–8.1)
NEUTROPHILS NFR BLD AUTO: 54 % (ref 42.7–76)
NITRITE UR QL STRIP: NEGATIVE
PH UR STRIP: 7 [PH] (ref 5–7.5)
PLATELET # BLD AUTO: 321 10*3/MM3 (ref 140–500)
POTASSIUM SERPL-SCNC: 4.7 MMOL/L (ref 3.5–5.2)
PROT SERPL-MCNC: 6.7 G/DL (ref 6–8.5)
PROT UR QL STRIP: NEGATIVE
RBC # BLD AUTO: 4.41 10*6/MM3 (ref 3.9–5.2)
RBC #/AREA URNS HPF: NORMAL /HPF
SODIUM SERPL-SCNC: 138 MMOL/L (ref 136–145)
SP GR UR: 1.01 (ref 1–1.03)
TRIGL SERPL-MCNC: 51 MG/DL (ref 0–150)
TSH SERPL DL<=0.005 MIU/L-ACNC: 1.72 MIU/ML (ref 0.27–4.2)
URINALYSIS REFLEX: NORMAL
UROBILINOGEN UR STRIP-MCNC: 0.2 MG/DL (ref 0.2–1)
VLDLC SERPL CALC-MCNC: 10.2 MG/DL (ref 5–40)
WBC # BLD AUTO: 6.91 10*3/MM3 (ref 4.5–10.7)
WBC #/AREA URNS HPF: NORMAL /HPF

## 2018-03-30 ENCOUNTER — OFFICE VISIT (OUTPATIENT)
Dept: INTERNAL MEDICINE | Facility: CLINIC | Age: 68
End: 2018-03-30

## 2018-03-30 VITALS
HEART RATE: 81 BPM | SYSTOLIC BLOOD PRESSURE: 110 MMHG | BODY MASS INDEX: 19.13 KG/M2 | WEIGHT: 108 LBS | DIASTOLIC BLOOD PRESSURE: 70 MMHG | OXYGEN SATURATION: 97 %

## 2018-03-30 DIAGNOSIS — Z12.4 CERVICAL CANCER SCREENING: Primary | ICD-10-CM

## 2018-03-30 DIAGNOSIS — M81.0 AGE-RELATED OSTEOPOROSIS WITHOUT CURRENT PATHOLOGICAL FRACTURE: ICD-10-CM

## 2018-03-30 DIAGNOSIS — Z00.00 HEALTH CARE MAINTENANCE: ICD-10-CM

## 2018-03-30 DIAGNOSIS — E03.9 HYPOTHYROIDISM, UNSPECIFIED TYPE: ICD-10-CM

## 2018-03-30 DIAGNOSIS — Z12.31 ENCOUNTER FOR SCREENING MAMMOGRAM FOR BREAST CANCER: ICD-10-CM

## 2018-03-30 PROCEDURE — G0009 ADMIN PNEUMOCOCCAL VACCINE: HCPCS | Performed by: INTERNAL MEDICINE

## 2018-03-30 PROCEDURE — 99397 PER PM REEVAL EST PAT 65+ YR: CPT | Performed by: INTERNAL MEDICINE

## 2018-03-30 PROCEDURE — G0101 CA SCREEN;PELVIC/BREAST EXAM: HCPCS | Performed by: INTERNAL MEDICINE

## 2018-03-30 PROCEDURE — 93000 ELECTROCARDIOGRAM COMPLETE: CPT | Performed by: INTERNAL MEDICINE

## 2018-03-30 PROCEDURE — 90670 PCV13 VACCINE IM: CPT | Performed by: INTERNAL MEDICINE

## 2018-03-30 RX ORDER — CLOBETASOL PROPIONATE 0.5 MG/G
OINTMENT TOPICAL EVERY OTHER DAY
Qty: 15 G | Refills: 1 | Status: SHIPPED | OUTPATIENT
Start: 2018-03-30 | End: 2019-04-15

## 2018-03-30 NOTE — PROGRESS NOTES
Subjective   AWV  Hypothyroidism,   Osteoporosis      Eve Hurley is a 68 y.o. female who presents for an annual wellness visit. She is doing well today. She has a history of osteoporosis. She is on prolia for this and is also following w/ endocrinology. She had a recent fall w/ back pain and sciatic distribution pain. This has improved w/ physical therapy. She does exercises from PT every other day now. She is taking gabapentin in a prn fashion. She has a history of hypothyroidism and is euthyroid on synthroid .       Review of Systems   Constitutional: Negative.    HENT: Negative.    Eyes: Negative.    Respiratory: Negative.    Cardiovascular: Negative.    Endocrine: Negative.    Genitourinary: Negative.    Musculoskeletal: Negative.    Skin: Negative.    Allergic/Immunologic: Negative.    Neurological: Negative.    Hematological: Negative.    Psychiatric/Behavioral: Negative.        The following portions of the patient's history were reviewed and updated as appropriate: allergies, current medications, past family history, past medical history, past social history, past surgical history and problem list.     Patient Active Problem List   Diagnosis   • Hypothyroidism   • Arthralgia of hip   • Osteoporosis   • Hearing difficulty   • Rectal bleeding   • Diverticulitis of intestine without perforation or abscess without bleeding   • Senile osteoporosis   • Abnormal CT of the abdomen   • Buttock trauma   • Hematoma       Past Medical History:   Diagnosis Date   • Actinic keratoses    • Anemia    • Atypical chest pain 12/26/2009    SEEN AT [facility] ER   • DDD (degenerative disc disease), cervical    • Disease of thyroid gland     HYPOTHYROIDISM   • Disorder of cecum 07/18/2017    CT OF ABD AND PELVIS AT [facility] SHOWED THICKENING IN CECUM, POSSIBLE MASS   • Diverticulitis     MOST RECENTLY ON 7-8-17, HAS HAD 4-5 EPISODES IN PAST 20 YEARS.    • Fall    • Fibrocystic breast    • Hearing difficulty    • Hip joint pain    •  Hypertension    • Left lower quadrant pain 2004    CT SCAN AT [facility] SHOWED ESSENTIALLY NORMAL EXCEPT 2 VERY SMALL HEPATIC CYSTS AND CYSTIC STRUCTURE ON THE LEFT SIDE OF OF THE PELVIS REPRESENTING A 2CM OVARIAN CYST   • Lower extremity pain, left 06/15/2012    VENOUS DOPLER AT [facility] WAS WNL   • Osteoporosis     GETS PROLIA INJECTIONS   • Perforated ear drum 2014    MICROPERFORATION, LEFT EAR, SAW DR. GREGORY LAGOS   • PONV (postoperative nausea and vomiting)    • Rectal bleed 03/15/2016   • Renal insufficiency 2016   • Vegetarian     VEGAN       Past Surgical History:   Procedure Laterality Date   • BREAST SURGERY Right     CYST ASPIRATION   •  SECTION N/A    • COLONOSCOPY N/A 2004    DIVERTICULOSIS, REDUNDANT SIGMOID, DR. KELLEN ROEBRTS AT [place]   • COLONOSCOPY N/A 2017    Procedure: COLONOSCOPY INTO CECUM;  Surgeon: Marito Dunbar MD;  Location: [facility] ENDOSCOPY;  Service:    • HEMORRHOIDECTOMY N/A 2009    DR. KELLEN ROBERTS AT [place]   • TUBAL ABDOMINAL LIGATION Bilateral        Family History   Problem Relation Age of Onset   • Coronary artery disease Father    • Heart disease Father    • Hypertension Father    • Arthritis Father    • Alcohol abuse Father    • Hyperthyroidism Brother    • Thyroid disease Brother    • Diabetes Daughter    • Coronary artery disease Maternal Grandmother    • Arthritis Maternal Grandmother    • Asthma Maternal Grandmother    • ALS Mother    • Depression Mother    • Early death Mother    • Thyroid disease Sister    • Heart disease Maternal Grandfather    • Alcohol abuse Maternal Grandfather    • Arthritis Paternal Grandmother    • Diabetes Paternal Grandmother    • Alcohol abuse Paternal Grandfather    • Macular degeneration Paternal Grandfather    • Heart attack Paternal Grandfather    • Alcohol abuse Sister    • Depression Sister        Social History     Social History   • Marital status:      Spouse name: N/A   • Number of  children: N/A   • Years of education: N/A     Occupational History   • Not on file.     Social History Main Topics   • Smoking status: Former Smoker     Packs/day: 1.00     Years: 15.00     Types: Cigarettes   • Smokeless tobacco: Never Used   • Alcohol use 1.2 oz/week     2 Glasses of wine per week   • Drug use: No   • Sexual activity: Not Currently     Other Topics Concern   • Not on file     Social History Narrative   • No narrative on file       Current Outpatient Prescriptions on File Prior to Visit   Medication Sig Dispense Refill   • acetaminophen (TYLENOL) 500 MG tablet Take 1,000 mg by mouth every day.     • calcium carbonate EX (TUMS EX) 750 MG chewable tablet Chew 750 mg 4 (four) times a day.     • Calcium Carbonate-Vit D-Min (CALTRATE 600+D PLUS MINERALS PO) Take 1,600 Units by mouth daily.     • Cholecalciferol (VITAMIN D3) 2000 UNITS capsule Take  by mouth.     • denosumab (PROLIA) 60 MG/ML solution syringe Inject 60 mg under the skin every 6 (six) months.     • Flaxseed, Linseed, (FLAXSEED OIL) 1000 MG capsule Take  by mouth.     • levothyroxine (SYNTHROID, LEVOTHROID) 75 MCG tablet Take  by mouth.     • Multiple Vitamin (MULTIVITAMINS PO) Take  by mouth.     • polycarbophil (FIBERCON) 625 MG tablet Take 625 mg by mouth daily.     • Probiotic Product (PROBIOTIC DAILY PO)      • vitamin B-12 (CYANOCOBALAMIN) 1000 MCG tablet Take  by mouth.     • gabapentin (NEURONTIN) 100 MG capsule Take 1 capsule by mouth 3 (Three) Times a Day. 90 capsule 3     No current facility-administered medications on file prior to visit.        Allergies   Allergen Reactions   • Sulfa Antibiotics Swelling     SWOLLEN LIPS   • Penicillins Rash       Immunization History   Administered Date(s) Administered   • Flu Vaccine High Dose PF 65YR+ 09/18/2017   • Hepatitis A 09/10/2012, 04/04/2013   • Tdap 09/10/2012   • Zoster 09/10/2012       Objective     /70   Pulse 81   Wt 49 kg (108 lb)   LMP  (LMP Unknown)   SpO2 97%    BMI 19.13 kg/m²     Physical Exam   Constitutional: She is oriented to person, place, and time. She appears well-developed and well-nourished.   HENT:   Head: Normocephalic and atraumatic.   Right Ear: Hearing, tympanic membrane and external ear normal.   Left Ear: Hearing, tympanic membrane and external ear normal.   Nose: Nose normal.   Mouth/Throat: Oropharynx is clear and moist.   Eyes: Conjunctivae and EOM are normal. Pupils are equal, round, and reactive to light.   Neck: Normal range of motion. Neck supple. No thyromegaly present.   Cardiovascular: Normal rate, regular rhythm, normal heart sounds and intact distal pulses.    No murmur heard.  Pulmonary/Chest: Effort normal and breath sounds normal. Right breast exhibits no inverted nipple, no mass, no nipple discharge, no skin change and no tenderness. Left breast exhibits no inverted nipple, no mass, no nipple discharge, no skin change and no tenderness. Breasts are symmetrical. There is no breast swelling.   Abdominal: Soft. She exhibits no distension. There is no hepatosplenomegaly. There is no tenderness. Hernia confirmed negative in the right inguinal area and confirmed negative in the left inguinal area.   Genitourinary: Vagina normal and uterus normal. Rectal exam shows guaiac negative stool. No breast tenderness, discharge or bleeding. There is labial fusion. There is no rash or tenderness on the right labia. There is no rash or tenderness on the left labia. No vaginal discharge found.   Musculoskeletal: Normal range of motion.   Lymphadenopathy:     She has no cervical adenopathy.        Right: No inguinal adenopathy present.        Left: No inguinal adenopathy present.   Neurological: She is alert and oriented to person, place, and time.   Skin: Skin is warm and dry.   Psychiatric: She has a normal mood and affect. Her speech is normal and behavior is normal. Judgment and thought content normal. Cognition and memory are normal.   Nursing note and  vitals reviewed.    EKG for hypothyroidism. Sinus. No st changes. Unchanged from prior.     Assessment/Plan   Eve was seen today for annual exam.    Diagnoses and all orders for this visit:    Health care maintenance  -     ECG 12 Lead    Encounter for screening mammogram for breast cancer  -     Mammo screening digital tomosynthesis bilateral w CAD; Future    Age-related osteoporosis without current pathological fracture    Hypothyroidism, unspecified type        Discussion    AWV.  See scanned forms for abdias history, PHQ-9, functional ability questionnaire, cognitive impairment screening and preventive services check list.  These were all reviewed with the patient and the patient was provided with a copy of the preventative services checklist. Direct observation of cognitive ability was evaluated and is normal. Patient was given health advice or handouts on the following:  nutrition, fall prevention, exercise, weight management  Patient doing well today. She will continue current meds and f/u w/ endocrinology in regards to hypothyroidism and osteoporosis.              Future Appointments  Date Time Provider Department Center   5/1/2018 1:00 PM ROOM 7 KAREN NICHOL  KAREN JONES

## 2018-04-03 LAB
CYTOLOGIST CVX/VAG CYTO: ABNORMAL
CYTOLOGY CVX/VAG DOC THIN PREP: ABNORMAL
DX ICD CODE: ABNORMAL
HIV 1 & 2 AB SER-IMP: ABNORMAL
HPV I/H RISK 1 DNA CVX QL PROBE+SIG AMP: POSITIVE
OTHER STN SPEC: ABNORMAL
PATH REPORT.FINAL DX SPEC: ABNORMAL
STAT OF ADQ CVX/VAG CYTO-IMP: ABNORMAL

## 2018-04-11 ENCOUNTER — TELEPHONE (OUTPATIENT)
Dept: INTERNAL MEDICINE | Facility: CLINIC | Age: 68
End: 2018-04-11

## 2018-04-11 NOTE — TELEPHONE ENCOUNTER
Patient called states she is traveling to [place] in June. She went to the travel clinic. The is a medication called Diamox that is recommend to have when traveling to [place]. The travel clinic doesn't feel comfortable giving this medication to her because she has allergie to sulfa antibiotics.   She is asking if you can prescribe this medication to her. Please advised.

## 2018-04-12 ENCOUNTER — APPOINTMENT (OUTPATIENT)
Dept: WOMENS IMAGING | Facility: HOSPITAL | Age: 68
End: 2018-04-12

## 2018-04-12 PROCEDURE — 77063 BREAST TOMOSYNTHESIS BI: CPT | Performed by: RADIOLOGY

## 2018-04-12 PROCEDURE — 77067 SCR MAMMO BI INCL CAD: CPT | Performed by: RADIOLOGY

## 2018-04-13 NOTE — TELEPHONE ENCOUNTER
I can not prescribe a medication that she may have an allergic response to. She should read up on prevention of altitude sickness. Primarily focusing on hydration.

## 2018-04-26 ENCOUNTER — TELEPHONE (OUTPATIENT)
Dept: INTERNAL MEDICINE | Facility: CLINIC | Age: 68
End: 2018-04-26

## 2018-04-26 DIAGNOSIS — M81.0 SENILE OSTEOPOROSIS: Primary | ICD-10-CM

## 2018-05-01 ENCOUNTER — HOSPITAL ENCOUNTER (OUTPATIENT)
Dept: INFUSION THERAPY | Facility: HOSPITAL | Age: 68
Discharge: HOME | End: 2018-05-01

## 2018-05-09 ENCOUNTER — HOSPITAL ENCOUNTER (OUTPATIENT)
Dept: INFUSION THERAPY | Facility: HOSPITAL | Age: 68
Discharge: HOME | End: 2018-05-09
Admitting: INTERNAL MEDICINE

## 2018-05-09 VITALS
TEMPERATURE: 98.4 F | HEIGHT: 62 IN | HEART RATE: 81 BPM | DIASTOLIC BLOOD PRESSURE: 72 MMHG | BODY MASS INDEX: 19.69 KG/M2 | SYSTOLIC BLOOD PRESSURE: 110 MMHG | OXYGEN SATURATION: 97 % | RESPIRATION RATE: 12 BRPM | WEIGHT: 107 LBS

## 2018-05-09 DIAGNOSIS — M81.0 AGE-RELATED OSTEOPOROSIS WITHOUT CURRENT PATHOLOGICAL FRACTURE: ICD-10-CM

## 2018-05-09 PROCEDURE — 25010000002 DENOSUMAB 60 MG/ML SOLUTION: Performed by: INTERNAL MEDICINE

## 2018-05-09 PROCEDURE — 96372 THER/PROPH/DIAG INJ SC/IM: CPT

## 2018-05-09 RX ADMIN — DENOSUMAB 60 MG: 60 INJECTION SUBCUTANEOUS at 15:08

## 2018-05-09 NOTE — PATIENT INSTRUCTIONS
Denosumab injection  What is this medicine?  DENOSUMAB (den oh vicente mab) slows bone breakdown. Prolia is used to treat osteoporosis in women after menopause and in men. Xgeva is used to treat a high calcium level due to cancer and to prevent bone fractures and other bone problems caused by multiple myeloma or cancer bone metastases. Xgeva is also used to treat giant cell tumor of the bone.  This medicine may be used for other purposes; ask your health care provider or pharmacist if you have questions.  COMMON BRAND NAME(S): Prolia, XGEVA  What should I tell my health care provider before I take this medicine?  They need to know if you have any of these conditions:  -dental disease  -having surgery or tooth extraction  -infection  -kidney disease  -low levels of calcium or Vitamin D in the blood  -malnutrition  -on hemodialysis  -skin conditions or sensitivity  -thyroid or parathyroid disease  -an unusual reaction to denosumab, other medicines, foods, dyes, or preservatives  -pregnant or trying to get pregnant  -breast-feeding  How should I use this medicine?  This medicine is for injection under the skin. It is given by a health care professional in a hospital or clinic setting.  If you are getting Prolia, a special MedGuide will be given to you by the pharmacist with each prescription and refill. Be sure to read this information carefully each time.  For Prolia, talk to your pediatrician regarding the use of this medicine in children. Special care may be needed. For Xgeva, talk to your pediatrician regarding the use of this medicine in children. While this drug may be prescribed for children as young as 13 years for selected conditions, precautions do apply.  Overdosage: If you think you have taken too much of this medicine contact a poison control center or emergency room at once.  NOTE: This medicine is only for you. Do not share this medicine with others.  What if I miss a dose?  It is important not to miss your  dose. Call your doctor or health care professional if you are unable to keep an appointment.  What may interact with this medicine?  Do not take this medicine with any of the following medications:  -other medicines containing denosumab  This medicine may also interact with the following medications:  -medicines that lower your chance of fighting infection  -steroid medicines like prednisone or cortisone  This list may not describe all possible interactions. Give your health care provider a list of all the medicines, herbs, non-prescription drugs, or dietary supplements you use. Also tell them if you smoke, drink alcohol, or use illegal drugs. Some items may interact with your medicine.  What should I watch for while using this medicine?  Visit your doctor or health care professional for regular checks on your progress. Your doctor or health care professional may order blood tests and other tests to see how you are doing.  Call your doctor or health care professional for advice if you get a fever, chills or sore throat, or other symptoms of a cold or flu. Do not treat yourself. This drug may decrease your body's ability to fight infection. Try to avoid being around people who are sick.  You should make sure you get enough calcium and vitamin D while you are taking this medicine, unless your doctor tells you not to. Discuss the foods you eat and the vitamins you take with your health care professional.  See your dentist regularly. Brush and floss your teeth as directed. Before you have any dental work done, tell your dentist you are receiving this medicine.  Do not become pregnant while taking this medicine or for 5 months after stopping it. Talk with your doctor or health care professional about your birth control options while taking this medicine. Women should inform their doctor if they wish to become pregnant or think they might be pregnant. There is a potential for serious side effects to an unborn child. Talk  to your health care professional or pharmacist for more information.  What side effects may I notice from receiving this medicine?  Side effects that you should report to your doctor or health care professional as soon as possible:  -allergic reactions like skin rash, itching or hives, swelling of the face, lips, or tongue  -bone pain  -breathing problems  -dizziness  -jaw pain, especially after dental work  -redness, blistering, peeling of the skin  -signs and symptoms of infection like fever or chills; cough; sore throat; pain or trouble passing urine  -signs of low calcium like fast heartbeat, muscle cramps or muscle pain; pain, tingling, numbness in the hands or feet; seizures  -unusual bleeding or bruising  -unusually weak or tired  Side effects that usually do not require medical attention (report to your doctor or health care professional if they continue or are bothersome):  -constipation  -diarrhea  -headache  -joint pain  -loss of appetite  -muscle pain  -runny nose  -tiredness  -upset stomach  This list may not describe all possible side effects. Call your doctor for medical advice about side effects. You may report side effects to FDA at 9-964-FDA-5298.  Where should I keep my medicine?  This medicine is only given in a clinic, doctor's office, or other health care setting and will not be stored at home.  NOTE: This sheet is a summary. It may not cover all possible information. If you have questions about this medicine, talk to your doctor, pharmacist, or health care provider.  © 2018 [employer] (2018-01-09 19:17:21)

## 2018-05-10 ENCOUNTER — TELEPHONE (OUTPATIENT)
Dept: INTERNAL MEDICINE | Facility: CLINIC | Age: 68
End: 2018-05-10

## 2018-05-10 NOTE — TELEPHONE ENCOUNTER
----- Message from Gladys Gutierrez MD sent at 5/8/2018  5:09 PM EDT -----  I received a question about a fee for bone scan. Patient received her bone density testing her on 5/3/18. This is likely the reason for the billing.         Pt knew nothing of this message.   But she would like to get the results and wants a copy to go to Dr Demarcus Dupree

## 2018-05-15 ENCOUNTER — HOSPITAL ENCOUNTER (OUTPATIENT)
Dept: PHYSICAL THERAPY | Facility: HOSPITAL | Age: 68
Setting detail: THERAPIES SERIES
Discharge: HOME | End: 2018-05-15

## 2018-05-15 ENCOUNTER — OFFICE VISIT (OUTPATIENT)
Dept: INTERNAL MEDICINE | Facility: CLINIC | Age: 68
End: 2018-05-15

## 2018-05-15 VITALS
HEART RATE: 68 BPM | WEIGHT: 110 LBS | DIASTOLIC BLOOD PRESSURE: 60 MMHG | OXYGEN SATURATION: 99 % | BODY MASS INDEX: 20.12 KG/M2 | SYSTOLIC BLOOD PRESSURE: 114 MMHG

## 2018-05-15 DIAGNOSIS — M54.5 LEFT LOW BACK PAIN, UNSPECIFIED CHRONICITY, WITH SCIATICA PRESENCE UNSPECIFIED: Primary | ICD-10-CM

## 2018-05-15 DIAGNOSIS — M54.16 LUMBAR RADICULOPATHY: Primary | ICD-10-CM

## 2018-05-15 DIAGNOSIS — M79.10 TRIGGER POINT: ICD-10-CM

## 2018-05-15 PROCEDURE — 97110 THERAPEUTIC EXERCISES: CPT | Performed by: PHYSICAL THERAPIST

## 2018-05-15 PROCEDURE — 99214 OFFICE O/P EST MOD 30 MIN: CPT | Performed by: INTERNAL MEDICINE

## 2018-05-15 PROCEDURE — G8981 BODY POS CURRENT STATUS: HCPCS | Performed by: PHYSICAL THERAPIST

## 2018-05-15 PROCEDURE — 97162 PT EVAL MOD COMPLEX 30 MIN: CPT | Performed by: PHYSICAL THERAPIST

## 2018-05-15 PROCEDURE — G8982 BODY POS GOAL STATUS: HCPCS | Performed by: PHYSICAL THERAPIST

## 2018-05-15 RX ORDER — MELOXICAM 15 MG/1
15 TABLET ORAL DAILY
Qty: 30 TABLET | Refills: 3 | Status: SHIPPED | OUTPATIENT
Start: 2018-05-15 | End: 2018-07-03

## 2018-05-15 RX ORDER — OMEPRAZOLE 20 MG/1
20 CAPSULE, DELAYED RELEASE ORAL DAILY
Qty: 30 CAPSULE | Refills: 2 | Status: SHIPPED | OUTPATIENT
Start: 2018-05-15 | End: 2019-04-15

## 2018-05-15 RX ORDER — GABAPENTIN 300 MG/1
300 CAPSULE ORAL 3 TIMES DAILY
Qty: 90 CAPSULE | Refills: 1 | Status: SHIPPED | OUTPATIENT
Start: 2018-05-15 | End: 2018-07-03 | Stop reason: DRUGHIGH

## 2018-05-15 RX ORDER — METHYLPREDNISOLONE 4 MG/1
TABLET ORAL
Qty: 21 EACH | Refills: 0 | Status: SHIPPED | OUTPATIENT
Start: 2018-05-15 | End: 2018-05-30

## 2018-05-15 NOTE — PROGRESS NOTES
Chief Complaint   Patient presents with   • Back Pain       History of Present Illness   Eve Hurley is a 68 y.o. female presents for acute care. She is having increasing left side sciatic type nerve pain. Previous h/o similar symptoms in December. Lumbar spinal films unrevealing. She took gabapentin and went to PT w/ temporary resolution of pain. 2 weeks ago she had pain come back at a 9/10/ much more intense than previous. She is taking 2 ES tylenol tid, ibuprofen 200mg tid, and gabapentin 100 mg tid w/ partial improvement now at a 4/10 level. She is scheduled for PT today, a massage, accupuncture, and weightless floatation. She has not had an MRI. Reports that she can not participate in yoga due to pain.     The following portions of the patient's history were reviewed and updated as appropriate: allergies, current medications, past family history, past medical history, past social history, past surgical history and problem list.  Current Outpatient Prescriptions on File Prior to Visit   Medication Sig Dispense Refill   • acetaminophen (TYLENOL) 500 MG tablet Take 1,000 mg by mouth every day.     • calcium carbonate EX (TUMS EX) 750 MG chewable tablet Chew 750 mg 4 (four) times a day.     • Calcium Carbonate-Vit D-Min (CALTRATE 600+D PLUS MINERALS PO) Take 1,600 Units by mouth daily.     • Cholecalciferol (VITAMIN D3) 2000 UNITS capsule Take  by mouth.     • clobetasol (TEMOVATE) 0.05 % ointment Apply  topically Every Other Day. 15 g 1   • denosumab (PROLIA) 60 MG/ML solution syringe Inject 60 mg under the skin every 6 (six) months.     • Flaxseed, Linseed, (FLAXSEED OIL) 1000 MG capsule Take  by mouth.     • levothyroxine (SYNTHROID, LEVOTHROID) 75 MCG tablet Take  by mouth.     • Multiple Vitamin (MULTIVITAMINS PO) Take  by mouth.     • polycarbophil (FIBERCON) 625 MG tablet Take 625 mg by mouth daily.     • Probiotic Product (PROBIOTIC DAILY PO)      • vitamin B-12 (CYANOCOBALAMIN) 1000 MCG tablet Take  by  mouth.     • [DISCONTINUED] gabapentin (NEURONTIN) 100 MG capsule Take 1 capsule by mouth 3 (Three) Times a Day. 90 capsule 3     No current facility-administered medications on file prior to visit.      Review of Systems   Constitutional: Negative.    HENT: Negative.    Eyes: Negative.    Respiratory: Negative.    Cardiovascular: Negative.    Gastrointestinal: Negative.    Endocrine: Negative.    Genitourinary: Negative.    Musculoskeletal: Positive for back pain.   Skin: Negative.    Allergic/Immunologic: Negative.    Neurological: Positive for numbness.        Left leg radicular pain   Hematological: Negative.    Psychiatric/Behavioral: Negative.        Objective   Physical Exam   Constitutional: She is oriented to person, place, and time. She appears well-developed and well-nourished.   HENT:   Head: Normocephalic and atraumatic.   Right Ear: External ear normal.   Left Ear: External ear normal.   Nose: Nose normal.   Mouth/Throat: Oropharynx is clear and moist.   Eyes: EOM are normal. Pupils are equal, round, and reactive to light.   Neck: Normal range of motion. Neck supple.   Cardiovascular: Normal rate, regular rhythm, normal heart sounds and intact distal pulses.    Pulmonary/Chest: Effort normal and breath sounds normal.   Abdominal: Soft. Bowel sounds are normal.   Musculoskeletal:   FROM back. Some discomfort with full forward flexion.    Neurological: She is alert and oriented to person, place, and time.   Skin: Skin is warm and dry.   Psychiatric: She has a normal mood and affect. Her behavior is normal. Judgment and thought content normal.   Nursing note and vitals reviewed.       /60   Pulse 68   Wt 49.9 kg (110 lb)   LMP  (LMP Unknown)   SpO2 99%   BMI 20.12 kg/m²     Assessment/Plan   Diagnoses and all orders for this visit:    Lumbar radiculopathy  -     MRI Lumbar Spine Without Contrast; Future  -     Ambulatory Referral to Pain Management    Other orders  -     meloxicam (MOBIC) 15 MG  tablet; Take 1 tablet by mouth Daily.  -     MethylPREDNISolone (MEDROL, ELAINE,) 4 MG tablet; Take as directed on package instructions.  -     omeprazole (PRILOSEC) 20 MG capsule; Take 1 capsule by mouth Daily.  -     gabapentin (NEURONTIN) 300 MG capsule; Take 1 capsule by mouth 3 (Three) Times a Day.      Patient with acute lumbar radiculopathy. With treat with NSAID and steroid. Increase gabapentin to 300 mg tid. Will get MRI L spine and refer pain management for possible epidural. She will initiate conservative measures as scheduled w/ PT, massage, and aquatic therapy. She may use dry needling as opposed to physical therapy. GI protection with omeprazole. F/u 5 weeks or prn.

## 2018-05-15 NOTE — THERAPY EVALUATION
Outpatient Physical Therapy Ortho Initial Evaluation   [facility]     Patient Name: Eve Hurley  : 1950  MRN: [id number]  Today's Date: 5/15/2018      Visit Date: 05/15/2018    Patient Active Problem List   Diagnosis   • Hypothyroidism   • Arthralgia of hip   • Osteoporosis   • Hearing difficulty   • Rectal bleeding   • Diverticulitis of intestine without perforation or abscess without bleeding   • Senile osteoporosis   • Abnormal CT of the abdomen   • Buttock trauma   • Hematoma        Past Medical History:   Diagnosis Date   • Actinic keratoses    • Anemia    • Atypical chest pain 2009    SEEN AT [facility] ER   • DDD (degenerative disc disease), cervical    • Disease of thyroid gland     HYPOTHYROIDISM   • Disorder of cecum 2017    CT OF ABD AND PELVIS AT [facility] SHOWED THICKENING IN CECUM, POSSIBLE MASS   • Diverticulitis     MOST RECENTLY ON 17, HAS HAD 4-5 EPISODES IN PAST 20 YEARS.    • Fall    • Fibrocystic breast    • Hearing difficulty    • Hip joint pain    • Hypertension    • Left lower quadrant pain 2004    CT SCAN AT [facility] SHOWED ESSENTIALLY NORMAL EXCEPT 2 VERY SMALL HEPATIC CYSTS AND CYSTIC STRUCTURE ON THE LEFT SIDE OF OF THE PELVIS REPRESENTING A 2CM OVARIAN CYST   • Lower extremity pain, left 06/15/2012    VENOUS DOPLER AT [facility] WAS WNL   • Osteoporosis     GETS PROLIA INJECTIONS   • Perforated ear drum 2014    MICROPERFORATION, LEFT EAR, SAW DR. GREGORY LAGOS   • PONV (postoperative nausea and vomiting)    • Rectal bleed 03/15/2016   • Renal insufficiency 2016   • Vegetarian     VEGAN        Past Surgical History:   Procedure Laterality Date   • BREAST SURGERY Right     CYST ASPIRATION   •  SECTION N/A    • COLONOSCOPY N/A 2004    DIVERTICULOSIS, REDUNDANT SIGMOID, DR. KELLEN ROBERTS AT [place]   • COLONOSCOPY N/A 2017    Procedure: COLONOSCOPY INTO CECUM;  Surgeon: Marito Dunbar MD;  Location: [facility] ENDOSCOPY;  Service:    •  HEMORRHOIDECTOMY N/A 11/13/2009    DR. KELLEN ROBERTS AT [place]   • TUBAL ABDOMINAL LIGATION Bilateral 1986       Visit Dx:     ICD-10-CM ICD-9-CM   1. Left low back pain, unspecified chronicity, with sciatica presence unspecified M54.5 724.2   2. Trigger point M79.1 729.1             Patient History     Row Name 05/15/18 1500             History    Chief Complaint Difficulty with daily activities;Pain  -GJ      Type of Pain Back pain;Hip pain   L  -GJ      Date Current Problem(s) Began --   exacerbated 2 weeks ago, initial injury 9/2017  -GJ      Brief Description of Current Complaint Ms. Hurley is a 69 y/o female.  She reports ~ 2 week exacerbation of L low back pain/ L posterior/lateral hip pain to her foot.  She has a trip to [place] in 3 weeks.  She is due to have MRI tomorrow.  She will start a steroid dose pack today, to start SEB's in one week.  Her pain is worse at night in supine.  Sitting is aggravating.  Initial injury was 9/2017, fell from ladder, received PT, this helped for some time.  Denies red flags.    -GJ      Previous treatment for THIS PROBLEM Massage;Pain Management;Medication;Rehabilitation  -GJ      Patient/Caregiver Goals Relieve pain;Return to prior level of function;Improve mobility;Know what to do to help the symptoms  -GJ      Occupation/sports/leisure activities walking yoga reading mediation puzzles  -GJ      What clinical tests have you had for this problem? X-ray   MRI is scheduled  -GJ      Are you or can you be pregnant No  -GJ         Pain     Pain Location Back;Hip  -GJ      Pain at Present 5  -GJ      Pain at Best 3  -GJ      Pain at Worst 9  -GJ      Pain Description Aching  -GJ      What Performance Factors Make the Current Problem(s) WORSE? sitting, supine  -GJ      What Performance Factors Make the Current Problem(s) BETTER? ice  -GJ         Fall Risk Assessment    Any falls in the past year: Yes  -GJ      Number of falls reported in the last 12 months 1  -GJ      Factors that  contributed to the fall: --   fell from ladder  -GJ         Daily Activities    Primary Language English  -GJ      Are you able to read Yes  -GJ      Are you able to write Yes  -GJ      How does patient learn best? Reading  -GJ      Teaching needs identified Home Exercise Program;Management of Condition  -GJ      Patient is concerned about/has problems with Flexibility;Performing home management (household chores, shopping, care of dependents);Sitting;Standing;Transfers (getting out of a chair, bed);Walking  -GJ      Barriers to learning None  -GJ      Pt Participated in POC and Goals Yes  -GJ         Safety    Are you being hurt, hit, or frightened by anyone at home or in your life? No  -GJ      Are you being neglected by a caregiver No  -GJ        User Key  (r) = Recorded By, (t) = Taken By, (c) = Cosigned By    Initials Name Provider Type    LUIS ALFREDO Purcell PT Physical Therapist                PT Ortho     Row Name 05/15/18 1500       Posture/Observations    Alignment Options Lumbar lordosis;Iliac crests  -GJ    Lumbar lordosis Decreased  -GJ    Iliac crests Right:;Elevated  -GJ       Quarter Clearing    Quarter Clearing Lower Quarter Clearing  -GJ       DTR- Lower Quarter Clearing    Patellar tendon (L2-4) Right:;2- Normal response;Left:;1- Minimal response  -GJ    Achilles tendon (S1-2) Bilateral:;2- Normal response  -GJ       Neural Tension Signs- Lower Quarter Clearing    Slump Bilateral:;Negative  -GJ    SLR Bilateral:;Negative  -GJ    Prone knee flexion Bilateral:;Negative  -GJ       Myotomal Screen- Lower Quarter Clearing    Hip flexion (L2) Bilateral:;4+ (Good +)  -GJ    Knee extension (L3) Bilateral:;5 (Normal)  -GJ    Ankle DF (L4) Right:;5 (Normal);Left:;4+ (Good +)   L imroved following pube correction  -GJ    Great toe extension (L5) Bilateral:;5 (Normal)  -GJ    Ankle PF (S1) Bilateral:;4+ (Good +)  -GJ    Knee flexion (S2) Bilateral:;5 (Normal)  -GJ       Lumbar ROM Screen- Lower Quarter  Clearing    Lumbar Flexion Impaired   hesitant, limited by 25%  -GJ    Lumbar Extension Normal  -GJ    Lumbar Lateral Flexion Impaired   imparied bilaterally by 25%  -GJ    Lumbar Rotation Normal  -GJ       SI/Hip Screen- Lower Quarter Clearing    Jung's/Ayan's test Bilateral:;Negative  -GJ    Posterior thigh sheer Bilateral:;Negative  -GJ       Special Tests/Palpation    Special Tests/Palpation Lumbar/SI;Hip   (+) pube correction  -GJ       Lumbar/SI Special Tests    S[place] Test (SI Dysfunction) Negative  -GJ       Hip/Thigh Palpation    Gluteus Medius Left:;Tender;Guarded/taut;Trigger point  -GJ    Gluteus Minimus Left:;Tender;Guarded/taut;Trigger point  -GJ    Piriformis Left:;Tender;Guarded/taut;Trigger point  -GJ       MMT (Manual Muscle Testing)    Additional Documentation General Assessment (Manual Muscle Testing) (Group)  -GJ       General Assessment (Manual Muscle Testing)    General Manual Muscle Testing (MMT) Assessment lower extremity strength deficits identified  -GJ       Lower Extremity (Manual Muscle Testing)    Lower Extremity: Manual Muscle Testing (MMT) right hip strength deficit;left hip strength deficit  -GJ       Left Hip (Manual Muscle Testing)    Left Hip Manual Muscle Testing (MMT) abduction  -GJ    MMT: ABduction, Left Hip abduction  -GJ    MMT, Gross Movement: Left Hip ABduction (4+/5) good plus  -GJ       Right Hip (Manual Muscle Testing)    Right Hip Manual Muscle Testing (MMT) abduction  -GJ    MMT: ABduction, Right Hip abduction  -GJ    MMT, Gross Movement: Right Hip ABduction (4+/5) good plus  -GJ       Lower Extremity Flexibility    Hamstrings Bilateral:;Moderately limited  -GJ    Hip Flexors Bilateral:;Moderately limited  -GJ    Quadriceps Bilateral:;Moderately limited  -GJ    Hip External Rotators Bilateral:;Mildly limited;Moderately limited  -GJ    Hip Internal Rotators Bilateral:;Mildly limited;Moderately limited  -GJ      User Key  (r) = Recorded By, (t) = Taken By,  (c) = Cosigned By    Initials Name Provider Type    LUIS ALFREDO Purcell, PT Physical Therapist                      Therapy Education  Education Details: discussed dx, px, poc, discussed anatomy of the spine/hip and physiology of healing, discussed realistic expectations/time frames. Discussed activity modifications and cautinoed pt. about trying too much too soon to alliviate her condition.  Discussed red flags and what to do in case of red flags  Given: HEP, Symptoms/condition management, Pain management, Posture/body mechanics, Mobility training  Program: New  How Provided: Verbal, Demonstration, Written  Provided to: Patient  Level of Understanding: Teach back education performed, Verbalized, Demonstrated           PT OP Goals     Row Name 05/15/18 1500          PT Short Term Goals    STG Date to Achieve 06/12/18  -GJ     STG 1 pt. to be I with initial HEP to facilitate self management of their condition  -GJ     STG 1 Progress New  -GJ     STG 2 pt. to be educated in/verbalize understanding of the importance of posture/ergonomics in association with their condition to facilitate self management of their condition  -GJ     STG 2 Progress New  -GJ     STG 3 pt to report no sleep disturbance secondary to LBP to facilitate optimal recovery  -GJ     STG 3 Progress New  -GJ        Long Term Goals    LTG Date to Achieve 08/15/18  -GJ     LTG 1 pt. to be I with advanced HEP to facilitate self management of their condition  -GJ     LTG 1 Progress New  -GJ     LTG 2 pt. to report an Oswestry score </= 20% to demonstrate decreased level of perceived disability  -GJ     LTG 2 Progress New  -GJ     LTG 3 pt. to demonstrate L DF MMT 5/5 to facilitate ease/safety with functional mobility  -GJ     LTG 3 Progress New  -GJ     LTG 4 pt to report </= 2/10 LBP/LLE pain with sittign >/= 1 hour to facilitate ease with functional activities/riding plane  -GJ     LTG 4 Progress New  -GJ     LTG 5 pt to demonstrate near level pelvic  landmarks to facilitate optimal muscle balance allowing for ease with sitting/standing  -GJ     LTG 5 Progress New  -GJ        Time Calculation    PT Goal Re-Cert Due Date 08/15/18  -GJ       User Key  (r) = Recorded By, (t) = Taken By, (c) = Cosigned By    Initials Name Provider Type    LUIS ALFREDO Purcell, PT Physical Therapist                PT Assessment/Plan     Row Name 05/15/18 1546          PT Assessment    Functional Limitations Performance in self-care ADL;Limitation in home management;Performance in leisure activities;Limitations in functional capacity and performance;Limitations in community activities  -     Impairments Impaired flexibility;Muscle strength;Pain;Impaired muscle length;Range of motion  -     Assessment Comments Ms. Hurley is a 69 y/o female.  She reports ~ 2 week exacerbation of L low back pain/ L posterior/lateral hip pain to her foot.  She has a trip to [place] in 3 weeks.  She is due to have MRI tomorrow.  She will start a steroid dose pack today, to start SEB's in one week.  Her pain is worse at night in supine.  Sitting is aggravating.  Initial injury was 9/2017, fell from ladder, received PT, this helped for some time.  Denies red flags.  Ms. Hurley demonstrates R posterior innominate. She demonstrates (+) trigger points L glut medius, piriformis tissue.  She demonstrates mildly shortened HS, hip flexor tissues, mild decrease in L DF MMT (this resolved significantly following (+) pube correction).  She demonstrates mild decrease in L hip abd MMT.  She demonstrates mild decrease in L spine AROM all planes.  She reports an Oswestry score of 48%, scored 0-100%, 0% represents no perceived disability.  Ms. Hurley demonstrates progressive s/s consistent with degenerative change of the spine, pelvic obliquity which limits her ability to participate in household, community, and leisure activities.  She may benefit from skilled physical therapy intervention to address the above impairments.     -GJ     Please refer to paper survey for additional self-reported information Yes  -GJ     Rehab Potential Good  -GJ     Patient/caregiver participated in establishment of treatment plan and goals Yes  -GJ     Patient would benefit from skilled therapy intervention Yes  -GJ        PT Plan    PT Frequency 2x/week  -GJ     Predicted Duration of Therapy Intervention (OT Eval) 4 weeks  -GJ     Planned CPT's? PT EVAL MOD COMPLELITY: 15880;PT RE-EVAL: 51737;PT THER PROC EA 15 MIN: 92877;PT MANUAL THERAPY EA 15 MIN: 54587;PT AQUATIC THERAPY EA 15 MIN: 95369;PT HOT OR COLD PACK TREAT MCARE;PT ELECTRICAL STIM UNATTEND: ;PT ULTRASOUND EA 15 MIN: 58441;PT TRACTION LUMBAR: 08811  -GJ     PT Plan Comments assess pelvic landmarks, perform pub correction if necessary, assess response to initial evaluation and pube correction.  Consider DDN to L piriformis/glut tissue, L spine traction (55#/30#) x 10 min. consider prone press ups.    -GJ       User Key  (r) = Recorded By, (t) = Taken By, (c) = Cosigned By    Initials Name Provider Type    LUIS ALFREDO Purcell, PT Physical Therapist                  Exercises     Row Name 05/15/18 1500             Exercise 1    Exercise Name 1 HL hip abd, bilateral  -GJ      Cueing 1 Verbal  -GJ      Reps 1 10  -GJ      Time 1 5  -GJ      Additional Comments GTB  -GJ         Exercise 2    Exercise Name 2 HL hip add, ball  -GJ      Cueing 2 Verbal  -GJ      Reps 2 10  -GJ      Time 2 5  -GJ        User Key  (r) = Recorded By, (t) = Taken By, (c) = Cosigned By    Initials Name Provider Type    LUIS ALFREDO Purcell, PT Physical Therapist           Manual Rx (last 36 hours)      Manual Treatments     Row Name 05/15/18 1500             Manual Rx 3    Manual Rx 3 Location pube correction   -GJ        User Key  (r) = Recorded By, (t) = Taken By, (c) = Cosigned By    Initials Name Provider Type    LUIS ALFREDO Purcell, PT Physical Therapist                      Outcome Measure Options: Modifed Owestry  Modified  Oswestry  Modified Oswestry Score/Comments: 48%      Time Calculation:   Start Time: 1130  Stop Time: 1215  Time Calculation (min): 45 min     Therapy Charges for Today     Code Description Service Date Service Provider Modifiers Qty    72327588694 HC PT CHNG MAIN POS CURRENT 5/15/2018 Dante Purcell, PT GP, CK 1    90220008163 HC PT CHNG MAIN POS PROJECTED 5/15/2018 Dante Purcell, PT GP, CI 1    74809436341 HC PT EVAL MOD COMPLEXITY 2 5/15/2018 Dante Purcell, PT GP 1    35266911090 HC PT THER PROC EA 15 MIN 5/15/2018 Dante Purcell, PT GP 1          PT G-Codes  PT Professional Judgement Used?: Yes  Outcome Measure Options: Modifed Owestry  Score: 48%  Functional Limitation: Changing and maintaining body position  Changing and Maintaining Body Position Current Status (): At least 40 percent but less than 60 percent impaired, limited or restricted  Changing and Maintaining Body Position Goal Status (): At least 1 percent but less than 20 percent impaired, limited or restricted         Dante Purcell, PT  5/15/2018

## 2018-05-16 ENCOUNTER — APPOINTMENT (OUTPATIENT)
Dept: MRI IMAGING | Facility: HOSPITAL | Age: 68
End: 2018-05-16

## 2018-05-16 ENCOUNTER — HOSPITAL ENCOUNTER (OUTPATIENT)
Dept: MRI IMAGING | Facility: HOSPITAL | Age: 68
Discharge: HOME | End: 2018-05-16
Admitting: INTERNAL MEDICINE

## 2018-05-16 DIAGNOSIS — M54.16 LUMBAR RADICULOPATHY: ICD-10-CM

## 2018-05-16 PROCEDURE — 72148 MRI LUMBAR SPINE W/O DYE: CPT

## 2018-05-18 ENCOUNTER — HOSPITAL ENCOUNTER (OUTPATIENT)
Dept: PHYSICAL THERAPY | Facility: HOSPITAL | Age: 68
Setting detail: THERAPIES SERIES
Discharge: HOME | End: 2018-05-18

## 2018-05-18 DIAGNOSIS — M79.10 TRIGGER POINT: ICD-10-CM

## 2018-05-18 DIAGNOSIS — M54.5 LEFT LOW BACK PAIN, UNSPECIFIED CHRONICITY, WITH SCIATICA PRESENCE UNSPECIFIED: Primary | ICD-10-CM

## 2018-05-18 PROCEDURE — 97110 THERAPEUTIC EXERCISES: CPT | Performed by: PHYSICAL THERAPIST

## 2018-05-18 PROCEDURE — 97012 MECHANICAL TRACTION THERAPY: CPT | Performed by: PHYSICAL THERAPIST

## 2018-05-18 NOTE — THERAPY TREATMENT NOTE
Outpatient Physical Therapy Ortho Treatment Note   [facility]     Patient Name: Eve Hurley  : 1950  MRN: [id number]  Today's Date: 2018      Visit Date: 2018    Visit Dx:    ICD-10-CM ICD-9-CM   1. Left low back pain, unspecified chronicity, with sciatica presence unspecified M54.5 724.2   2. Trigger point M79.1 729.1       Patient Active Problem List   Diagnosis   • Hypothyroidism   • Arthralgia of hip   • Osteoporosis   • Hearing difficulty   • Rectal bleeding   • Diverticulitis of intestine without perforation or abscess without bleeding   • Senile osteoporosis   • Abnormal CT of the abdomen   • Buttock trauma   • Hematoma        Past Medical History:   Diagnosis Date   • Actinic keratoses    • Anemia    • Atypical chest pain 2009    SEEN AT [facility] ER   • DDD (degenerative disc disease), cervical    • Disease of thyroid gland     HYPOTHYROIDISM   • Disorder of cecum 2017    CT OF ABD AND PELVIS AT [facility] SHOWED THICKENING IN CECUM, POSSIBLE MASS   • Diverticulitis     MOST RECENTLY ON 17, HAS HAD 4-5 EPISODES IN PAST 20 YEARS.    • Fall    • Fibrocystic breast    • Hearing difficulty    • Hip joint pain    • Hypertension    • Left lower quadrant pain 2004    CT SCAN AT [facility] SHOWED ESSENTIALLY NORMAL EXCEPT 2 VERY SMALL HEPATIC CYSTS AND CYSTIC STRUCTURE ON THE LEFT SIDE OF OF THE PELVIS REPRESENTING A 2CM OVARIAN CYST   • Lower extremity pain, left 06/15/2012    VENOUS DOPLER AT [facility] WAS WNL   • Osteoporosis     GETS PROLIA INJECTIONS   • Perforated ear drum 2014    MICROPERFORATION, LEFT EAR, SAW DR. GREGORY LAGOS   • PONV (postoperative nausea and vomiting)    • Rectal bleed 03/15/2016   • Renal insufficiency 2016   • Vegetarian     VEGAN        Past Surgical History:   Procedure Laterality Date   • BREAST SURGERY Right     CYST ASPIRATION   •  SECTION N/A    • COLONOSCOPY N/A 2004    DIVERTICULOSIS, REDUNDANT SIGMOID, DR. KELLEN ROBERTS  AT [place]   • COLONOSCOPY N/A 8/30/2017    Procedure: COLONOSCOPY INTO CECUM;  Surgeon: Marito Dunbar MD;  Location: [facility] ENDOSCOPY;  Service:    • HEMORRHOIDECTOMY N/A 11/13/2009    DR. KELLEN ROBERTS AT [place]   • TUBAL ABDOMINAL LIGATION Bilateral 1986             PT Ortho     Row Name 05/18/18 1000       Myotomal Screen- Lower Quarter Clearing    Ankle DF (L4) Left:;4+ (Good +);5 (Normal)  -GJ      User Key  (r) = Recorded By, (t) = Taken By, (c) = Cosigned By    Initials Name Provider Type    LUIS ALFREDO Purcell, PT Physical Therapist                            PT Assessment/Plan     Row Name 05/18/18 1400          PT Assessment    Assessment Comments Ms. Hurley returns for first follow up. She has started her steroid pack, has completed MRI, and completed several other complimentary treatments.  She reports improved sleep and demonstrates stronger L DF MMT, and improved L SLR.  We introduced HS 90/90 with DF for neural glide and L spine traction.  She is very motivated, has a trip to [place] coming up soon.    -GJ        PT Plan    PT Plan Comments assess response to L spine traction, repeat as beneficial, assess L DF MMT and L SLR.  ?Jerilyn exercises  -GJ       User Key  (r) = Recorded By, (t) = Taken By, (c) = Cosigned By    Initials Name Provider Type    LUIS ALFREDO Purcell, PT Physical Therapist                Modalities     Row Name 05/18/18 1000             Moist Heat    MH Applied Yes  -GJ      Location MH to L/T spine, pt. supine on traction   -GJ      Rx Minutes 15 mins  -GJ         Traction 76852    Traction Type Lumbar  -GJ      Rx Minutes 15 min  -GJ      Duration Intermittent  -GJ      Position Hook-lying  -GJ      Weight 50   /30  -GJ      Hold 45  -GJ      Relax 15  -GJ        User Key  (r) = Recorded By, (t) = Taken By, (c) = Cosigned By    Initials Name Provider Type    LUIS ALFREDO Purcell, PT Physical Therapist                Exercises     Row Name 05/18/18 1000             Subjective Comments     Subjective Comments The pain is better. I had a day where I thought the pain was gone, then it came back around 1 am, the next night I was abble to sleep until 3 am.   -GJ         Exercise 1    Exercise Name 1 HL hip abd, bilateral  -GJ      Cueing 1 Verbal  -GJ      Reps 1 10  -GJ      Time 1 5  -GJ      Additional Comments GTB  -GJ         Exercise 2    Exercise Name 2 HL hip add, ball  -GJ      Cueing 2 Verbal  -GJ      Reps 2 10  -GJ      Time 2 5  -GJ         Exercise 3    Exercise Name 3 HS 90/90 with DF for neural glide  -GJ      Cueing 3 Demo;Verbal  -GJ      Sets 3 2  -GJ      Reps 3 10  -GJ         Exercise 4    Exercise Name 4 prone press ups  -GJ      Cueing 4 Demo  -GJ      Reps 4 10  -GJ        User Key  (r) = Recorded By, (t) = Taken By, (c) = Cosigned By    Initials Name Provider Type    LUIS ALFREDO Purcell, PT Physical Therapist                               PT OP Goals     Row Name 05/18/18 1300          PT Short Term Goals    STG Date to Achieve 06/12/18  -GJ     STG 1 pt. to be I with initial HEP to facilitate self management of their condition  -GJ     STG 1 Progress Ongoing  -GJ     STG 2 pt. to be educated in/verbalize understanding of the importance of posture/ergonomics in association with their condition to facilitate self management of their condition  -GJ     STG 2 Progress Ongoing  -GJ     STG 2 Progress Comments reviewed  -GJ     STG 3 pt to report no sleep disturbance secondary to LBP to facilitate optimal recovery  -GJ     STG 3 Progress Ongoing  -GJ     STG 3 Progress Comments pt reports improving sleep habits, not waking until 3AM last night.   -GJ        Long Term Goals    LTG Date to Achieve 08/15/18  -GJ     LTG 1 pt. to be I with advanced HEP to facilitate self management of their condition  -GJ     LTG 1 Progress Ongoing  -GJ     LTG 2 pt. to report an Oswestry score </= 20% to demonstrate decreased level of perceived disability  -GJ     LTG 2 Progress Ongoing  -GJ     LTG 3 pt.  to demonstrate L DF MMT 5/5 to facilitate ease/safety with functional mobility  -GJ     LTG 3 Progress Ongoing  -GJ     LTG 3 Progress Comments 4+ to 5/5  -GJ     LTG 4 pt to report </= 2/10 LBP/LLE pain with sittign >/= 1 hour to facilitate ease with functional activities/riding plane  -GJ     LTG 4 Progress Ongoing  -GJ     LTG 5 pt to demonstrate near level pelvic landmarks to facilitate optimal muscle balance allowing for ease with sitting/standing  -GJ     LTG 5 Progress Ongoing  -GJ       User Key  (r) = Recorded By, (t) = Taken By, (c) = Cosigned By    Initials Name Provider Type     Dante Purcell, PT Physical Therapist          Therapy Education  Education Details: discussed holding on yoga until after her trip.  Discussed anatomy of spine/disc, ergonomics bernardo. with excessive flexion.  (15 + min involved in education/assesment of condition/pelvic landmarks)  Given: HEP, Symptoms/condition management, Pain management, Posture/body mechanics, Mobility training  Program: New  How Provided: Verbal, Demonstration, Written  Provided to: Patient  Level of Understanding: Teach back education performed, Verbalized, Demonstrated              Time Calculation:   Start Time: 1046  Stop Time: 1130  Time Calculation (min): 44 min    Therapy Charges for Today     Code Description Service Date Service Provider Modifiers Qty    83297893311 HC PT HOT OR COLD PACK TREAT MCARE 5/18/2018 Dante Purcell, PT GP 1    39484884501 HC PT TRACTION LUMBAR 5/18/2018 Dante Purcell, PT GP 1    32866675532 HC PT THER PROC EA 15 MIN 5/18/2018 Dante Purcell, PT GP 2                    Dante Purcell, PT  5/18/2018

## 2018-05-21 ENCOUNTER — HOSPITAL ENCOUNTER (OUTPATIENT)
Dept: PHYSICAL THERAPY | Facility: HOSPITAL | Age: 68
Setting detail: THERAPIES SERIES
Discharge: HOME | End: 2018-05-21

## 2018-05-21 DIAGNOSIS — M79.10 TRIGGER POINT: ICD-10-CM

## 2018-05-21 DIAGNOSIS — M54.5 LEFT LOW BACK PAIN, UNSPECIFIED CHRONICITY, WITH SCIATICA PRESENCE UNSPECIFIED: Primary | ICD-10-CM

## 2018-05-21 PROCEDURE — 97110 THERAPEUTIC EXERCISES: CPT | Performed by: PHYSICAL THERAPIST

## 2018-05-21 PROCEDURE — 97012 MECHANICAL TRACTION THERAPY: CPT | Performed by: PHYSICAL THERAPIST

## 2018-05-21 NOTE — THERAPY TREATMENT NOTE
Outpatient Physical Therapy Ortho Treatment Note   [facility]     Patient Name: Eve Hurley  : 1950  MRN: [id number]  Today's Date: 2018      Visit Date: 2018    Visit Dx:    ICD-10-CM ICD-9-CM   1. Left low back pain, unspecified chronicity, with sciatica presence unspecified M54.5 724.2   2. Trigger point M79.1 729.1       Patient Active Problem List   Diagnosis   • Hypothyroidism   • Arthralgia of hip   • Osteoporosis   • Hearing difficulty   • Rectal bleeding   • Diverticulitis of intestine without perforation or abscess without bleeding   • Senile osteoporosis   • Abnormal CT of the abdomen   • Buttock trauma   • Hematoma        Past Medical History:   Diagnosis Date   • Actinic keratoses    • Anemia    • Atypical chest pain 2009    SEEN AT [facility] ER   • DDD (degenerative disc disease), cervical    • Disease of thyroid gland     HYPOTHYROIDISM   • Disorder of cecum 2017    CT OF ABD AND PELVIS AT [facility] SHOWED THICKENING IN CECUM, POSSIBLE MASS   • Diverticulitis     MOST RECENTLY ON 17, HAS HAD 4-5 EPISODES IN PAST 20 YEARS.    • Fall    • Fibrocystic breast    • Hearing difficulty    • Hip joint pain    • Hypertension    • Left lower quadrant pain 2004    CT SCAN AT [facility] SHOWED ESSENTIALLY NORMAL EXCEPT 2 VERY SMALL HEPATIC CYSTS AND CYSTIC STRUCTURE ON THE LEFT SIDE OF OF THE PELVIS REPRESENTING A 2CM OVARIAN CYST   • Lower extremity pain, left 06/15/2012    VENOUS DOPLER AT [facility] WAS WNL   • Osteoporosis     GETS PROLIA INJECTIONS   • Perforated ear drum 2014    MICROPERFORATION, LEFT EAR, SAW DR. GREGORY LAGOS   • PONV (postoperative nausea and vomiting)    • Rectal bleed 03/15/2016   • Renal insufficiency 2016   • Vegetarian     VEGAN        Past Surgical History:   Procedure Laterality Date   • BREAST SURGERY Right     CYST ASPIRATION   •  SECTION N/A    • COLONOSCOPY N/A 2004    DIVERTICULOSIS, REDUNDANT SIGMOID, DR. KELLEN ROBERTS  "AT [place]   • COLONOSCOPY N/A 8/30/2017    Procedure: COLONOSCOPY INTO CECUM;  Surgeon: Marito Dunbar MD;  Location: [facility] ENDOSCOPY;  Service:    • HEMORRHOIDECTOMY N/A 11/13/2009    DR. KELLEN ROBERTS AT [place]   • TUBAL ABDOMINAL LIGATION Bilateral 1986                             PT Assessment/Plan     Row Name 05/21/18 1442          PT Assessment    Assessment Comments Patient with reports of well controlled pain.  She consistently centralizes with extension bias and mild peripherlization noted with flexion ie Nustep.  Will continue to focus on lumbar decompression and assess response to added exhale for overpressure with lumbar extension.  -GR        PT Plan    PT Plan Comments Continue current POC including extension bias. Recheck L DF and L SLR on 5/23.  -GR       User Key  (r) = Recorded By, (t) = Taken By, (c) = Cosigned By    Initials Name Provider Type    GR Mike Larson, PT Physical Therapist                Modalities     Row Name 05/21/18 1400             Moist Heat    MH Applied Yes  -GR      Location MH to L/T spine, pt. supine on traction   -GR      Rx Minutes 15 mins  -GR         Traction 86975    Traction Type Lumbar  -GR      Rx Minutes 15 min  -GR      Duration Intermittent  -GR      Position Hook-lying  -GR      Weight 50   /30  -GR      Hold 45  -GR      Relax 15  -GR        User Key  (r) = Recorded By, (t) = Taken By, (c) = Cosigned By    Initials Name Provider Type    GR Mike Larson, PT Physical Therapist                Exercises     Row Name 05/21/18 1400             Subjective Comments    Subjective Comments \"This is the best I have felt yet.\"  -GR         Subjective Pain    Able to rate subjective pain? yes  -GR      Pre-Treatment Pain Level 0  -GR         Exercise 1    Exercise Name 1 HL hip abd, bilateral  -GR      Cueing 1 Verbal  -GR      Reps 1 15  -GR      Time 1 5  -GR      Additional Comments BlueTB  -GR         Exercise 2    Exercise Name 2 HL hip add, ball  -GR      " Cueing 2 Verbal  -GR      Reps 2 15  -GR      Time 2 5  -GR         Exercise 3    Exercise Name 3 HS 90/90 with DF for neural glide  -GR      Cueing 3 Verbal  -GR      Sets 3 1  -GR      Reps 3 3  -GR      Time 3 20 seconds  -GR      Additional Comments each  -GR         Exercise 4    Exercise Name 4 prone press ups  -GR      Cueing 4 Demo  -GR      Reps 4 10  -GR      Additional Comments added exhale during extension  -GR         Exercise 5    Exercise Name 5 Nustep L5 -d/c next visit   -GR      Time 5 5 min  -GR      Additional Comments peripheralized to LLE  -GR        User Key  (r) = Recorded By, (t) = Taken By, (c) = Cosigned By    Initials Name Provider Type    GR Mike Larson, PT Physical Therapist                               PT OP Goals     Row Name 05/21/18 1400          PT Short Term Goals    STG Date to Achieve 06/12/18  -GR     STG 1 pt. to be I with initial HEP to facilitate self management of their condition  -GR     STG 1 Progress Ongoing  -GR     STG 2 pt. to be educated in/verbalize understanding of the importance of posture/ergonomics in association with their condition to facilitate self management of their condition  -GR     STG 2 Progress Ongoing  -GR     STG 3 pt to report no sleep disturbance secondary to LBP to facilitate optimal recovery  -GR     STG 3 Progress Ongoing  -GR        Long Term Goals    LTG Date to Achieve 08/15/18  -GR     LTG 1 pt. to be I with advanced HEP to facilitate self management of their condition  -GR     LTG 1 Progress Ongoing  -GR     LTG 2 pt. to report an Oswestry score </= 20% to demonstrate decreased level of perceived disability  -GR     LTG 2 Progress Ongoing  -GR     LTG 3 pt. to demonstrate L DF MMT 5/5 to facilitate ease/safety with functional mobility  -GR     LTG 3 Progress Ongoing  -GR     LTG 4 pt to report </= 2/10 LBP/LLE pain with sittign >/= 1 hour to facilitate ease with functional activities/riding plane  -GR     LTG 4 Progress Ongoing  -GR      LTG 5 pt to demonstrate near level pelvic landmarks to facilitate optimal muscle balance allowing for ease with sitting/standing  -GR     LTG 5 Progress Ongoing  -GR       User Key  (r) = Recorded By, (t) = Taken By, (c) = Cosigned By    Initials Name Provider Type    GR Mike Larson PT Physical Therapist          Therapy Education  Education Details: adding exhale to prone extension for additional ROM and symptom centralization; avoiding excessive trunk flexion including knees above hips in sitting as able  Given: Symptoms/condition management  Program: Reinforced  How Provided: Verbal  Provided to: Patient  Level of Understanding: Teach back education performed              Time Calculation:   Start Time: 1400  Stop Time: 1445  Time Calculation (min): 45 min  Total Timed Code Minutes- PT: 30 minute(s)    Therapy Charges for Today     Code Description Service Date Service Provider Modifiers Qty    62064097572  PT THER PROC EA 15 MIN 5/21/2018 Mike Larson, PT GP 2    50912694078 HC PT HOT OR COLD PACK TREAT MCARE 5/21/2018 Mike Larson, PT GP 1    [id number]  PT-TRACTION MECHANICAL 5/21/2018 Mike Larson PT  1                    Mike Larson PT  5/21/2018

## 2018-05-23 ENCOUNTER — APPOINTMENT (OUTPATIENT)
Dept: PHYSICAL THERAPY | Facility: HOSPITAL | Age: 68
End: 2018-05-23

## 2018-05-23 ENCOUNTER — HOSPITAL ENCOUNTER (OUTPATIENT)
Dept: PHYSICAL THERAPY | Facility: HOSPITAL | Age: 68
Setting detail: THERAPIES SERIES
Discharge: HOME | End: 2018-05-23

## 2018-05-23 DIAGNOSIS — M79.10 TRIGGER POINT: ICD-10-CM

## 2018-05-23 DIAGNOSIS — M54.5 LEFT LOW BACK PAIN, UNSPECIFIED CHRONICITY, WITH SCIATICA PRESENCE UNSPECIFIED: Primary | ICD-10-CM

## 2018-05-23 PROCEDURE — 97110 THERAPEUTIC EXERCISES: CPT | Performed by: PHYSICAL THERAPIST

## 2018-05-23 PROCEDURE — 97530 THERAPEUTIC ACTIVITIES: CPT | Performed by: PHYSICAL THERAPIST

## 2018-05-23 PROCEDURE — 97012 MECHANICAL TRACTION THERAPY: CPT | Performed by: PHYSICAL THERAPIST

## 2018-05-23 NOTE — THERAPY TREATMENT NOTE
Outpatient Physical Therapy Ortho Treatment Note   [facility]     Patient Name: Eve Hurley  : 1950  MRN: [id number]  Today's Date: 2018      Visit Date: 2018    Visit Dx:    ICD-10-CM ICD-9-CM   1. Left low back pain, unspecified chronicity, with sciatica presence unspecified M54.5 724.2   2. Trigger point M79.1 729.1       Patient Active Problem List   Diagnosis   • Hypothyroidism   • Arthralgia of hip   • Osteoporosis   • Hearing difficulty   • Rectal bleeding   • Diverticulitis of intestine without perforation or abscess without bleeding   • Senile osteoporosis   • Abnormal CT of the abdomen   • Buttock trauma   • Hematoma        Past Medical History:   Diagnosis Date   • Actinic keratoses    • Anemia    • Atypical chest pain 2009    SEEN AT [facility] ER   • DDD (degenerative disc disease), cervical    • Disease of thyroid gland     HYPOTHYROIDISM   • Disorder of cecum 2017    CT OF ABD AND PELVIS AT [facility] SHOWED THICKENING IN CECUM, POSSIBLE MASS   • Diverticulitis     MOST RECENTLY ON 19, HAS HAD 4-5 EPISODES IN PAST 20 YEARS.    • Fall    • Fibrocystic breast    • Hearing difficulty    • Hip joint pain    • Hypertension    • Left lower quadrant pain 2004    CT SCAN AT [facility] SHOWED ESSENTIALLY NORMAL EXCEPT 2 VERY SMALL HEPATIC CYSTS AND CYSTIC STRUCTURE ON THE LEFT SIDE OF OF THE PELVIS REPRESENTING A 2CM OVARIAN CYST   • Lower extremity pain, left 06/15/2012    VENOUS DOPLER AT [facility] WAS WNL   • Osteoporosis     GETS PROLIA INJECTIONS   • Perforated ear drum 2014    MICROPERFORATION, LEFT EAR, SAW DR. GREGORY LAGOS   • PONV (postoperative nausea and vomiting)    • Rectal bleed 03/15/2016   • Renal insufficiency 2016   • Vegetarian     VEGAN        Past Surgical History:   Procedure Laterality Date   • BREAST SURGERY Right     CYST ASPIRATION   •  SECTION N/A    • COLONOSCOPY N/A 2004    DIVERTICULOSIS, REDUNDANT SIGMOID, DR. KELLEN ROBERTS  AT [place]   • COLONOSCOPY N/A 8/30/2017    Procedure: COLONOSCOPY INTO CECUM;  Surgeon: Marito Dunbar MD;  Location: [facility] ENDOSCOPY;  Service:    • HEMORRHOIDECTOMY N/A 11/13/2009    DR. KELLEN ROBERTS AT [place]   • TUBAL ABDOMINAL LIGATION Bilateral 1986                             PT Assessment/Plan     Row Name 05/23/18 0933          PT Assessment    Assessment Comments Patient continues to report reduction in symptoms. No centralization onted with standard press up today; symptoms were reduced with mild L sideglide and recommend trial at home for 1-2 days and monitor symptom response returning to standard press up in neutral if peripherlization occurs with alternative method. Patient is considering cancelling her pain management consultation and if so may return to [facility] for 2 sessions next week.  -GR        PT Plan    PT Plan Comments Assess respose to prone side glide addition to press up; likely return to neutral if centralizing. Continue traction.  -GR       User Key  (r) = Recorded By, (t) = Taken By, (c) = Cosigned By    Initials Name Provider Type    GR Mike Larson, PT Physical Therapist                Modalities     Row Name 05/23/18 0800             Moist Heat    MH Applied Yes  -GR      Location MH to L/T spine, pt. supine on traction   -GR      Rx Minutes 15 mins  -GR         Traction 99131    Traction Type Lumbar  -GR      Rx Minutes 15 min  -GR      Duration Intermittent  -GR      Position Hook-lying  -GR      Weight 50   /30  -GR      Hold 45  -GR      Relax 15  -GR        User Key  (r) = Recorded By, (t) = Taken By, (c) = Cosigned By    Initials Name Provider Type    BEATRIS Larson, PT Physical Therapist                Exercises     Row Name 05/23/18 0800             Subjective Comments    Subjective Comments The pain is definitely better but I was a little bit more sore after last time.  -GR         Subjective Pain    Able to rate subjective pain? yes  -GR      Pre-Treatment Pain  Level 2  -GR         Exercise 1    Exercise Name 1 HL hip abd, bilateral  -GR      Cueing 1 Verbal  -GR      Reps 1 15  -GR      Time 1 5  -GR      Additional Comments BlueTB  -GR         Exercise 2    Exercise Name 2 HL hip add, ball  -GR      Cueing 2 Verbal  -GR      Reps 2 15  -GR      Time 2 5  -GR         Exercise 3    Exercise Name 3 HS 90/90 with DF for neural glide  -GR      Cueing 3 Verbal  -GR      Sets 3 1  -GR      Reps 3 3  -GR      Time 3 20 seconds  -GR         Exercise 4    Exercise Name 4 prone press ups  -GR      Reps 4 10  -GR      Additional Comments + L side glide  -GR        User Key  (r) = Recorded By, (t) = Taken By, (c) = Cosigned By    Initials Name Provider Type    GR Mike Larson, PT Physical Therapist                               PT OP Goals     Row Name 05/23/18 0900          PT Short Term Goals    STG Date to Achieve 06/12/18  -GR     STG 1 pt. to be I with initial HEP to facilitate self management of their condition  -GR     STG 1 Progress Met  -GR     STG 2 pt. to be educated in/verbalize understanding of the importance of posture/ergonomics in association with their condition to facilitate self management of their condition  -GR     STG 2 Progress Ongoing  -GR     STG 3 pt to report no sleep disturbance secondary to LBP to facilitate optimal recovery  -GR     STG 3 Progress Ongoing  -GR        Long Term Goals    LTG Date to Achieve 08/15/18  -GR     LTG 1 pt. to be I with advanced HEP to facilitate self management of their condition  -GR     LTG 1 Progress Ongoing  -GR     LTG 2 pt. to report an Oswestry score </= 20% to demonstrate decreased level of perceived disability  -GR     LTG 2 Progress Ongoing  -GR     LTG 3 pt. to demonstrate L DF MMT 5/5 to facilitate ease/safety with functional mobility  -GR     LTG 3 Progress Ongoing  -GR     LTG 4 pt to report </= 2/10 LBP/LLE pain with sittign >/= 1 hour to facilitate ease with functional activities/riding plane  -GR     LTG 4  Progress Ongoing  -GR     LTG 5 pt to demonstrate near level pelvic landmarks to facilitate optimal muscle balance allowing for ease with sitting/standing  -GR     LTG 5 Progress Ongoing  -GR       User Key  (r) = Recorded By, (t) = Taken By, (c) = Cosigned By    Initials Name Provider Type    GR Mike Larson PT Physical Therapist          Therapy Education  Education Details: sitting posture for upcoming airplane trip to [facility]; centralization vs peripheralization - 15 minutes of education/theract charge  Given: Symptoms/condition management  Program: Reinforced  How Provided: Verbal  Provided to: Patient  Level of Understanding: Teach back education performed              Time Calculation:   Start Time: 0830  Stop Time: 0915  Time Calculation (min): 45 min  Total Timed Code Minutes- PT: 30 minute(s)    Therapy Charges for Today     Code Description Service Date Service Provider Modifiers Qty    76524567399 HC PT THER PROC EA 15 MIN 5/23/2018 Mike Larson, PT GP 1    60136058904 HC PT-TRACTION MECHANICAL 5/23/2018 Mike Larson, PT  1    [id number]  PT HOT OR COLD PACK TREAT MCARE 5/23/2018 Mike Larson, PT GP 1    [id number] HC PT THERAPEUTIC ACT EA 15 MIN 5/23/2018 Mike Larson, PT GP 1                    Mike Larson PT  5/23/2018

## 2018-05-29 ENCOUNTER — TELEPHONE (OUTPATIENT)
Dept: INTERNAL MEDICINE | Facility: CLINIC | Age: 68
End: 2018-05-29

## 2018-05-29 NOTE — TELEPHONE ENCOUNTER
Discussed symptoms with patient. It sounds like orthostatic symptoms. She will slowly go from lie to sit then sit to stand. She will increase electrolytes as needed.

## 2018-05-29 NOTE — TELEPHONE ENCOUNTER
Pt called to say that she is going on vacation next week out of the country. She said she was told to take ibuprofen 24 hours prior to flight. She says she takes mobic and wants to make sure she can take the extra ibuprofen.

## 2018-05-29 NOTE — TELEPHONE ENCOUNTER
She also said with the Neurontin she is experiencing some dizziness and has on two occasions had a fever (100.5)

## 2018-05-30 ENCOUNTER — HOSPITAL ENCOUNTER (OUTPATIENT)
Dept: PHYSICAL THERAPY | Facility: HOSPITAL | Age: 68
Setting detail: THERAPIES SERIES
Discharge: HOME | End: 2018-05-30

## 2018-05-30 ENCOUNTER — HOSPITAL ENCOUNTER (OUTPATIENT)
Dept: PAIN MEDICINE | Facility: HOSPITAL | Age: 68
End: 2018-05-30

## 2018-05-30 ENCOUNTER — OFFICE VISIT (OUTPATIENT)
Dept: INTERNAL MEDICINE | Facility: CLINIC | Age: 68
End: 2018-05-30

## 2018-05-30 VITALS
WEIGHT: 110 LBS | OXYGEN SATURATION: 100 % | SYSTOLIC BLOOD PRESSURE: 116 MMHG | TEMPERATURE: 98.6 F | HEART RATE: 79 BPM | BODY MASS INDEX: 20.24 KG/M2 | HEIGHT: 62 IN | DIASTOLIC BLOOD PRESSURE: 82 MMHG

## 2018-05-30 DIAGNOSIS — M79.10 TRIGGER POINT: ICD-10-CM

## 2018-05-30 DIAGNOSIS — N30.01 ACUTE CYSTITIS WITH HEMATURIA: Primary | ICD-10-CM

## 2018-05-30 DIAGNOSIS — M54.5 LEFT LOW BACK PAIN, UNSPECIFIED CHRONICITY, WITH SCIATICA PRESENCE UNSPECIFIED: Primary | ICD-10-CM

## 2018-05-30 DIAGNOSIS — R31.9 HEMATURIA, UNSPECIFIED TYPE: ICD-10-CM

## 2018-05-30 LAB
BILIRUB BLD-MCNC: NEGATIVE MG/DL
CLARITY, POC: ABNORMAL
COLOR UR: ABNORMAL
GLUCOSE UR STRIP-MCNC: NEGATIVE MG/DL
KETONES UR QL: NEGATIVE
LEUKOCYTE EST, POC: ABNORMAL
NITRITE UR-MCNC: NEGATIVE MG/ML
PH UR: 7 [PH] (ref 5–8)
PROT UR STRIP-MCNC: ABNORMAL MG/DL
RBC # UR STRIP: ABNORMAL /UL
SP GR UR: 1 (ref 1–1.03)
UROBILINOGEN UR QL: NORMAL

## 2018-05-30 PROCEDURE — 97012 MECHANICAL TRACTION THERAPY: CPT | Performed by: PHYSICAL THERAPIST

## 2018-05-30 PROCEDURE — 99214 OFFICE O/P EST MOD 30 MIN: CPT | Performed by: INTERNAL MEDICINE

## 2018-05-30 PROCEDURE — 81003 URINALYSIS AUTO W/O SCOPE: CPT | Performed by: INTERNAL MEDICINE

## 2018-05-30 RX ORDER — CIPROFLOXACIN 500 MG/1
500 TABLET, FILM COATED ORAL EVERY 12 HOURS SCHEDULED
Qty: 14 TABLET | Refills: 0 | Status: SHIPPED | OUTPATIENT
Start: 2018-05-30 | End: 2018-06-06

## 2018-05-30 NOTE — THERAPY TREATMENT NOTE
Outpatient Physical Therapy Ortho Treatment Note   [facility]     Patient Name: Eve Hurley  : 1950  MRN: [id number]  Today's Date: 2018      Visit Date: 2018    Visit Dx:    ICD-10-CM ICD-9-CM   1. Left low back pain, unspecified chronicity, with sciatica presence unspecified M54.5 724.2   2. Trigger point M79.1 729.1       Patient Active Problem List   Diagnosis   • Hypothyroidism   • Arthralgia of hip   • Osteoporosis   • Hearing difficulty   • Rectal bleeding   • Diverticulitis of intestine without perforation or abscess without bleeding   • Senile osteoporosis   • Abnormal CT of the abdomen   • Buttock trauma   • Hematoma        Past Medical History:   Diagnosis Date   • Actinic keratoses    • Anemia    • Atypical chest pain 2009    SEEN AT [facility] ER   • DDD (degenerative disc disease), cervical    • Disease of thyroid gland     HYPOTHYROIDISM   • Disorder of cecum 2017    CT OF ABD AND PELVIS AT [facility] SHOWED THICKENING IN CECUM, POSSIBLE MASS   • Diverticulitis     MOST RECENTLY ON 19, HAS HAD 4-5 EPISODES IN PAST 20 YEARS.    • Fall    • Fibrocystic breast    • Hearing difficulty    • Hip joint pain    • Hypertension    • Left lower quadrant pain 2004    CT SCAN AT [facility] SHOWED ESSENTIALLY NORMAL EXCEPT 2 VERY SMALL HEPATIC CYSTS AND CYSTIC STRUCTURE ON THE LEFT SIDE OF OF THE PELVIS REPRESENTING A 2CM OVARIAN CYST   • Lower extremity pain, left 06/15/2012    VENOUS DOPLER AT [facility] WAS WNL   • Osteoporosis     GETS PROLIA INJECTIONS   • Perforated ear drum 2014    MICROPERFORATION, LEFT EAR, SAW DR. GREGORY LAGOS   • PONV (postoperative nausea and vomiting)    • Rectal bleed 03/15/2016   • Renal insufficiency 2016   • Vegetarian     VEGAN        Past Surgical History:   Procedure Laterality Date   • BREAST SURGERY Right     CYST ASPIRATION   •  SECTION N/A    • COLONOSCOPY N/A 2004    DIVERTICULOSIS, REDUNDANT SIGMOID, DR. KELLEN ROBERTS  AT [place]   • COLONOSCOPY N/A 8/30/2017    Procedure: COLONOSCOPY INTO CECUM;  Surgeon: Marito Dunbar MD;  Location: [facility] ENDOSCOPY;  Service:    • HEMORRHOIDECTOMY N/A 11/13/2009    DR. KELLEN ROBERTS AT [place]   • TUBAL ABDOMINAL LIGATION Bilateral 1986                             PT Assessment/Plan     Row Name 05/30/18 1254          PT Assessment    Assessment Comments Patient came to clinic today with availability for mechanical lumbar traction only.  Her overall status is still much improved, although she appears to be reaching a plateau.  Patient with 2-3 more visits prior to leaving for [place] trip.  Will plan to assist in symptom managment until that time as able and deemed a skilled necessity.  -GR        PT Plan    PT Plan Comments Continue with core stability, traction as tolerated. Consider DDN if beneficial at previous attempt.  -GR       User Key  (r) = Recorded By, (t) = Taken By, (c) = Cosigned By    Initials Name Provider Type    BEATRIS Larson, PT Physical Therapist                Modalities     Row Name 05/30/18 0900             Subjective Comments    Subjective Comments No real change since last week. Cx pain management visit per fear of adverse response prior to trip.  Pain is still better overall.  -GR         Subjective Pain    Able to rate subjective pain? yes  -GR      Pre-Treatment Pain Level 2  -GR      Subjective Pain Comment L thigh  -GR         Moist Heat    MH Applied Yes  -GR      Location MH to L/T spine, pt. supine on traction   -GR      Rx Minutes Other:   20  -GR         Traction 18985    Traction Type Lumbar  -GR      Rx Minutes 20 min  -GR      Duration Intermittent  -GR      Position Hook-lying  -GR      Weight 50   /30  -GR      Hold 45  -GR      Relax 15  -GR        User Key  (r) = Recorded By, (t) = Taken By, (c) = Cosigned By    Initials Name Provider Type    BEATRIS Larson, PT Physical Therapist                Exercises     Row Name 05/30/18 0900              Subjective Comments    Subjective Comments No real change since last week. Cx pain management visit per fear of adverse response prior to trip.  Pain is still better overall.  -GR         Subjective Pain    Able to rate subjective pain? yes  -GR      Pre-Treatment Pain Level 2  -GR      Subjective Pain Comment L thigh  -GR        User Key  (r) = Recorded By, (t) = Taken By, (c) = Cosigned By    Initials Name Provider Type    GR Mike BURTON Marsha, PT Physical Therapist                               PT OP Goals     Row Name 05/30/18 1200          PT Short Term Goals    STG Date to Achieve 06/12/18  -GR     STG 1 pt. to be I with initial HEP to facilitate self management of their condition  -GR     STG 1 Progress Met  -GR     STG 2 pt. to be educated in/verbalize understanding of the importance of posture/ergonomics in association with their condition to facilitate self management of their condition  -GR     STG 2 Progress Ongoing  -GR     STG 3 pt to report no sleep disturbance secondary to LBP to facilitate optimal recovery  -GR     STG 3 Progress Ongoing  -GR        Long Term Goals    LTG Date to Achieve 08/15/18  -GR     LTG 1 pt. to be I with advanced HEP to facilitate self management of their condition  -GR     LTG 1 Progress Ongoing  -GR     LTG 2 pt. to report an Oswestry score </= 20% to demonstrate decreased level of perceived disability  -GR     LTG 2 Progress Ongoing  -GR     LTG 3 pt. to demonstrate L DF MMT 5/5 to facilitate ease/safety with functional mobility  -GR     LTG 3 Progress Ongoing  -GR     LTG 4 pt to report </= 2/10 LBP/LLE pain with sittign >/= 1 hour to facilitate ease with functional activities/riding plane  -GR     LTG 4 Progress Ongoing  -GR     LTG 5 pt to demonstrate near level pelvic landmarks to facilitate optimal muscle balance allowing for ease with sitting/standing  -GR     LTG 5 Progress Ongoing  -GR       User Key  (r) = Recorded By, (t) = Taken By, (c) = Cosigned By    Initials  Name Provider Type    GR Mike Larson PT Physical Therapist          Therapy Education  Education Details: return to clinic 5/31  Given: Symptoms/condition management  Program: Reinforced  How Provided: Verbal  Provided to: Patient  Level of Understanding: Teach back education performed              Time Calculation:   Start Time: 1030  Stop Time: 1050  Time Calculation (min): 20 min  Total Timed Code Minutes- PT: 0 minute(s)    Therapy Charges for Today     Code Description Service Date Service Provider Modifiers Qty    85682869832 HC PT-TRACTION MECHANICAL 5/30/2018 Mike Larson, PT  1    31638635297 HC PT HOT OR COLD PACK TREAT MCARE 5/30/2018 Mike Larson PT GP 1                    Mike Larson, PT  5/30/2018

## 2018-05-30 NOTE — PROGRESS NOTES
"Blood in Urine; Fever (x 1 day 100.1 @5 am today); and Chills      HPI  \"I am assuming it is a urinary tract infection\".  Had some chills and fever in middle of night 2 nights ago.  Was not having any urinary sx at the time.  [place] fine yesterday but then last night when got up to urinate noted some bloody urine, pretty signficant amount of blood. Had bloddy urine all night and then had temp and chills overnight.  Temp was 100.1 this AM.  No flank or back pain noted.  No abdominal or pelvic pain noted. . Notes today is just starting to \"feel like how you feel when you have a UTI\". Urine was bloody when went to bathroom in office today.   Currently on Neurontin, Meloxicam, and Tylenol for some \"sciatic nerve pain\" that Dr. Gutierrez gave her in past.       Review of Systems   Constitution: Positive for chills and fever.   Skin: Negative for rash.   Musculoskeletal: Negative for back pain.   Gastrointestinal: Negative for abdominal pain, diarrhea, nausea and vomiting.   Genitourinary: Positive for frequency (baseline, \"I go to the bathroom a lot\" ), hematuria and urgency. Negative for dysuria, hesitancy and pelvic pain.       The following portions of the patient's history were reviewed and updated as appropriate: allergies, current medications, past medical history and problem list.      Current Outpatient Prescriptions:   •  acetaminophen (TYLENOL) 500 MG tablet, Take 1,000 mg by mouth every day., Disp: , Rfl:   •  calcium carbonate EX (TUMS EX) 750 MG chewable tablet, Chew 750 mg 4 (four) times a day., Disp: , Rfl:   •  Calcium Carbonate-Vit D-Min (CALTRATE 600+D PLUS MINERALS PO), Take 1,600 Units by mouth daily., Disp: , Rfl:   •  Cholecalciferol (VITAMIN D3) 2000 UNITS capsule, Take  by mouth., Disp: , Rfl:   •  clobetasol (TEMOVATE) 0.05 % ointment, Apply  topically Every Other Day., Disp: 15 g, Rfl: 1  •  denosumab (PROLIA) 60 MG/ML solution syringe, Inject 60 mg under the skin every 6 (six) months., Disp: , Rfl: " "  •  Flaxseed, Linseed, (FLAXSEED OIL) 1000 MG capsule, Take  by mouth., Disp: , Rfl:   •  gabapentin (NEURONTIN) 300 MG capsule, Take 1 capsule by mouth 3 (Three) Times a Day., Disp: 90 capsule, Rfl: 1  •  levothyroxine (SYNTHROID, LEVOTHROID) 75 MCG tablet, Take  by mouth., Disp: , Rfl:   •  meloxicam (MOBIC) 15 MG tablet, Take 1 tablet by mouth Daily., Disp: 30 tablet, Rfl: 3  •  Multiple Vitamin (MULTIVITAMINS PO), Take  by mouth., Disp: , Rfl:   •  omeprazole (PRILOSEC) 20 MG capsule, Take 1 capsule by mouth Daily., Disp: 30 capsule, Rfl: 2  •  polycarbophil (FIBERCON) 625 MG tablet, Take 625 mg by mouth daily., Disp: , Rfl:   •  Probiotic Product (PROBIOTIC DAILY PO), , Disp: , Rfl:   •  vitamin B-12 (CYANOCOBALAMIN) 1000 MCG tablet, Take  by mouth., Disp: , Rfl:   •  ciprofloxacin (CIPRO) 500 MG tablet, Take 1 tablet by mouth Every 12 (Twelve) Hours for 7 days., Disp: 14 tablet, Rfl: 0    Vitals:    05/30/18 1142   BP: 116/82   Pulse: 79   Temp: 98.6 °F (37 °C)   SpO2: 100%   Weight: 49.9 kg (110 lb)   Height: 157.5 cm (62\")         Physical Exam   Constitutional: She is oriented to person, place, and time. She appears well-developed and well-nourished. No distress.   HENT:   Head: Normocephalic and atraumatic.   Mouth/Throat: Oropharynx is clear and moist. No oropharyngeal exudate.   Eyes: Pupils are equal, round, and reactive to light.   Neck: Normal range of motion. Neck supple.   Cardiovascular: Normal rate, regular rhythm and normal heart sounds.    Pulmonary/Chest: Effort normal and breath sounds normal. No respiratory distress. She has no wheezes. She has no rales.   Abdominal: Soft. Bowel sounds are normal. She exhibits no distension and no mass. There is no tenderness. There is no rebound, no guarding and no CVA tenderness.   Lymphadenopathy:     She has no cervical adenopathy.   Neurological: She is alert and oriented to person, place, and time. No cranial nerve deficit. She exhibits normal muscle " tone. Gait normal.   Skin: No rash (over abdomen) noted.   Psychiatric: She has a normal mood and affect. Her behavior is normal.   Vitals reviewed.      Results for orders placed or performed in visit on 05/30/18   POC Urinalysis Dipstick, Automated   Result Value Ref Range    Color Dark Yellow Yellow, Straw, Dark Yellow, Shyann    Clarity, UA Cloudy (A) Clear    Specific Gravity  1.005 1.005 - 1.030    pH, Urine 7.0 5.0 - 8.0    Leukocytes Large (3+) (A) Negative    Nitrite, UA Negative Negative    Protein, POC 1+ (A) Negative mg/dL    Glucose, UA Negative Negative, 1000 mg/dL (3+) mg/dL    Ketones, UA Negative Negative    Urobilinogen, UA Normal Normal    Bilirubin Negative Negative    Blood, UA 3+ (A) Negative       Assessment/ Plan  Diagnoses and all orders for this visit:    Acute cystitis with hematuria  -     POC Urinalysis Dipstick, Automated  -     ciprofloxacin (CIPRO) 500 MG tablet; Take 1 tablet by mouth Every 12 (Twelve) Hours for 7 days.  -     Urine Culture - Urine, Urine, Clean Catch    Hematuria, unspecified type  -     POC Urinalysis Dipstick, Automated  -     ciprofloxacin (CIPRO) 500 MG tablet; Take 1 tablet by mouth Every 12 (Twelve) Hours for 7 days.        Return for Next scheduled follow up.      Discussion:  Eve Hurley is a 68 y.o. female RTC in acute care (new pt and issue to examiner) with 2 days of intermittent fevers, hematuria, and some progressive urinary urgency.  Pt exam is largely benign and her pain pattern, despite being on Mobic, gabapentin, and Tylenol for sciatic issues, does not suggest nephrolithiasis as etiology.  NO CVA TTP to suggest pyelonephritis. Will empirically tx with Cipro 500mg BID (last Cr 0.97) noting her (+) Udip for blood and pyuria today in office as well as her sulfa and PCN allergy notation.  Will f/u with pt after U cx returns.  Pt to call if fever curve progressive, progressive blood loss, new pain pattern, or other concerns.

## 2018-05-31 ENCOUNTER — HOSPITAL ENCOUNTER (OUTPATIENT)
Dept: PHYSICAL THERAPY | Facility: HOSPITAL | Age: 68
Setting detail: THERAPIES SERIES
Discharge: HOME | End: 2018-05-31

## 2018-05-31 ENCOUNTER — TELEPHONE (OUTPATIENT)
Dept: INTERNAL MEDICINE | Facility: CLINIC | Age: 68
End: 2018-05-31

## 2018-05-31 DIAGNOSIS — M54.5 LEFT LOW BACK PAIN, UNSPECIFIED CHRONICITY, WITH SCIATICA PRESENCE UNSPECIFIED: Primary | ICD-10-CM

## 2018-05-31 DIAGNOSIS — M79.10 TRIGGER POINT: ICD-10-CM

## 2018-05-31 PROCEDURE — 97012 MECHANICAL TRACTION THERAPY: CPT | Performed by: PHYSICAL THERAPIST

## 2018-06-03 LAB
BACTERIA UR CULT: ABNORMAL
BACTERIA UR CULT: ABNORMAL
OTHER ANTIBIOTIC SUSC ISLT: ABNORMAL

## 2018-06-04 ENCOUNTER — HOSPITAL ENCOUNTER (OUTPATIENT)
Dept: PHYSICAL THERAPY | Facility: HOSPITAL | Age: 68
Setting detail: THERAPIES SERIES
Discharge: HOME | End: 2018-06-04

## 2018-06-04 DIAGNOSIS — M79.10 TRIGGER POINT: ICD-10-CM

## 2018-06-04 DIAGNOSIS — M54.5 LEFT LOW BACK PAIN, UNSPECIFIED CHRONICITY, WITH SCIATICA PRESENCE UNSPECIFIED: Primary | ICD-10-CM

## 2018-06-04 PROCEDURE — 97012 MECHANICAL TRACTION THERAPY: CPT | Performed by: PHYSICAL THERAPIST

## 2018-06-04 NOTE — THERAPY TREATMENT NOTE
Outpatient Physical Therapy Ortho Treatment Note  [facility]     Patient Name: Eve Hurley  : 1950  MRN: [id number]  Today's Date: 2018      Visit Date: 2018    Visit Dx:    ICD-10-CM ICD-9-CM   1. Left low back pain, unspecified chronicity, with sciatica presence unspecified M54.5 724.2   2. Trigger point M79.1 729.1       Patient Active Problem List   Diagnosis   • Hypothyroidism   • Arthralgia of hip   • Osteoporosis   • Hearing difficulty   • Rectal bleeding   • Diverticulitis of intestine without perforation or abscess without bleeding   • Senile osteoporosis   • Abnormal CT of the abdomen   • Buttock trauma   • Hematoma        Past Medical History:   Diagnosis Date   • Actinic keratoses    • Anemia    • Atypical chest pain 2009    SEEN AT [facility] ER   • DDD (degenerative disc disease), cervical    • Disease of thyroid gland     HYPOTHYROIDISM   • Disorder of cecum 2017    CT OF ABD AND PELVIS AT [facility] SHOWED THICKENING IN CECUM, POSSIBLE MASS   • Diverticulitis     MOST RECENTLY ON 17, HAS HAD 4-5 EPISODES IN PAST 20 YEARS.    • Fall    • Fibrocystic breast    • Hearing difficulty    • Hip joint pain    • Hypertension    • Left lower quadrant pain 2004    CT SCAN AT [facility] SHOWED ESSENTIALLY NORMAL EXCEPT 2 VERY SMALL HEPATIC CYSTS AND CYSTIC STRUCTURE ON THE LEFT SIDE OF OF THE PELVIS REPRESENTING A 2CM OVARIAN CYST   • Lower extremity pain, left 06/15/2012    VENOUS DOPLER AT [facility] WAS WNL   • Osteoporosis     GETS PROLIA INJECTIONS   • Perforated ear drum 2014    MICROPERFORATION, LEFT EAR, SAW DR. GREGORY LAGOS   • PONV (postoperative nausea and vomiting)    • Rectal bleed 03/15/2016   • Renal insufficiency 2016   • Vegetarian     VEGAN        Past Surgical History:   Procedure Laterality Date   • BREAST SURGERY Right     CYST ASPIRATION   •  SECTION N/A    • COLONOSCOPY N/A 2004    DIVERTICULOSIS, REDUNDANT SIGMOID, DR. KELLEN ROBERTS AT  [place]   • COLONOSCOPY N/A 8/30/2017    Procedure: COLONOSCOPY INTO CECUM;  Surgeon: Marito Dunbar MD;  Location: [facility] ENDOSCOPY;  Service:    • HEMORRHOIDECTOMY N/A 11/13/2009    DR. KELLEN ROBERTS AT [place]   • TUBAL ABDOMINAL LIGATION Bilateral 1986                             PT Assessment/Plan     Row Name 06/04/18 0849          PT Assessment    Assessment Comments Ms. Hurley is leaving tomorrow for several week trip to [facility].  She reports 1/10 pain today and feeling generally good.  We repeated traction, reviewed idea of home traction if she feels she may need it when she returns from her trip.    -GJ        PT Plan    PT Plan Comments Ms. Hurley is leaving the country for several weeks.  Will assess need for therapy vs. home independent management once she returns.    -GJ       User Key  (r) = Recorded By, (t) = Taken By, (c) = Cosigned By    Initials Name Provider Type    LUIS ALFREDO Purcell, PT Physical Therapist                Modalities     Row Name 06/04/18 0800             Moist Heat    MH Applied Yes  -GJ      Location MH to L/T spine, pt. supine on traction   -GJ      Rx Minutes --   20 min  -GJ         Traction 72980    Traction Type Lumbar  -GJ      Rx Minutes 20 min  -GJ      Duration Intermittent  -GJ      Position Hook-lying  -GJ      Weight 50   /30  -GJ      Hold 45  -GJ      Relax 15  -GJ        User Key  (r) = Recorded By, (t) = Taken By, (c) = Cosigned By    Initials Name Provider Type    LUIS ALFREDO Purcell, PT Physical Therapist                Exercises     Row Name 06/04/18 0800             Subjective Comments    Subjective Comments I leave for [place] tomorrow.  -GJ        User Key  (r) = Recorded By, (t) = Taken By, (c) = Cosigned By    Initials Name Provider Type    LUIS ALFREDO Purcell, PT Physical Therapist                               PT OP Goals     Row Name 06/04/18 0800          PT Short Term Goals    STG Date to Achieve 06/12/18  -GJ     STG 1 pt. to be I with initial HEP to facilitate  self management of their condition  -GJ     STG 1 Progress Met  -GJ     STG 2 pt. to be educated in/verbalize understanding of the importance of posture/ergonomics in association with their condition to facilitate self management of their condition  -GJ     STG 2 Progress Met  -GJ     STG 3 pt to report no sleep disturbance secondary to LBP to facilitate optimal recovery  -GJ     STG 3 Progress Met  -GJ        Long Term Goals    LTG Date to Achieve 08/15/18  -GJ     LTG 1 pt. to be I with advanced HEP to facilitate self management of their condition  -GJ     LTG 1 Progress Partially Met  -GJ     LTG 2 pt. to report an Oswestry score </= 20% to demonstrate decreased level of perceived disability  -GJ     LTG 2 Progress Ongoing  -GJ     LTG 3 pt. to demonstrate L DF MMT 5/5 to facilitate ease/safety with functional mobility  -GJ     LTG 3 Progress Met  -GJ     LTG 4 pt to report </= 2/10 LBP/LLE pain with sittign >/= 1 hour to facilitate ease with functional activities/riding plane  -GJ     LTG 4 Progress Ongoing;Progressing;Partially Met  -GJ     LTG 5 pt to demonstrate near level pelvic landmarks to facilitate optimal muscle balance allowing for ease with sitting/standing  -GJ     LTG 5 Progress Ongoing  -       User Key  (r) = Recorded By, (t) = Taken By, (c) = Cosigned By    Initials Name Provider Type     Dante Purcell PT Physical Therapist          Therapy Education  Education Details: reviwed activity modifications bernardo. for long plane rides etc  Given: Pain management, Posture/body mechanics  Program: Reinforced  How Provided: Verbal  Provided to: Patient  Level of Understanding: Verbalized              Time Calculation:   Start Time: 0829  Stop Time: 0915  Time Calculation (min): 46 min    Therapy Charges for Today     Code Description Service Date Service Provider Modifiers Qty    11682733558  PT HOT OR COLD PACK TREAT MCARE 6/4/2018 Dante Purcell, PT GP 1    [id number] HC PT TRACTION LUMBAR  6/4/2018 Dante Purcell, PT GP 1                    Dante Purcell, PT  6/4/2018

## 2018-07-02 RX ORDER — GABAPENTIN 100 MG/1
CAPSULE ORAL
Qty: 90 CAPSULE | Refills: 0 | Status: SHIPPED | OUTPATIENT
Start: 2018-07-02 | End: 2018-07-03 | Stop reason: SDUPTHER

## 2018-07-03 ENCOUNTER — OFFICE VISIT (OUTPATIENT)
Dept: INTERNAL MEDICINE | Facility: CLINIC | Age: 68
End: 2018-07-03

## 2018-07-03 VITALS
BODY MASS INDEX: 19.94 KG/M2 | SYSTOLIC BLOOD PRESSURE: 130 MMHG | WEIGHT: 109 LBS | DIASTOLIC BLOOD PRESSURE: 70 MMHG | HEART RATE: 64 BPM | OXYGEN SATURATION: 98 %

## 2018-07-03 DIAGNOSIS — E03.9 HYPOTHYROIDISM, UNSPECIFIED TYPE: ICD-10-CM

## 2018-07-03 DIAGNOSIS — M54.42 CHRONIC LEFT-SIDED LOW BACK PAIN WITH LEFT-SIDED SCIATICA: Primary | ICD-10-CM

## 2018-07-03 DIAGNOSIS — G89.29 CHRONIC LEFT-SIDED LOW BACK PAIN WITH LEFT-SIDED SCIATICA: Primary | ICD-10-CM

## 2018-07-03 PROCEDURE — 99213 OFFICE O/P EST LOW 20 MIN: CPT | Performed by: INTERNAL MEDICINE

## 2018-07-03 RX ORDER — GABAPENTIN 100 MG/1
100 CAPSULE ORAL 3 TIMES DAILY
Qty: 90 CAPSULE | Refills: 4 | Status: SHIPPED | OUTPATIENT
Start: 2018-07-03 | End: 2019-04-15

## 2018-08-24 ENCOUNTER — DOCUMENTATION (OUTPATIENT)
Dept: PHYSICAL THERAPY | Facility: HOSPITAL | Age: 68
End: 2018-08-24

## 2018-08-24 DIAGNOSIS — M79.10 TRIGGER POINT: ICD-10-CM

## 2018-08-24 DIAGNOSIS — M54.5 LEFT LOW BACK PAIN, UNSPECIFIED CHRONICITY, WITH SCIATICA PRESENCE UNSPECIFIED: Primary | ICD-10-CM

## 2018-08-24 NOTE — THERAPY DISCHARGE NOTE
Outpatient Physical Therapy Ortho Discharge Summary       Patient Name: Eve Hurley  : 1950  MRN: [id number]  Today's Date: 2018      Visit Date: 2018    Visit Dx:    ICD-10-CM ICD-9-CM   1. Left low back pain, unspecified chronicity, with sciatica presence unspecified M54.5 724.2   2. Trigger point M79.1 729.1       Patient Active Problem List   Diagnosis   • Hypothyroidism   • Arthralgia of hip   • Osteoporosis   • Hearing difficulty   • Rectal bleeding   • Diverticulitis of intestine without perforation or abscess without bleeding   • Senile osteoporosis   • Abnormal CT of the abdomen   • Buttock trauma   • Hematoma        Past Medical History:   Diagnosis Date   • Actinic keratoses    • Anemia    • Atypical chest pain 2009    SEEN AT [facility] ER   • DDD (degenerative disc disease), cervical    • Disease of thyroid gland     HYPOTHYROIDISM   • Disorder of cecum 2017    CT OF ABD AND PELVIS AT [facility] SHOWED THICKENING IN CECUM, POSSIBLE MASS   • Diverticulitis     MOST RECENTLY ON 19, HAS HAD 4-5 EPISODES IN PAST 20 YEARS.    • Fall    • Fibrocystic breast    • Hearing difficulty    • Hip joint pain    • Hypertension    • Left lower quadrant pain 2004    CT SCAN AT [facility] SHOWED ESSENTIALLY NORMAL EXCEPT 2 VERY SMALL HEPATIC CYSTS AND CYSTIC STRUCTURE ON THE LEFT SIDE OF OF THE PELVIS REPRESENTING A 2CM OVARIAN CYST   • Lower extremity pain, left 06/15/2012    VENOUS DOPLER AT [facility] WAS WNL   • Osteoporosis     GETS PROLIA INJECTIONS   • Perforated ear drum 2014    MICROPERFORATION, LEFT EAR, SAW DR. GREGORY LAGOS   • PONV (postoperative nausea and vomiting)    • Rectal bleed 03/15/2016   • Renal insufficiency 2016   • Vegetarian     VEGAN        Past Surgical History:   Procedure Laterality Date   • BREAST SURGERY Right     CYST ASPIRATION   •  SECTION N/A    • COLONOSCOPY N/A 2004    DIVERTICULOSIS, REDUNDANT SIGMOID, DR. KELLEN ROBERTS AT [place]    • COLONOSCOPY N/A 8/30/2017    Procedure: COLONOSCOPY INTO CECUM;  Surgeon: Marito Dunbar MD;  Location: [facility] ENDOSCOPY;  Service:    • HEMORRHOIDECTOMY N/A 11/13/2009    DR. KELLEN ROBERTS AT [place]   • TUBAL ABDOMINAL LIGATION Bilateral 1986                                                                     Time Calculation:      Therapy Suggested Charges     Code   Minutes Charges    None                     OP PT Discharge Summary  Date of Discharge: 08/24/18  Reason for Discharge: Patient/Caregiver request  Outcomes Achieved: Patient able to partially acheive established goals  Discharge Destination: Home with home program  Discharge Instructions/Additional Comments: Patient did not return to PT after trip to [facility]. Her current status is unknown; she did make good progress during PT. This encounter is now closed.      Mike Larson, PT  8/24/2018

## 2018-11-12 ENCOUNTER — INFUSION (OUTPATIENT)
Dept: ONCOLOGY | Facility: HOSPITAL | Age: 68
End: 2018-11-12

## 2018-11-12 VITALS
OXYGEN SATURATION: 97 % | WEIGHT: 110.2 LBS | HEART RATE: 75 BPM | RESPIRATION RATE: 16 BRPM | BODY MASS INDEX: 20.28 KG/M2 | TEMPERATURE: 97.6 F | SYSTOLIC BLOOD PRESSURE: 129 MMHG | HEIGHT: 62 IN | DIASTOLIC BLOOD PRESSURE: 81 MMHG

## 2018-11-12 DIAGNOSIS — M81.0 AGE-RELATED OSTEOPOROSIS WITHOUT CURRENT PATHOLOGICAL FRACTURE: Primary | ICD-10-CM

## 2018-11-12 LAB
ANION GAP SERPL CALCULATED.3IONS-SCNC: 10.3 MMOL/L
BUN BLD-MCNC: 14 MG/DL (ref 8–23)
BUN/CREAT SERPL: 15.9 (ref 7–25)
CALCIUM SPEC-SCNC: 9.4 MG/DL (ref 8.6–10.5)
CHLORIDE SERPL-SCNC: 102 MMOL/L (ref 98–107)
CO2 SERPL-SCNC: 30.7 MMOL/L (ref 22–29)
CREAT BLD-MCNC: 0.88 MG/DL (ref 0.57–1)
GFR SERPL CREATININE-BSD FRML MDRD: 64 ML/MIN/1.73
GLUCOSE BLD-MCNC: 92 MG/DL (ref 65–99)
POTASSIUM BLD-SCNC: 4 MMOL/L (ref 3.5–5.2)
SODIUM BLD-SCNC: 143 MMOL/L (ref 136–145)

## 2018-11-12 PROCEDURE — 25010000002 DENOSUMAB 60 MG/ML SOLUTION: Performed by: INTERNAL MEDICINE

## 2018-11-12 PROCEDURE — 80048 BASIC METABOLIC PNL TOTAL CA: CPT | Performed by: INTERNAL MEDICINE

## 2018-11-12 PROCEDURE — 96372 THER/PROPH/DIAG INJ SC/IM: CPT | Performed by: NURSE PRACTITIONER

## 2018-11-12 RX ADMIN — DENOSUMAB 60 MG: 60 INJECTION SUBCUTANEOUS at 15:36

## 2018-11-12 NOTE — PROGRESS NOTES
Arrived ambulatory . Labs and medications reviewed with patient.Prolia injection given per doctor order and tolerated well. Instructed to call her doctor for any concern and to schedule next appointment in 6 months. D/C home ambulatory.

## 2019-04-10 DIAGNOSIS — Z00.00 ANNUAL PHYSICAL EXAM: Primary | ICD-10-CM

## 2019-04-10 DIAGNOSIS — E03.9 HYPOTHYROIDISM, UNSPECIFIED TYPE: ICD-10-CM

## 2019-04-10 DIAGNOSIS — M81.0 AGE-RELATED OSTEOPOROSIS WITHOUT CURRENT PATHOLOGICAL FRACTURE: ICD-10-CM

## 2019-04-11 LAB
25(OH)D3+25(OH)D2 SERPL-MCNC: 47 NG/ML (ref 30–100)
ALBUMIN SERPL-MCNC: 4.1 G/DL (ref 3.5–5.2)
ALBUMIN/GLOB SERPL: 1.4 G/DL
ALP SERPL-CCNC: 30 U/L (ref 39–117)
ALT SERPL-CCNC: 12 U/L (ref 1–33)
APPEARANCE UR: CLEAR
AST SERPL-CCNC: 18 U/L (ref 1–32)
BACTERIA #/AREA URNS HPF: NORMAL /HPF
BASOPHILS # BLD AUTO: 0.06 10*3/MM3 (ref 0–0.2)
BASOPHILS NFR BLD AUTO: 1 % (ref 0–1.5)
BILIRUB SERPL-MCNC: 0.2 MG/DL (ref 0.2–1.2)
BILIRUB UR QL STRIP: NEGATIVE
BUN SERPL-MCNC: 15 MG/DL (ref 8–23)
BUN/CREAT SERPL: 17.4 (ref 7–25)
CALCIUM SERPL-MCNC: 9.5 MG/DL (ref 8.6–10.5)
CHLORIDE SERPL-SCNC: 101 MMOL/L (ref 98–107)
CHOLEST SERPL-MCNC: 185 MG/DL (ref 0–200)
CO2 SERPL-SCNC: 26 MMOL/L (ref 22–29)
COLOR UR: YELLOW
CREAT SERPL-MCNC: 0.86 MG/DL (ref 0.57–1)
EOSINOPHIL # BLD AUTO: 0.22 10*3/MM3 (ref 0–0.4)
EOSINOPHIL NFR BLD AUTO: 3.8 % (ref 0.3–6.2)
EPI CELLS #/AREA URNS HPF: NORMAL /HPF
ERYTHROCYTE [DISTWIDTH] IN BLOOD BY AUTOMATED COUNT: 12.9 % (ref 12.3–15.4)
GLOBULIN SER CALC-MCNC: 3 GM/DL
GLUCOSE SERPL-MCNC: 85 MG/DL (ref 65–99)
GLUCOSE UR QL: NEGATIVE
HCT VFR BLD AUTO: 42.6 % (ref 34–46.6)
HDLC SERPL-MCNC: 104 MG/DL (ref 40–60)
HGB BLD-MCNC: 13.1 G/DL (ref 12–15.9)
HGB UR QL STRIP: NEGATIVE
IMM GRANULOCYTES # BLD AUTO: 0.02 10*3/MM3 (ref 0–0.05)
IMM GRANULOCYTES NFR BLD AUTO: 0.3 % (ref 0–0.5)
KETONES UR QL STRIP: NEGATIVE
LDLC SERPL CALC-MCNC: 68 MG/DL (ref 0–100)
LEUKOCYTE ESTERASE UR QL STRIP: NEGATIVE
LYMPHOCYTES # BLD AUTO: 2.1 10*3/MM3 (ref 0.7–3.1)
LYMPHOCYTES NFR BLD AUTO: 36.1 % (ref 19.6–45.3)
MCH RBC QN AUTO: 30.9 PG (ref 26.6–33)
MCHC RBC AUTO-ENTMCNC: 30.8 G/DL (ref 31.5–35.7)
MCV RBC AUTO: 100.5 FL (ref 79–97)
MICRO URNS: NORMAL
MICRO URNS: NORMAL
MONOCYTES # BLD AUTO: 0.55 10*3/MM3 (ref 0.1–0.9)
MONOCYTES NFR BLD AUTO: 9.5 % (ref 5–12)
NEUTROPHILS # BLD AUTO: 2.87 10*3/MM3 (ref 1.4–7)
NEUTROPHILS NFR BLD AUTO: 49.3 % (ref 42.7–76)
NITRITE UR QL STRIP: NEGATIVE
NRBC BLD AUTO-RTO: 0 /100 WBC (ref 0–0)
PH UR STRIP: 6.5 [PH] (ref 5–7.5)
PLATELET # BLD AUTO: 330 10*3/MM3 (ref 140–450)
POTASSIUM SERPL-SCNC: 4.1 MMOL/L (ref 3.5–5.2)
PROT SERPL-MCNC: 7.1 G/DL (ref 6–8.5)
PROT UR QL STRIP: NEGATIVE
RBC # BLD AUTO: 4.24 10*6/MM3 (ref 3.77–5.28)
RBC #/AREA URNS HPF: NORMAL /HPF
SODIUM SERPL-SCNC: 141 MMOL/L (ref 136–145)
SP GR UR: 1.01 (ref 1–1.03)
T4 FREE SERPL-MCNC: NORMAL NG/DL
TRIGL SERPL-MCNC: 67 MG/DL (ref 0–150)
TSH SERPL DL<=0.005 MIU/L-ACNC: 1.11 MIU/ML (ref 0.27–4.2)
URINALYSIS REFLEX: NORMAL
UROBILINOGEN UR STRIP-MCNC: 0.2 MG/DL (ref 0.2–1)
VLDLC SERPL CALC-MCNC: 13.4 MG/DL (ref 5–40)
WBC # BLD AUTO: 5.82 10*3/MM3 (ref 3.4–10.8)
WBC #/AREA URNS HPF: NORMAL /HPF

## 2019-04-15 ENCOUNTER — OFFICE VISIT (OUTPATIENT)
Dept: INTERNAL MEDICINE | Facility: CLINIC | Age: 69
End: 2019-04-15

## 2019-04-15 VITALS
HEART RATE: 71 BPM | OXYGEN SATURATION: 98 % | DIASTOLIC BLOOD PRESSURE: 74 MMHG | HEIGHT: 61 IN | BODY MASS INDEX: 20.96 KG/M2 | SYSTOLIC BLOOD PRESSURE: 124 MMHG | WEIGHT: 111 LBS

## 2019-04-15 DIAGNOSIS — Z00.00 HEALTHCARE MAINTENANCE: Primary | ICD-10-CM

## 2019-04-15 DIAGNOSIS — Z12.4 CERVICAL CANCER SCREENING: ICD-10-CM

## 2019-04-15 DIAGNOSIS — M81.0 AGE-RELATED OSTEOPOROSIS WITHOUT CURRENT PATHOLOGICAL FRACTURE: ICD-10-CM

## 2019-04-15 DIAGNOSIS — E03.9 HYPOTHYROIDISM, UNSPECIFIED TYPE: ICD-10-CM

## 2019-04-15 DIAGNOSIS — Z12.31 ENCOUNTER FOR SCREENING MAMMOGRAM FOR BREAST CANCER: ICD-10-CM

## 2019-04-15 PROCEDURE — 90732 PPSV23 VACC 2 YRS+ SUBQ/IM: CPT | Performed by: INTERNAL MEDICINE

## 2019-04-15 PROCEDURE — 93000 ELECTROCARDIOGRAM COMPLETE: CPT | Performed by: INTERNAL MEDICINE

## 2019-04-15 PROCEDURE — G0439 PPPS, SUBSEQ VISIT: HCPCS | Performed by: INTERNAL MEDICINE

## 2019-04-15 PROCEDURE — 96160 PT-FOCUSED HLTH RISK ASSMT: CPT | Performed by: INTERNAL MEDICINE

## 2019-04-15 PROCEDURE — G0009 ADMIN PNEUMOCOCCAL VACCINE: HCPCS | Performed by: INTERNAL MEDICINE

## 2019-04-15 NOTE — PROGRESS NOTES
Subjective   AWV  Hypothyroidism  Osteoporosis    Eve Hurley is a 69 y.o. female who presents for an annual wellness visit. She is doing well today. She has hypothyroidism and is clinically euthyroid. She has osteoporosis. She is tolerating prolia injections and her t score is improving both in hip and spinal parameters.         Review of Systems   Constitutional: Negative.    HENT: Negative.    Eyes: Negative.    Respiratory: Negative.    Cardiovascular: Negative.    Gastrointestinal: Negative.    Endocrine: Negative.    Genitourinary: Negative.    Musculoskeletal: Negative.    Skin: Negative.    Allergic/Immunologic: Negative.    Neurological: Negative.    Hematological: Negative.    Psychiatric/Behavioral: Negative.        The following portions of the patient's history were reviewed and updated as appropriate: allergies, current medications, past family history, past medical history, past social history, past surgical history and problem list.     Patient Active Problem List   Diagnosis   • Hypothyroidism   • Arthralgia of hip   • Osteoporosis   • Hearing difficulty   • Rectal bleeding   • Diverticulitis of intestine without perforation or abscess without bleeding   • Senile osteoporosis   • Abnormal CT of the abdomen   • Buttock trauma   • Hematoma       Past Medical History:   Diagnosis Date   • Actinic keratoses    • Anemia    • Atypical chest pain 12/26/2009    SEEN AT [facility] ER   • DDD (degenerative disc disease), cervical    • Disease of thyroid gland     HYPOTHYROIDISM   • Disorder of cecum 07/18/2017    CT OF ABD AND PELVIS AT [facility] SHOWED THICKENING IN CECUM, POSSIBLE MASS   • Diverticulitis     MOST RECENTLY ON 7-8-17, HAS HAD 4-5 EPISODES IN PAST 20 YEARS.    • Fall    • Fibrocystic breast    • Hearing difficulty    • Hip joint pain    • Hypertension    • Left lower quadrant pain 07/14/2004    CT SCAN AT [facility] SHOWED ESSENTIALLY NORMAL EXCEPT 2 VERY SMALL HEPATIC CYSTS AND CYSTIC STRUCTURE ON THE LEFT  SIDE OF OF THE PELVIS REPRESENTING A 2CM OVARIAN CYST   • Lower extremity pain, left 06/15/2012    VENOUS DOPLER AT [facility] WAS WNL   • Osteoporosis     GETS PROLIA INJECTIONS   • Perforated ear drum 2014    MICROPERFORATION, LEFT EAR, SAW DR. GREGORY LAGOS   • PONV (postoperative nausea and vomiting)    • Rectal bleed 03/15/2016   • Renal insufficiency 2016   • Vegetarian     VEGAN       Past Surgical History:   Procedure Laterality Date   • BREAST SURGERY Right     CYST ASPIRATION   •  SECTION N/A    • COLONOSCOPY N/A 2004    DIVERTICULOSIS, REDUNDANT SIGMOID, DR. KELLEN ROBERTS AT [place]   • COLONOSCOPY N/A 2017    Procedure: COLONOSCOPY INTO CECUM;  Surgeon: Marito Dunbar MD;  Location: [facility] ENDOSCOPY;  Service:    • HEMORRHOIDECTOMY N/A 2009    DR. KELLEN ROBERTS AT [place]   • TUBAL ABDOMINAL LIGATION Bilateral        Family History   Problem Relation Age of Onset   • Coronary artery disease Father    • Heart disease Father    • Hypertension Father    • Arthritis Father    • Alcohol abuse Father    • Hyperthyroidism Brother    • Thyroid disease Brother    • Diabetes Daughter    • Coronary artery disease Maternal Grandmother    • Arthritis Maternal Grandmother    • Asthma Maternal Grandmother    • ALS Mother    • Depression Mother    • Early death Mother    • Thyroid disease Sister    • Heart disease Maternal Grandfather    • Alcohol abuse Maternal Grandfather    • Arthritis Paternal Grandmother    • Diabetes Paternal Grandmother    • Alcohol abuse Paternal Grandfather    • Macular degeneration Paternal Grandfather    • Heart attack Paternal Grandfather    • Alcohol abuse Sister    • Depression Sister        Social History     Socioeconomic History   • Marital status:      Spouse name: Not on file   • Number of children: Not on file   • Years of education: Not on file   • Highest education level: Not on file   Tobacco Use   • Smoking status: Former Smoker      Packs/day: 1.00     Years: 15.00     Pack years: 15.00     Types: Cigarettes   • Smokeless tobacco: Never Used   Substance and Sexual Activity   • Alcohol use: Yes     Alcohol/week: 1.2 oz     Types: 2 Glasses of wine per week   • Drug use: No   • Sexual activity: Not Currently       Current Outpatient Medications on File Prior to Visit   Medication Sig Dispense Refill   • acetaminophen (TYLENOL) 500 MG tablet Take 1,000 mg by mouth every day.     • calcium carbonate EX (TUMS EX) 750 MG chewable tablet Chew 750 mg 4 (four) times a day.     • Flaxseed, Linseed, (FLAXSEED OIL) 1000 MG capsule Take  by mouth.     • levothyroxine (SYNTHROID, LEVOTHROID) 75 MCG tablet Take  by mouth.     • polycarbophil (FIBERCON) 625 MG tablet Take 625 mg by mouth daily.     • Probiotic Product (PROBIOTIC DAILY PO)      • vitamin B-12 (CYANOCOBALAMIN) 1000 MCG tablet Take  by mouth.     • [DISCONTINUED] Calcium Carbonate-Vit D-Min (CALTRATE 600+D PLUS MINERALS PO) Take 1,600 Units by mouth daily.     • [DISCONTINUED] Multiple Vitamin (MULTIVITAMINS PO) Take  by mouth.     • denosumab (PROLIA) 60 MG/ML solution syringe Inject 60 mg under the skin every 6 (six) months.     • [DISCONTINUED] Cholecalciferol (VITAMIN D3) 2000 UNITS capsule Take  by mouth.     • [DISCONTINUED] clobetasol (TEMOVATE) 0.05 % ointment Apply  topically Every Other Day. 15 g 1   • [DISCONTINUED] gabapentin (NEURONTIN) 100 MG capsule Take 1 capsule by mouth 3 (Three) Times a Day. 90 capsule 4   • [DISCONTINUED] omeprazole (PRILOSEC) 20 MG capsule Take 1 capsule by mouth Daily. 30 capsule 2     No current facility-administered medications on file prior to visit.        Allergies   Allergen Reactions   • Sulfa Antibiotics Swelling     SWOLLEN LIPS   • Penicillins Rash       Immunization History   Administered Date(s) Administered   • Flu Vaccine High Dose PF 65YR+ 09/18/2017   • Hepatitis A 09/10/2012, 04/04/2013   • Pneumococcal Conjugate 13-Valent (PCV13) 03/30/2018  "  • Tdap 09/10/2012   • Zostavax 09/10/2012       Objective     /74   Pulse 71   Ht 155.6 cm (61.25\")   Wt 50.3 kg (111 lb)   LMP  (LMP Unknown)   SpO2 98%   BMI 20.80 kg/m²     Physical Exam   Constitutional: She is oriented to person, place, and time. She appears well-developed and well-nourished.   HENT:   Head: Normocephalic and atraumatic.   Right Ear: Hearing, tympanic membrane and external ear normal.   Left Ear: Hearing, tympanic membrane and external ear normal.   Nose: Nose normal.   Mouth/Throat: Oropharynx is clear and moist.   Neck: Neck supple. No thyromegaly present.   Cardiovascular: Normal rate, regular rhythm and normal heart sounds.   No murmur heard.  Pulmonary/Chest: Effort normal and breath sounds normal. Right breast exhibits no mass. Left breast exhibits no mass.   Abdominal: Soft. She exhibits no distension. There is no hepatosplenomegaly. There is no tenderness.   Genitourinary: No breast tenderness.   Lymphadenopathy:     She has no cervical adenopathy.   Neurological: She is alert and oriented to person, place, and time.   Skin: Skin is warm and dry.   Psychiatric: She has a normal mood and affect. Her speech is normal and behavior is normal. Judgment and thought content normal. Cognition and memory are normal.       Assessment/Plan   Eve was seen today for annual exam.    Diagnoses and all orders for this visit:    Healthcare maintenance    Hypothyroidism, unspecified type  -     ECG 12 Lead    Age-related osteoporosis without current pathological fracture    Encounter for screening mammogram for breast cancer  -     Mammo screening digital tomosynthesis bilateral w CAD    Cervical cancer screening  -     Pap IG, HPV-hr    Other orders  -     Pneumococcal Polysaccharide Vaccine 23-Valent Greater Than or Equal To 3yo Subcutaneous / IM        Discussion    AWV.     Direct observation of cognitive ability was evaluated and is normal. Patient was given health advice or " handouts on the following:  nutrition, fall prevention, exercise, weight management  Patient is doing well today. She will continue synthroid daily. She will also continue routine activity with yoga. She is to get prolia twice a year. Dietary calcium and vit D. She will have pap testing today given HPV positive last year. She is advised a shingrix vaccination. Follow up in 1 year or prn.              Future Appointments   Date Time Provider Department Center   5/13/2019  1:30 PM REFERRED INJECTION CHAIR [facility]   4/16/2020  8:00 AM [facility] None   4/20/2020  7:30 AM Gladys Gutierrez MD [facility] None

## 2019-04-17 LAB
CYTOLOGIST CVX/VAG CYTO: NORMAL
CYTOLOGY CVX/VAG DOC THIN PREP: NORMAL
DX ICD CODE: NORMAL
HIV 1 & 2 AB SER-IMP: NORMAL
HPV I/H RISK 1 DNA CVX QL PROBE+SIG AMP: NEGATIVE
OTHER STN SPEC: NORMAL
PATH REPORT.FINAL DX SPEC: NORMAL
STAT OF ADQ CVX/VAG CYTO-IMP: NORMAL

## 2019-04-30 ENCOUNTER — APPOINTMENT (OUTPATIENT)
Dept: WOMENS IMAGING | Facility: HOSPITAL | Age: 69
End: 2019-04-30

## 2019-04-30 PROCEDURE — 77063 BREAST TOMOSYNTHESIS BI: CPT | Performed by: RADIOLOGY

## 2019-04-30 PROCEDURE — 77067 SCR MAMMO BI INCL CAD: CPT | Performed by: RADIOLOGY

## 2019-05-13 ENCOUNTER — INFUSION (OUTPATIENT)
Dept: ONCOLOGY | Facility: HOSPITAL | Age: 69
End: 2019-05-13

## 2019-05-13 VITALS
OXYGEN SATURATION: 100 % | TEMPERATURE: 97.7 F | BODY MASS INDEX: 20.84 KG/M2 | WEIGHT: 111.2 LBS | DIASTOLIC BLOOD PRESSURE: 81 MMHG | SYSTOLIC BLOOD PRESSURE: 126 MMHG | HEART RATE: 65 BPM

## 2019-05-13 DIAGNOSIS — M81.0 AGE-RELATED OSTEOPOROSIS WITHOUT CURRENT PATHOLOGICAL FRACTURE: Primary | ICD-10-CM

## 2019-05-13 PROCEDURE — 96372 THER/PROPH/DIAG INJ SC/IM: CPT | Performed by: NURSE PRACTITIONER

## 2019-05-13 PROCEDURE — 25010000002 DENOSUMAB 60 MG/ML SOLUTION PREFILLED SYRINGE: Performed by: INTERNAL MEDICINE

## 2019-05-13 RX ADMIN — DENOSUMAB 60 MG: 60 INJECTION SUBCUTANEOUS at 13:47

## 2019-05-13 NOTE — PROGRESS NOTES
Arrived ambulatory  for prolia injection. Indication and side effects reviewed. Denies recent dental work. Labs and medications verified. Prolia administered in  Left   arm without incidence. Instructed to call prescribing MD for any concerns or questions and instructed on how to schedule future appts.  Pt vu and discharged ambulatory.

## 2019-09-09 ENCOUNTER — OFFICE VISIT (OUTPATIENT)
Dept: SURGERY | Facility: CLINIC | Age: 69
End: 2019-09-09

## 2019-09-09 VITALS
SYSTOLIC BLOOD PRESSURE: 132 MMHG | TEMPERATURE: 98.7 F | BODY MASS INDEX: 20.39 KG/M2 | OXYGEN SATURATION: 98 % | HEIGHT: 61 IN | HEART RATE: 88 BPM | DIASTOLIC BLOOD PRESSURE: 80 MMHG | WEIGHT: 108 LBS

## 2019-09-09 DIAGNOSIS — R10.32 LLQ ABDOMINAL PAIN: Primary | ICD-10-CM

## 2019-09-09 PROCEDURE — 99213 OFFICE O/P EST LOW 20 MIN: CPT | Performed by: COLON & RECTAL SURGERY

## 2019-09-09 RX ORDER — AMOXICILLIN AND CLAVULANATE POTASSIUM 875; 125 MG/1; MG/1
1 TABLET, FILM COATED ORAL 2 TIMES DAILY
Qty: 20 TABLET | Refills: 0 | Status: SHIPPED | OUTPATIENT
Start: 2019-09-09 | End: 2019-09-19

## 2019-09-09 NOTE — PROGRESS NOTES
"Eve Hurley is a 69 y.o. female in for follow up of LLQ abdominal pain    Pt c/o LLQ abdominal pain beginning 2 nights ago; radiates across her abdomen  Worse after she ate dinner last night    She had temp 99.7 earlier today, and she took tylenol    Previously she had taken cipro and flagyl for diverticulitis.  She states she had trouble tolerating the flagyl    She has not had any issues with her BMs  She intermittently has blood per rectum, she she attributes to a hemorrhoid  No blood or mucus per rectum over the past few days    She also c/o pressure in her pelvis, similar to when she has a UTI    She was told she had a rash with PCN as a child    /80 (BP Location: Left arm, Patient Position: Sitting, Cuff Size: Adult)   Pulse 88   Temp 98.7 °F (37.1 °C) (Oral)   Ht 155.6 cm (61.25\")   Wt 49 kg (108 lb)   LMP  (LMP Unknown)   SpO2 98%   BMI 20.24 kg/m²   Body mass index is 20.24 kg/m².      PE:  Physical Exam   Constitutional: She appears well-developed. No distress.   HENT:   Head: Normocephalic and atraumatic.   Abdominal:   -soft. +ttp LLQ, non-distended  -no rebound, no guarding   Musculoskeletal: Normal range of motion.   Neurological: She is alert.   Psychiatric: Thought content normal.         Assessment:   1. LLQ abdominal pain         Plan:    Will order CT a/p for further evaluation of LLQ pain; we will call her with results.    Rx Augmentin in the meantime.  As she has not taken PCN in over 60 years, and she did not tolerate flagyl well in the past, will try Augmentin first.  She will call if any rash/other adverse reaction.    Discussed return precautions, including but not limited to: fever or chills, nausea or vomiting, increase in abdominal pain or pressure, change in bowel movements, blood per rectum, or any other concerning symptom    RTC 3 weeks      Scribed for Marito Dunbar MD by Rosa Jin PA-C  9/9/2019   This patient was evaluated by me, recommendations made, " documentation reviewed, edited, and revised by me, Marito Dunbar MD

## 2019-09-10 ENCOUNTER — APPOINTMENT (OUTPATIENT)
Dept: CT IMAGING | Facility: HOSPITAL | Age: 69
End: 2019-09-10

## 2019-09-11 ENCOUNTER — APPOINTMENT (OUTPATIENT)
Dept: CT IMAGING | Facility: HOSPITAL | Age: 69
End: 2019-09-11

## 2019-10-08 ENCOUNTER — OFFICE VISIT (OUTPATIENT)
Dept: SURGERY | Facility: CLINIC | Age: 69
End: 2019-10-08

## 2019-10-08 VITALS
OXYGEN SATURATION: 97 % | HEART RATE: 65 BPM | SYSTOLIC BLOOD PRESSURE: 118 MMHG | WEIGHT: 105 LBS | DIASTOLIC BLOOD PRESSURE: 88 MMHG | TEMPERATURE: 97.9 F | BODY MASS INDEX: 19.83 KG/M2 | HEIGHT: 61 IN

## 2019-10-08 DIAGNOSIS — R10.32 LLQ ABDOMINAL PAIN: Primary | ICD-10-CM

## 2019-10-08 PROCEDURE — 99212 OFFICE O/P EST SF 10 MIN: CPT | Performed by: COLON & RECTAL SURGERY

## 2019-10-08 NOTE — PROGRESS NOTES
"Eve Hurley is a 69 y.o. female in for follow up of LLQ abdominal pain    Pt decided not to have CT due to cost, and her abdominal pain had improved  She usually has episodes of the abdominal pain every 2-3 years    She finished Augmentin.  She did have some nausea with the last dose, but this resolved    No issues with her BMs    Most recent colonoscopy 2017: tics, no polyps    /88 (BP Location: Left arm, Patient Position: Sitting, Cuff Size: Adult)   Pulse 65   Temp 97.9 °F (36.6 °C) (Oral)   Ht 155.6 cm (61.25\")   Wt 47.6 kg (105 lb)   LMP  (LMP Unknown)   SpO2 97%   BMI 19.68 kg/m²   Body mass index is 19.68 kg/m².      PE:  Physical Exam   Constitutional: She appears well-developed. No distress.   HENT:   Head: Normocephalic and atraumatic.   Abdominal: Soft. She exhibits no distension.   Musculoskeletal: Normal range of motion.   Neurological: She is alert.   Psychiatric: Thought content normal.         Assessment:   1. LLQ abdominal pain         Plan:    Her abdominal pain has resolved with Augmentin.  If she begins to have recurrent episodes of diverticulitis, or if she has a complicated episode, will re-order CT.    In the meantime, I recommend she slowly increase the fiber content in her diet.  She can also continue to take an otc probiotic.    Call or come in if any recurrence of symptoms, or if any questions or concerns    RTC prn      Scribed for Marito Dunbar MD by Rosa Jin PA-C  10/8/2019   This patient was evaluated by me, recommendations made, documentation reviewed, edited, and revised by me, Marito Dunbar MD        "

## 2019-10-30 DIAGNOSIS — M81.0 AGE-RELATED OSTEOPOROSIS WITHOUT CURRENT PATHOLOGICAL FRACTURE: Primary | ICD-10-CM

## 2019-11-09 LAB
BUN SERPL-MCNC: 11 MG/DL (ref 8–23)
BUN/CREAT SERPL: 13.1 (ref 7–25)
CALCIUM SERPL-MCNC: 9.6 MG/DL (ref 8.6–10.5)
CHLORIDE SERPL-SCNC: 109 MMOL/L (ref 98–107)
CO2 SERPL-SCNC: 28.1 MMOL/L (ref 22–29)
CREAT SERPL-MCNC: 0.84 MG/DL (ref 0.57–1)
GLUCOSE SERPL-MCNC: 84 MG/DL (ref 65–99)
POTASSIUM SERPL-SCNC: 4.3 MMOL/L (ref 3.5–5.2)
SODIUM SERPL-SCNC: 151 MMOL/L (ref 136–145)

## 2019-11-11 DIAGNOSIS — E87.0 HYPERNATREMIA: Primary | ICD-10-CM

## 2019-11-15 ENCOUNTER — INFUSION (OUTPATIENT)
Dept: ONCOLOGY | Facility: HOSPITAL | Age: 69
End: 2019-11-15

## 2019-11-15 DIAGNOSIS — M81.0 AGE-RELATED OSTEOPOROSIS WITHOUT CURRENT PATHOLOGICAL FRACTURE: Primary | ICD-10-CM

## 2019-11-15 PROCEDURE — 96372 THER/PROPH/DIAG INJ SC/IM: CPT | Performed by: NURSE PRACTITIONER

## 2019-11-15 PROCEDURE — 25010000002 DENOSUMAB 60 MG/ML SOLUTION PREFILLED SYRINGE: Performed by: NURSE PRACTITIONER

## 2019-11-15 RX ADMIN — DENOSUMAB 60 MG: 60 INJECTION SUBCUTANEOUS at 15:01

## 2019-11-15 NOTE — NURSING NOTE
Arrived ambulatory  for prolia injection. Indication and side effects reviewed. Denies recent dental work. Labs and medications verified. Prolia administered in right  arm without incidence. Instructed to call prescribing MD for any concerns or questions and instructed on how to schedule future appts.  Pt vu and discharged ambulatory.

## 2019-11-23 LAB
BUN SERPL-MCNC: 13 MG/DL (ref 8–23)
BUN/CREAT SERPL: 14.9 (ref 7–25)
CALCIUM SERPL-MCNC: 9.6 MG/DL (ref 8.6–10.5)
CHLORIDE SERPL-SCNC: 101 MMOL/L (ref 98–107)
CO2 SERPL-SCNC: 30.3 MMOL/L (ref 22–29)
CREAT SERPL-MCNC: 0.87 MG/DL (ref 0.57–1)
GLUCOSE SERPL-MCNC: 86 MG/DL (ref 65–99)
POTASSIUM SERPL-SCNC: 4.1 MMOL/L (ref 3.5–5.2)
SODIUM SERPL-SCNC: 145 MMOL/L (ref 136–145)

## 2019-12-20 ENCOUNTER — OFFICE VISIT (OUTPATIENT)
Dept: INTERNAL MEDICINE | Facility: CLINIC | Age: 69
End: 2019-12-20

## 2019-12-20 VITALS
RESPIRATION RATE: 14 BRPM | HEIGHT: 62 IN | TEMPERATURE: 97.7 F | HEART RATE: 73 BPM | WEIGHT: 109.1 LBS | OXYGEN SATURATION: 98 % | SYSTOLIC BLOOD PRESSURE: 126 MMHG | BODY MASS INDEX: 20.08 KG/M2 | DIASTOLIC BLOOD PRESSURE: 80 MMHG

## 2019-12-20 DIAGNOSIS — M54.42 ACUTE RIGHT-SIDED LOW BACK PAIN WITH BILATERAL SCIATICA: ICD-10-CM

## 2019-12-20 DIAGNOSIS — M25.552 BILATERAL HIP PAIN: Primary | ICD-10-CM

## 2019-12-20 DIAGNOSIS — M25.551 BILATERAL HIP PAIN: Primary | ICD-10-CM

## 2019-12-20 DIAGNOSIS — M54.41 ACUTE RIGHT-SIDED LOW BACK PAIN WITH BILATERAL SCIATICA: ICD-10-CM

## 2019-12-20 PROCEDURE — 99214 OFFICE O/P EST MOD 30 MIN: CPT | Performed by: INTERNAL MEDICINE

## 2019-12-20 RX ORDER — OMEGA-3S/DHA/EPA/FISH OIL/D3 300MG-1000
CAPSULE ORAL 3 TIMES WEEKLY
COMMUNITY

## 2019-12-20 RX ORDER — MELOXICAM 15 MG/1
15 TABLET ORAL DAILY
Qty: 30 TABLET | Refills: 2 | Status: SHIPPED | OUTPATIENT
Start: 2019-12-20 | End: 2021-11-05

## 2019-12-20 NOTE — PROGRESS NOTES
"Chief Complaint   Patient presents with   • Hip Pain     bilateral pain with thigh pain.  symptoms 6 months       History of Present Illness   Eve Hurley is a 69 y.o. female presents for acute care. She reports bilateral hip pain right greater than left. She notes the pain extends into the thigh. Some calf and toe muscular cramping.   First had \"hip\" pain about 3-4 years ago when she was running.   Now notes that her leg strength is weaker in yoga than previously. She previously had Left side sciatic nerve pain that improved w/ PT, accupuncture, gabapentin, etc.   She takes tylenol bid. She takes ibuprofen about 1 time weekly w/ benefit.       The following portions of the patient's history were reviewed and updated as appropriate: allergies, current medications, past family history, past medical history, past social history, past surgical history and problem list.  Current Outpatient Medications on File Prior to Visit   Medication Sig Dispense Refill   • acetaminophen (TYLENOL) 500 MG tablet Take 1,000 mg by mouth every day.     • calcium carbonate EX (TUMS EX) 750 MG chewable tablet Chew 750 mg 4 (four) times a day.     • denosumab (PROLIA) 60 MG/ML solution syringe Inject 60 mg under the skin every 6 (six) months.     • Flaxseed, Linseed, (FLAXSEED OIL) 1000 MG capsule Take  by mouth.     • levothyroxine (SYNTHROID, LEVOTHROID) 75 MCG tablet Take  by mouth.     • NON FORMULARY Take 1 tablet by mouth Daily. Calcium, Vitamin D 1500 mg, Zinc     • polycarbophil (FIBERCON) 625 MG tablet Take 625 mg by mouth daily.     • Probiotic Product (PROBIOTIC DAILY PO)      • vitamin B-12 (CYANOCOBALAMIN) 1000 MCG tablet Take  by mouth.     • cholecalciferol (VITAMIN D3) 10 MCG (400 UNIT) tablet Take  by mouth 3 (Three) Times a Week.       No current facility-administered medications on file prior to visit.      Review of Systems   Constitutional: Negative.    HENT: Negative.    Eyes: Negative.    Respiratory: Negative.  " "  Cardiovascular: Negative.    Gastrointestinal: Negative.    Endocrine: Negative.    Genitourinary: Negative.    Musculoskeletal: Positive for back pain.        Hip pain     Skin: Negative.    Allergic/Immunologic: Negative.    Neurological: Negative.    Hematological: Negative.    Psychiatric/Behavioral: Negative.        Objective   Physical Exam   Constitutional: She appears well-developed and well-nourished.   HENT:   Head: Normocephalic and atraumatic.   Right Ear: External ear normal.   Left Ear: External ear normal.   Mouth/Throat: Oropharynx is clear and moist.   Eyes: Pupils are equal, round, and reactive to light. EOM are normal.   Cardiovascular: Normal rate, regular rhythm, normal heart sounds and intact distal pulses.   Nursing note and vitals reviewed.       /80   Pulse 73   Temp 97.7 °F (36.5 °C)   Resp 14   Ht 157.5 cm (62\")   Wt 49.5 kg (109 lb 1.6 oz)   LMP  (LMP Unknown)   SpO2 98%   BMI 19.95 kg/m²     Assessment/Plan   Diagnoses and all orders for this visit:    Bilateral hip pain  -     Ambulatory Referral to Physical Therapy Evaluate and treat  -     Ambulatory Referral to Sports Medicine    Acute right-sided low back pain with bilateral sciatica  -     Ambulatory Referral to Physical Therapy Evaluate and treat  -     Ambulatory Referral to Sports Medicine    Other orders  -     cholecalciferol (VITAMIN D3) 10 MCG (400 UNIT) tablet; Take  by mouth 3 (Three) Times a Week.    patient w/ bilateral hip pain right greater than left. Suspect OA of hips and low back contribute to this. She did well w/ physical therapy in the past and will restart this. She will also see sports med to assess for benefit w/ injections or other therapies. meloxicam qd for 1-2 weeks then prn. F/u prn.              "

## 2019-12-26 ENCOUNTER — OFFICE VISIT (OUTPATIENT)
Dept: INTERNAL MEDICINE | Facility: CLINIC | Age: 69
End: 2019-12-26

## 2019-12-26 VITALS
WEIGHT: 110 LBS | HEART RATE: 82 BPM | DIASTOLIC BLOOD PRESSURE: 80 MMHG | SYSTOLIC BLOOD PRESSURE: 126 MMHG | OXYGEN SATURATION: 97 % | TEMPERATURE: 98.1 F | BODY MASS INDEX: 20.24 KG/M2 | RESPIRATION RATE: 18 BRPM | HEIGHT: 62 IN

## 2019-12-26 DIAGNOSIS — J06.9 ACUTE URI: ICD-10-CM

## 2019-12-26 DIAGNOSIS — R05.9 COUGH: Primary | ICD-10-CM

## 2019-12-26 LAB
EXPIRATION DATE: NORMAL
FLUAV AG NPH QL: NEGATIVE
FLUBV AG NPH QL: NEGATIVE
INTERNAL CONTROL: NORMAL
Lab: NORMAL

## 2019-12-26 PROCEDURE — 99213 OFFICE O/P EST LOW 20 MIN: CPT | Performed by: NURSE PRACTITIONER

## 2019-12-26 PROCEDURE — 87804 INFLUENZA ASSAY W/OPTIC: CPT | Performed by: NURSE PRACTITIONER

## 2019-12-26 NOTE — PROGRESS NOTES
Subjective   Eve Hurley is a 69 y.o. female.   Chief Complaint   Patient presents with   • Cough   • Fever     100.4F at home      Vitals:    12/26/19 0959   BP: 126/80   Pulse: 82   Resp: 18   Temp: 98.1 °F (36.7 °C)   SpO2: 97%     No LMP recorded (lmp unknown). Patient is postmenopausal.    Eve is a patient of Dr Gutierrez who is here for an acute visit     Cough   This is a new problem. The current episode started yesterday. The problem has been gradually worsening. The cough is productive of purulent sputum. Associated symptoms include chills, a fever, nasal congestion, postnasal drip and rhinorrhea. Pertinent negatives include no ear pain, shortness of breath, sweats or wheezing. Nothing aggravates the symptoms. She has tried rest (tylenol ) for the symptoms. The treatment provided significant relief.   Fever    Associated symptoms include congestion and coughing. Pertinent negatives include no ear pain or wheezing.        The following portions of the patient's history were reviewed and updated as appropriate: allergies, current medications, past family history, past medical history, past social history, past surgical history and problem list.    Review of Systems   Constitutional: Positive for chills, fatigue and fever.   HENT: Positive for congestion, postnasal drip and rhinorrhea. Negative for ear pain.    Respiratory: Positive for cough. Negative for chest tightness, shortness of breath and wheezing.    Cardiovascular: Negative.        Objective   Physical Exam   Constitutional: Vital signs are normal. She appears well-developed and well-nourished. She does not appear ill. No distress.   HENT:   Head: Normocephalic.   Nose: Rhinorrhea present.   Mouth/Throat: Uvula is midline. Posterior oropharyngeal erythema present.   Cardiovascular: Normal rate, regular rhythm and normal heart sounds.   Pulmonary/Chest: Effort normal and breath sounds normal.   Neurological: She is alert.       Assessment/Plan    Eve was seen today for cough and fever.    Diagnoses and all orders for this visit:    Cough  -     POC Influenza A / B    Acute URI      Flu swab is negative   Symptom treatment for 7-10 days  Rest and fluids  Tylenol or motrin   Avoid second hand smoke and allergens   Throat lozenges, humidifier, vicks vapor rub as needed  Follow up if your symptoms persist past 7-10 days or sooner if your symptoms worsen or if you develop new symptoms                 lack of motivation

## 2020-01-06 ENCOUNTER — OFFICE VISIT (OUTPATIENT)
Dept: INTERNAL MEDICINE | Facility: CLINIC | Age: 70
End: 2020-01-06

## 2020-01-06 VITALS
HEART RATE: 99 BPM | BODY MASS INDEX: 20.24 KG/M2 | DIASTOLIC BLOOD PRESSURE: 68 MMHG | HEIGHT: 62 IN | WEIGHT: 110 LBS | SYSTOLIC BLOOD PRESSURE: 118 MMHG

## 2020-01-06 DIAGNOSIS — R50.9 FEVER, UNSPECIFIED FEVER CAUSE: ICD-10-CM

## 2020-01-06 DIAGNOSIS — J18.9 PNEUMONIA OF RIGHT LOWER LOBE DUE TO INFECTIOUS ORGANISM: Primary | ICD-10-CM

## 2020-01-06 LAB
EXPIRATION DATE: NORMAL
FLUAV AG NPH QL: NEGATIVE
FLUBV AG NPH QL: NEGATIVE
HCT VFR BLDA CALC: 37.6 % (ref 38–51)
HGB BLDA-MCNC: 12.2 G/DL (ref 12–17)
INTERNAL CONTROL: NORMAL
LYMPHOCYTES # BLD: 14.8 %
Lab: NORMAL
MCH, POC: 31
MCHC, POC: 32.4
MCV, POC: 95.6
MONOCYTES # BLD: 3.6 %
PLATELET # BLD: 355 10*3/MM3
PMV BLD: 7.9 FL
POC NEUTROPHIL: 81.6 %
RBC, POC: 3.93
RDW, POC: 14
WBC # BLD: 17.9 10*3/UL

## 2020-01-06 PROCEDURE — 99213 OFFICE O/P EST LOW 20 MIN: CPT | Performed by: INTERNAL MEDICINE

## 2020-01-06 PROCEDURE — 85025 COMPLETE CBC W/AUTO DIFF WBC: CPT | Performed by: INTERNAL MEDICINE

## 2020-01-06 PROCEDURE — 71046 X-RAY EXAM CHEST 2 VIEWS: CPT | Performed by: INTERNAL MEDICINE

## 2020-01-06 PROCEDURE — 87804 INFLUENZA ASSAY W/OPTIC: CPT | Performed by: INTERNAL MEDICINE

## 2020-01-06 RX ORDER — LEVOFLOXACIN 750 MG/1
750 TABLET ORAL DAILY
Qty: 7 TABLET | Refills: 0 | Status: SHIPPED | OUTPATIENT
Start: 2020-01-06 | End: 2020-01-13

## 2020-01-06 RX ORDER — BENZONATATE 100 MG/1
100 CAPSULE ORAL 3 TIMES DAILY PRN
Qty: 30 CAPSULE | Refills: 2 | Status: SHIPPED | OUTPATIENT
Start: 2020-01-06 | End: 2020-01-13

## 2020-01-06 RX ORDER — GUAIFENESIN AND CODEINE PHOSPHATE 100; 10 MG/5ML; MG/5ML
5 SOLUTION ORAL 3 TIMES DAILY PRN
Qty: 125 ML | Refills: 0 | Status: SHIPPED | OUTPATIENT
Start: 2020-01-06 | End: 2020-11-02

## 2020-01-06 NOTE — PROGRESS NOTES
Chief Complaint   Patient presents with   • Chills   • Fever     103 last night   • Cough       History of Present Illness   Eve Hurley is a 69 y.o. female presents for acute care. She reports sinus drainage, cough, and fatigue. Symptoms started about 10 days ago. Initially improved and then returned. Had a fever to 103.0 last night. She has been taking nyquil and a daytime cough syrup. She continues to cough now. Her grand daughter had rsv and pneumonia w/ daughter and son in law w/ bronchitis. She visited them over the holiday.     The following portions of the patient's history were reviewed and updated as appropriate: allergies, current medications, past family history, past medical history, past social history, past surgical history and problem list.  Current Outpatient Medications on File Prior to Visit   Medication Sig Dispense Refill   • acetaminophen (TYLENOL) 500 MG tablet Take 1,000 mg by mouth every day.     • calcium carbonate EX (TUMS EX) 750 MG chewable tablet Chew 750 mg 4 (four) times a day.     • cholecalciferol (VITAMIN D3) 10 MCG (400 UNIT) tablet Take  by mouth 3 (Three) Times a Week.     • denosumab (PROLIA) 60 MG/ML solution syringe Inject 60 mg under the skin every 6 (six) months.     • Flaxseed, Linseed, (FLAXSEED OIL) 1000 MG capsule Take  by mouth.     • levothyroxine (SYNTHROID, LEVOTHROID) 75 MCG tablet Take  by mouth.     • meloxicam (MOBIC) 15 MG tablet Take 1 tablet by mouth Daily. 30 tablet 2   • polycarbophil (FIBERCON) 625 MG tablet Take 625 mg by mouth daily.     • Probiotic Product (PROBIOTIC DAILY PO)      • vitamin B-12 (CYANOCOBALAMIN) 1000 MCG tablet Take  by mouth.     • NON FORMULARY Take 1 tablet by mouth Daily. Calcium, Vitamin D 1500 mg, Zinc       No current facility-administered medications on file prior to visit.      Review of Systems   Constitutional: Positive for fatigue and fever.   HENT: Positive for postnasal drip, rhinorrhea, sneezing and sore throat.   "  Eyes: Negative.    Respiratory: Positive for cough. Negative for shortness of breath and wheezing.    Cardiovascular: Negative.    Gastrointestinal: Negative.    Endocrine: Negative.    Genitourinary: Negative.    Musculoskeletal: Negative.    Skin: Negative.    Allergic/Immunologic: Negative.    Neurological: Negative.    Hematological: Negative.    Psychiatric/Behavioral: Negative.        Objective   Physical Exam   Constitutional: She is oriented to person, place, and time.   Ill appearing   No acute distress   HENT:   Head: Normocephalic and atraumatic.   Right Ear: External ear normal.   Left Ear: External ear normal.   Pharyngeal erythema   Eyes: Pupils are equal, round, and reactive to light. EOM are normal.   Neck: Normal range of motion. Neck supple.   Cardiovascular: Normal rate, regular rhythm and normal heart sounds.   Pulmonary/Chest: Effort normal and breath sounds normal.   Abdominal: Soft. Bowel sounds are normal.   Musculoskeletal: Normal range of motion.   Neurological: She is alert and oriented to person, place, and time.   Skin: Skin is warm and dry.   Psychiatric: She has a normal mood and affect. Her behavior is normal. Judgment and thought content normal.   Nursing note and vitals reviewed.     cxr for cough and fever. RLL infiltrate.   /68   Pulse 99   Ht 157.5 cm (62\")   Wt 49.9 kg (110 lb)   LMP  (LMP Unknown)   BMI 20.12 kg/m²     Assessment/Plan   Diagnoses and all orders for this visit:    Pneumonia of right lower lobe due to infectious organism (CMS/[facility])  -     guaiFENesin-codeine (GUAIFENESIN AC) 100-10 MG/5ML liquid; Take 5 mL by mouth 3 (Three) Times a Day As Needed for Cough.    Fever, unspecified fever cause  -     POC Influenza A / B  -     XR Chest PA & Lateral (In Office)  -     POC CBC With / Auto Diff    Other orders  -     levoFLOXacin (LEVAQUIN) 750 MG tablet; Take 1 tablet by mouth Daily.  -     benzonatate (TESSALON PERLES) 100 MG capsule; Take 1 capsule by " mouth 3 (Three) Times a Day As Needed for Cough.      Patient w/ acute CAP. She is not in any distress. Will treat w/ high dose levaquin w/ precautions given. She will take cheratussin prn cough. Tessalon prn. She will f/u in one week or prn.

## 2020-01-13 ENCOUNTER — OFFICE VISIT (OUTPATIENT)
Dept: INTERNAL MEDICINE | Facility: CLINIC | Age: 70
End: 2020-01-13

## 2020-01-13 VITALS
HEART RATE: 74 BPM | BODY MASS INDEX: 19.88 KG/M2 | HEIGHT: 62 IN | SYSTOLIC BLOOD PRESSURE: 126 MMHG | OXYGEN SATURATION: 96 % | WEIGHT: 108 LBS | DIASTOLIC BLOOD PRESSURE: 80 MMHG

## 2020-01-13 DIAGNOSIS — J18.9 COMMUNITY ACQUIRED PNEUMONIA OF RIGHT LOWER LOBE OF LUNG: Primary | ICD-10-CM

## 2020-01-13 PROCEDURE — 99214 OFFICE O/P EST MOD 30 MIN: CPT | Performed by: INTERNAL MEDICINE

## 2020-01-13 PROCEDURE — 94640 AIRWAY INHALATION TREATMENT: CPT | Performed by: INTERNAL MEDICINE

## 2020-01-13 PROCEDURE — 71046 X-RAY EXAM CHEST 2 VIEWS: CPT | Performed by: INTERNAL MEDICINE

## 2020-01-13 RX ORDER — LEVALBUTEROL INHALATION SOLUTION 0.63 MG/3ML
0.63 SOLUTION RESPIRATORY (INHALATION) EVERY 6 HOURS PRN
Status: SHIPPED | OUTPATIENT
Start: 2020-01-13

## 2020-01-13 RX ADMIN — LEVALBUTEROL INHALATION SOLUTION 0.63 MG: 0.63 SOLUTION RESPIRATORY (INHALATION) at 16:05

## 2020-01-13 NOTE — PROGRESS NOTES
Chief Complaint   Patient presents with   • Pneumonia     feeling better       History of Present Illness   Eve Hurley is a 69 y.o. female presents for follow up on CAP. She completed 7 days levaquin yesterday. She has been afebrile for several days. She is coughing less but some persistence. She has some fatigue. Did not get drowsy with cheratussin but is sleeping better the last couple of nights.     The following portions of the patient's history were reviewed and updated as appropriate: allergies, current medications, past family history, past medical history, past social history, past surgical history and problem list.  Current Outpatient Medications on File Prior to Visit   Medication Sig Dispense Refill   • acetaminophen (TYLENOL) 500 MG tablet Take 1,000 mg by mouth every day.     • calcium carbonate EX (TUMS EX) 750 MG chewable tablet Chew 750 mg 4 (four) times a day.     • cholecalciferol (VITAMIN D3) 10 MCG (400 UNIT) tablet Take  by mouth 3 (Three) Times a Week.     • denosumab (PROLIA) 60 MG/ML solution syringe Inject 60 mg under the skin every 6 (six) months.     • Flaxseed, Linseed, (FLAXSEED OIL) 1000 MG capsule Take  by mouth.     • guaiFENesin-codeine (GUAIFENESIN AC) 100-10 MG/5ML liquid Take 5 mL by mouth 3 (Three) Times a Day As Needed for Cough. 125 mL 0   • levothyroxine (SYNTHROID, LEVOTHROID) 75 MCG tablet Take  by mouth.     • meloxicam (MOBIC) 15 MG tablet Take 1 tablet by mouth Daily. 30 tablet 2   • NON FORMULARY Take 1 tablet by mouth Daily. Calcium, Vitamin D 1500 mg, Zinc     • polycarbophil (FIBERCON) 625 MG tablet Take 625 mg by mouth daily.     • Probiotic Product (PROBIOTIC DAILY PO)      • vitamin B-12 (CYANOCOBALAMIN) 1000 MCG tablet Take  by mouth.     • [DISCONTINUED] benzonatate (TESSALON PERLES) 100 MG capsule Take 1 capsule by mouth 3 (Three) Times a Day As Needed for Cough. 30 capsule 2   • [DISCONTINUED] levoFLOXacin (LEVAQUIN) 750 MG tablet Take 1 tablet by mouth  "Daily. 7 tablet 0     No current facility-administered medications on file prior to visit.      Review of Systems   Constitutional: Positive for fatigue.   HENT: Positive for postnasal drip, rhinorrhea and sore throat.    Eyes: Negative.    Respiratory: Positive for cough and shortness of breath.    Cardiovascular: Negative.    Gastrointestinal: Negative.    Endocrine: Negative.    Genitourinary: Negative.    Musculoskeletal: Negative.    Allergic/Immunologic: Negative.    Neurological: Negative.    Hematological: Negative.    Psychiatric/Behavioral: Negative.        Objective   Physical Exam   Constitutional: She is oriented to person, place, and time. She appears well-developed and well-nourished.   HENT:   Head: Normocephalic and atraumatic.   Right Ear: External ear normal.   Left Ear: External ear normal.   Nose: Nose normal.   Mouth/Throat: Oropharynx is clear and moist.   Mild pharyngeal erythema   Eyes: Pupils are equal, round, and reactive to light. Conjunctivae and EOM are normal.   Neck: Normal range of motion. Neck supple.   Cardiovascular: Normal rate, regular rhythm, normal heart sounds and intact distal pulses.   Pulmonary/Chest: Effort normal and breath sounds normal.   Abdominal: Soft. Bowel sounds are normal.   Musculoskeletal: Normal range of motion.   Neurological: She is alert and oriented to person, place, and time.   Skin: Skin is warm and dry.   Psychiatric: She has a normal mood and affect. Her behavior is normal. Judgment and thought content normal.   Nursing note and vitals reviewed.     CXR for CAP. Decreased infiltrate RLL.   /80   Pulse 74   Ht 157.5 cm (62\")   Wt 49 kg (108 lb)   LMP  (LMP Unknown)   SpO2 96%   BMI 19.75 kg/m²     Assessment/Plan   Diagnoses and all orders for this visit:    Community acquired pneumonia of right lower lobe of lung (CMS/HCC)        Patient w/ CAP. She has completed antibiotics and is improving both clinically and radiographically.  Some post " inflammatory bronchospasm. A xopenex nebulizer was administered today. She may take tessalon or robitussin dm for symptoms relief. Encouraged continued rest w/ a very gradual resumption of usual activities. Will repeat cxr in 1 month to document full resolution.

## 2020-02-28 ENCOUNTER — OFFICE VISIT (OUTPATIENT)
Dept: SPORTS MEDICINE | Facility: CLINIC | Age: 70
End: 2020-02-28

## 2020-02-28 VITALS
DIASTOLIC BLOOD PRESSURE: 60 MMHG | SYSTOLIC BLOOD PRESSURE: 118 MMHG | HEART RATE: 75 BPM | HEIGHT: 62 IN | WEIGHT: 108 LBS | OXYGEN SATURATION: 98 % | BODY MASS INDEX: 19.88 KG/M2 | TEMPERATURE: 97.6 F

## 2020-02-28 DIAGNOSIS — M25.551 GREATER TROCHANTERIC PAIN SYNDROME OF BOTH LOWER EXTREMITIES: Primary | ICD-10-CM

## 2020-02-28 DIAGNOSIS — M25.552 GREATER TROCHANTERIC PAIN SYNDROME OF BOTH LOWER EXTREMITIES: Primary | ICD-10-CM

## 2020-02-28 PROCEDURE — 99214 OFFICE O/P EST MOD 30 MIN: CPT | Performed by: FAMILY MEDICINE

## 2020-02-28 PROCEDURE — 73521 X-RAY EXAM HIPS BI 2 VIEWS: CPT | Performed by: FAMILY MEDICINE

## 2020-02-28 PROCEDURE — 20610 DRAIN/INJ JOINT/BURSA W/O US: CPT | Performed by: FAMILY MEDICINE

## 2020-02-28 RX ORDER — TRIAMCINOLONE ACETONIDE 40 MG/ML
80 INJECTION, SUSPENSION INTRA-ARTICULAR; INTRAMUSCULAR ONCE
Status: COMPLETED | OUTPATIENT
Start: 2020-02-28 | End: 2020-02-28

## 2020-02-28 RX ADMIN — TRIAMCINOLONE ACETONIDE 80 MG: 40 INJECTION, SUSPENSION INTRA-ARTICULAR; INTRAMUSCULAR at 16:10

## 2020-02-28 NOTE — PROGRESS NOTES
"Eve is a 70 y.o. year old female evaluation of a problem that is new to this examiner.    Chief Complaint   Patient presents with   • Hip Pain     bilateral       History of Present Illness   HPI   For many years bilat hip bursitis. Seems to be getitng worse. Both legs and hips. Worse after exercise. Also worse with carrying 3 yo grand dauhgter. \"Deep ache\" MRI LS 2018.  The lateral hip pain seems to be a separate issue from just a generalized deep ache into her thighs only at night when she tries to sleep.  No numbness or paresthesias.  No burning pain.  No pins-and-needles.  She does get an exacerbation of the greater trochanteric pain with sleeping on either side.    I have reviewed the patient's medical, family, and social history in detail and updated the computerized patient record.    Review of Systems   Constitutional: Negative for fever.   Musculoskeletal:        Per HPI   Skin: Negative for wound.   Neurological: Negative for numbness.   All other systems reviewed and are negative.      /60 (BP Location: Left arm, Patient Position: Sitting, Cuff Size: Adult)   Pulse 75   Temp 97.6 °F (36.4 °C) (Oral)   Ht 157.5 cm (62.01\")   Wt 49 kg (108 lb)   LMP  (LMP Unknown)   SpO2 98%   BMI 19.75 kg/m²      Physical Exam   Constitutional: She is oriented to person, place, and time. She appears well-developed and well-nourished.   HENT:   Head: Normocephalic and atraumatic.   Eyes: Pupils are equal, round, and reactive to light. Conjunctivae and EOM are normal.   Cardiovascular:   No peripheral edema   Pulmonary/Chest: Effort normal.   Musculoskeletal:   Bilateral hips, negative logroll.  Full painless range of motion.  No pain with resisted hip abduction.  Patient has tenderness to palpation over both greater trochanteric areas.  Lumbar spine with full painless range of motion.  Negative straight leg raise bilaterally.  Negative slump test.   Neurological: She is alert and oriented to person, place, " "and time.   Skin: Skin is warm and dry.   Psychiatric: She has a normal mood and affect. Her behavior is normal.   Vitals reviewed.  Viewed MRI from 5/16/2018 showing multilevel degenerative disc disease but more prominent at L4-L5 with a broad-based disc osteophyte complex to the left.  Also possible small disc protrusion laterally to the left.    Bilateral Hip X-Ray  Indication: Pain  AP and Frog Leg views    Findings:  No fracture  No bony lesion  Normal soft tissues  Very minimal changes of arthritis both hips.    No prior studies were available for comparison.    Discussed with the patient that she certainly appears to have bilateral greater trochanteric pain syndrome and most likely from the bursa.  Treatment options discussed with the patient and she would like to go ahead with corticosteroid injection and home exercises.    Trochanteric Bursa Injection Procedure Note    Bilateral trochanteric bursa/gluteal tendon insertion injection was discussed with the patient in detail, including indication, risks, benefits, and alternatives. Verbal consent was given for the procedure. Injection was performed by physician.  Injection site was identified by physical examination and cleaned with Betadine and alcohol swabs. Prior to needle insertion, ethyl chloride spray was used for surface anesthesia. Sterile technique was used.  A 25-gauge, 1.5\" needle was directed to the bursa space by direct approach. Injectate was passed without difficulty. The needle was removed and a simple bandage was applied. The procedure was tolerated well without difficulty.    Injection mixture:  1% lidocaine without epinephrine: 3 mL each  40 mg/mL triamcinolone acetonide: 1 mL each      Current Outpatient Medications:   •  acetaminophen (TYLENOL) 500 MG tablet, Take 1,000 mg by mouth every day., Disp: , Rfl:   •  calcium carbonate EX (TUMS EX) 750 MG chewable tablet, Chew 750 mg 4 (four) times a day., Disp: , Rfl:   •  cholecalciferol " (VITAMIN D3) 10 MCG (400 UNIT) tablet, Take  by mouth 3 (Three) Times a Week., Disp: , Rfl:   •  denosumab (PROLIA) 60 MG/ML solution syringe, Inject 60 mg under the skin every 6 (six) months., Disp: , Rfl:   •  Flaxseed, Linseed, (FLAXSEED OIL) 1000 MG capsule, Take  by mouth., Disp: , Rfl:   •  guaiFENesin-codeine (GUAIFENESIN AC) 100-10 MG/5ML liquid, Take 5 mL by mouth 3 (Three) Times a Day As Needed for Cough., Disp: 125 mL, Rfl: 0  •  levothyroxine (SYNTHROID, LEVOTHROID) 75 MCG tablet, Take  by mouth., Disp: , Rfl:   •  meloxicam (MOBIC) 15 MG tablet, Take 1 tablet by mouth Daily., Disp: 30 tablet, Rfl: 2  •  NON FORMULARY, Take 1 tablet by mouth Daily. Calcium, Vitamin D 1500 mg, Zinc, Disp: , Rfl:   •  polycarbophil (FIBERCON) 625 MG tablet, Take 625 mg by mouth daily., Disp: , Rfl:   •  Probiotic Product (PROBIOTIC DAILY PO), , Disp: , Rfl:   •  vitamin B-12 (CYANOCOBALAMIN) 1000 MCG tablet, Take  by mouth., Disp: , Rfl:     Current Facility-Administered Medications:   •  levalbuterol (XOPENEX) nebulizer solution 0.63 mg, 0.63 mg, Nebulization, Q6H PRN, Gladys Gutierrez MD, 0.63 mg at 01/13/20 1605     Diagnoses and all orders for this visit:    Greater trochanteric pain syndrome of both lower extremities  -     Cancel: XR Hips Bilateral With or Without Pelvis 2 View; Future  -     XR Hips Bilateral With or Without Pelvis 2 View  -     triamcinolone acetonide (KENALOG-40) injection 80 mg       Discussed with the patient that she certainly appears to have bilateral greater trochanteric pain syndrome.  We will try the corticosteroid injection and have given her postinjection instructions regarding ice and activity.  Also given her a set of home exercises.  In regards to her bilateral leg pain at night I am not sure this seems to be coming from her back but may represent possible restless leg syndrome.  She will discuss this with her primary care physician.      EMR Dragon/Transcription disclaimer:    Much of  this encounter note is an electronic transcription/translation of spoken language to printed text.  The electronic translation of spoken language may permit erroneous, or at times, nonsensical words or phrases to be inadvertently transcribed.  Although I have reviewed the note for such errors some may still exist.

## 2020-05-11 ENCOUNTER — TELEPHONE (OUTPATIENT)
Dept: INTERNAL MEDICINE | Facility: CLINIC | Age: 70
End: 2020-05-11

## 2020-05-11 DIAGNOSIS — M81.0 AGE-RELATED OSTEOPOROSIS WITHOUT CURRENT PATHOLOGICAL FRACTURE: ICD-10-CM

## 2020-05-11 DIAGNOSIS — E03.9 HYPOTHYROIDISM, UNSPECIFIED TYPE: Primary | ICD-10-CM

## 2020-05-11 DIAGNOSIS — M81.0 AGE-RELATED OSTEOPOROSIS WITHOUT CURRENT PATHOLOGICAL FRACTURE: Primary | ICD-10-CM

## 2020-05-11 DIAGNOSIS — R03.0 ELEVATED BP WITHOUT DIAGNOSIS OF HYPERTENSION: ICD-10-CM

## 2020-05-11 NOTE — TELEPHONE ENCOUNTER
PATIENT NEEDS  ORDER FOR PROLIA    PATIENT NEEDS ORDER FOR LABS  O    GOING TO [facility]    NEED ORDER PLACED BEFORE Friday

## 2020-05-18 ENCOUNTER — LAB (OUTPATIENT)
Dept: OTHER | Facility: HOSPITAL | Age: 70
End: 2020-05-18

## 2020-05-18 ENCOUNTER — INFUSION (OUTPATIENT)
Dept: ONCOLOGY | Facility: HOSPITAL | Age: 70
End: 2020-05-18

## 2020-05-18 VITALS
SYSTOLIC BLOOD PRESSURE: 134 MMHG | DIASTOLIC BLOOD PRESSURE: 80 MMHG | HEART RATE: 69 BPM | TEMPERATURE: 98.7 F | OXYGEN SATURATION: 97 % | RESPIRATION RATE: 18 BRPM | WEIGHT: 107 LBS | BODY MASS INDEX: 19.57 KG/M2

## 2020-05-18 DIAGNOSIS — M81.0 AGE-RELATED OSTEOPOROSIS WITHOUT CURRENT PATHOLOGICAL FRACTURE: ICD-10-CM

## 2020-05-18 DIAGNOSIS — E03.9 HYPOTHYROIDISM, UNSPECIFIED TYPE: Primary | ICD-10-CM

## 2020-05-18 DIAGNOSIS — M81.0 SENILE OSTEOPOROSIS: Primary | ICD-10-CM

## 2020-05-18 LAB
ALBUMIN SERPL-MCNC: 4.9 G/DL (ref 3.5–5.2)
ALBUMIN/GLOB SERPL: 2 G/DL
ALP SERPL-CCNC: 43 U/L (ref 39–117)
ALT SERPL W P-5'-P-CCNC: 12 U/L (ref 1–33)
ANION GAP SERPL CALCULATED.3IONS-SCNC: 9.5 MMOL/L (ref 5–15)
AST SERPL-CCNC: 19 U/L (ref 1–32)
BACTERIA UR QL AUTO: NORMAL /HPF
BASOPHILS # BLD AUTO: 0.09 10*3/MM3 (ref 0–0.2)
BASOPHILS NFR BLD AUTO: 1 % (ref 0–1.5)
BILIRUB SERPL-MCNC: 0.3 MG/DL (ref 0.1–1.2)
BILIRUB UR QL STRIP: NEGATIVE
BUN BLD-MCNC: 12 MG/DL (ref 8–23)
BUN/CREAT SERPL: 14.8 (ref 7–25)
CALCIUM SPEC-SCNC: 10.1 MG/DL (ref 8.6–10.5)
CHLORIDE SERPL-SCNC: 102 MMOL/L (ref 98–107)
CLARITY UR: CLEAR
CO2 SERPL-SCNC: 32.5 MMOL/L (ref 22–29)
COLOR UR: YELLOW
CREAT BLD-MCNC: 0.81 MG/DL (ref 0.57–1)
DEPRECATED RDW RBC AUTO: 47 FL (ref 37–54)
EOSINOPHIL # BLD AUTO: 0.31 10*3/MM3 (ref 0–0.4)
EOSINOPHIL NFR BLD AUTO: 3.6 % (ref 0.3–6.2)
ERYTHROCYTE [DISTWIDTH] IN BLOOD BY AUTOMATED COUNT: 13.1 % (ref 12.3–15.4)
GFR SERPL CREATININE-BSD FRML MDRD: 70 ML/MIN/1.73
GLOBULIN UR ELPH-MCNC: 2.4 GM/DL
GLUCOSE BLD-MCNC: 117 MG/DL (ref 65–99)
GLUCOSE UR STRIP-MCNC: NEGATIVE MG/DL
HCT VFR BLD AUTO: 42.3 % (ref 34–46.6)
HGB BLD-MCNC: 13.6 G/DL (ref 12–15.9)
HGB UR QL STRIP.AUTO: ABNORMAL
HYALINE CASTS UR QL AUTO: NORMAL /LPF
IMM GRANULOCYTES # BLD AUTO: 0.03 10*3/MM3 (ref 0–0.05)
IMM GRANULOCYTES NFR BLD AUTO: 0.3 % (ref 0–0.5)
KETONES UR QL STRIP: NEGATIVE
LEUKOCYTE ESTERASE UR QL STRIP.AUTO: NEGATIVE
LYMPHOCYTES # BLD AUTO: 2.57 10*3/MM3 (ref 0.7–3.1)
LYMPHOCYTES NFR BLD AUTO: 29.5 % (ref 19.6–45.3)
MCH RBC QN AUTO: 31.3 PG (ref 26.6–33)
MCHC RBC AUTO-ENTMCNC: 32.2 G/DL (ref 31.5–35.7)
MCV RBC AUTO: 97.5 FL (ref 79–97)
MONOCYTES # BLD AUTO: 0.71 10*3/MM3 (ref 0.1–0.9)
MONOCYTES NFR BLD AUTO: 8.1 % (ref 5–12)
NEUTROPHILS # BLD AUTO: 5.01 10*3/MM3 (ref 1.7–7)
NEUTROPHILS NFR BLD AUTO: 57.5 % (ref 42.7–76)
NITRITE UR QL STRIP: NEGATIVE
NRBC BLD AUTO-RTO: 0 /100 WBC (ref 0–0.2)
PH UR STRIP.AUTO: 8.5 [PH] (ref 5–8)
PLATELET # BLD AUTO: 356 10*3/MM3 (ref 140–450)
PMV BLD AUTO: 10.1 FL (ref 6–12)
POTASSIUM BLD-SCNC: 4.2 MMOL/L (ref 3.5–5.2)
PROT SERPL-MCNC: 7.3 G/DL (ref 6–8.5)
PROT UR QL STRIP: NEGATIVE
RBC # BLD AUTO: 4.34 10*6/MM3 (ref 3.77–5.28)
RBC # UR: NORMAL /HPF
REF LAB TEST METHOD: NORMAL
SODIUM BLD-SCNC: 144 MMOL/L (ref 136–145)
SP GR UR STRIP: 1.01 (ref 1–1.03)
SQUAMOUS #/AREA URNS HPF: NORMAL /HPF
TSH SERPL DL<=0.05 MIU/L-ACNC: 0.76 UIU/ML (ref 0.27–4.2)
UROBILINOGEN UR QL STRIP: ABNORMAL
WBC NRBC COR # BLD: 8.72 10*3/MM3 (ref 3.4–10.8)
WBC UR QL AUTO: NORMAL /HPF

## 2020-05-18 PROCEDURE — 81001 URINALYSIS AUTO W/SCOPE: CPT | Performed by: INTERNAL MEDICINE

## 2020-05-18 PROCEDURE — 85025 COMPLETE CBC W/AUTO DIFF WBC: CPT | Performed by: INTERNAL MEDICINE

## 2020-05-18 PROCEDURE — 80053 COMPREHEN METABOLIC PANEL: CPT | Performed by: INTERNAL MEDICINE

## 2020-05-18 PROCEDURE — 84443 ASSAY THYROID STIM HORMONE: CPT | Performed by: INTERNAL MEDICINE

## 2020-05-18 PROCEDURE — 25010000002 DENOSUMAB 60 MG/ML SOLUTION PREFILLED SYRINGE: Performed by: INTERNAL MEDICINE

## 2020-05-18 PROCEDURE — 80061 LIPID PANEL: CPT | Performed by: INTERNAL MEDICINE

## 2020-05-18 PROCEDURE — 96372 THER/PROPH/DIAG INJ SC/IM: CPT

## 2020-05-18 PROCEDURE — 36415 COLL VENOUS BLD VENIPUNCTURE: CPT | Performed by: INTERNAL MEDICINE

## 2020-05-18 RX ADMIN — DENOSUMAB 60 MG: 60 INJECTION SUBCUTANEOUS at 14:26

## 2020-05-19 LAB
CHOLEST SERPL-MCNC: 206 MG/DL (ref 100–199)
HDLC SERPL-MCNC: 103 MG/DL
LDLC SERPL CALC-MCNC: 78 MG/DL (ref 0–99)
LDLC/HDLC SERPL: 0.8 RATIO (ref 0–3.2)
TRIGL SERPL-MCNC: 124 MG/DL (ref 0–149)
VLDLC SERPL-MCNC: 25 MG/DL (ref 5–40)

## 2020-05-22 ENCOUNTER — TELEPHONE (OUTPATIENT)
Dept: INTERNAL MEDICINE | Facility: CLINIC | Age: 70
End: 2020-05-22

## 2020-05-22 NOTE — TELEPHONE ENCOUNTER
----- Message from Gladys Gutierrez MD sent at 5/22/2020  4:43 PM EDT -----  Only lab I see is lipid. Can cmp and tsh be added?     ----- Message -----  From: Le Garber MA  Sent: 5/22/2020   1:44 PM EDT  To: Gladys Gutierrez MD     Orders were placed for [facility] were canceled . [facility] results are in patients chart.   ----- Message -----  From: Gladys Gutierrez MD  Sent: 5/22/2020  12:56 PM EDT  To: Le Garber MA    1. Advise patient her cholesterol is normal  2. Why were all other labs cancelled (cbc, cmp, thyroid? )  3. Please reschedule patient's April appointment.  Thank you!

## 2020-10-23 DIAGNOSIS — E03.9 HYPOTHYROIDISM, UNSPECIFIED TYPE: Primary | ICD-10-CM

## 2020-10-23 DIAGNOSIS — Z00.00 HEALTHCARE MAINTENANCE: ICD-10-CM

## 2020-10-26 LAB
ALBUMIN SERPL-MCNC: 4.4 G/DL (ref 3.5–5.2)
ALBUMIN/GLOB SERPL: 2 G/DL
ALP SERPL-CCNC: 40 U/L (ref 39–117)
ALT SERPL-CCNC: 12 U/L (ref 1–33)
APPEARANCE UR: CLEAR
AST SERPL-CCNC: 18 U/L (ref 1–32)
BACTERIA #/AREA URNS HPF: NORMAL /HPF
BASOPHILS # BLD AUTO: 0.08 10*3/MM3 (ref 0–0.2)
BASOPHILS NFR BLD AUTO: 1 % (ref 0–1.5)
BILIRUB SERPL-MCNC: 0.4 MG/DL (ref 0–1.2)
BILIRUB UR QL STRIP: NEGATIVE
BUN SERPL-MCNC: 13 MG/DL (ref 8–23)
BUN/CREAT SERPL: 14.1 (ref 7–25)
CALCIUM SERPL-MCNC: 9.3 MG/DL (ref 8.6–10.5)
CASTS URNS MICRO: NORMAL
CHLORIDE SERPL-SCNC: 103 MMOL/L (ref 98–107)
CHOLEST SERPL-MCNC: 195 MG/DL (ref 0–200)
CO2 SERPL-SCNC: 27 MMOL/L (ref 22–29)
COLOR UR: YELLOW
CREAT SERPL-MCNC: 0.92 MG/DL (ref 0.57–1)
EOSINOPHIL # BLD AUTO: 0.11 10*3/MM3 (ref 0–0.4)
EOSINOPHIL NFR BLD AUTO: 1.4 % (ref 0.3–6.2)
EPI CELLS #/AREA URNS HPF: NORMAL /HPF
ERYTHROCYTE [DISTWIDTH] IN BLOOD BY AUTOMATED COUNT: 12.3 % (ref 12.3–15.4)
GLOBULIN SER CALC-MCNC: 2.2 GM/DL
GLUCOSE SERPL-MCNC: 82 MG/DL (ref 65–99)
GLUCOSE UR QL: NEGATIVE
HCT VFR BLD AUTO: 39.3 % (ref 34–46.6)
HDLC SERPL-MCNC: 121 MG/DL (ref 40–60)
HGB BLD-MCNC: 13.1 G/DL (ref 12–15.9)
HGB UR QL STRIP: ABNORMAL
IMM GRANULOCYTES # BLD AUTO: 0.03 10*3/MM3 (ref 0–0.05)
IMM GRANULOCYTES NFR BLD AUTO: 0.4 % (ref 0–0.5)
KETONES UR QL STRIP: NEGATIVE
LDLC SERPL CALC-MCNC: 60 MG/DL (ref 0–100)
LEUKOCYTE ESTERASE UR QL STRIP: NEGATIVE
LYMPHOCYTES # BLD AUTO: 1.96 10*3/MM3 (ref 0.7–3.1)
LYMPHOCYTES NFR BLD AUTO: 24.9 % (ref 19.6–45.3)
MCH RBC QN AUTO: 31 PG (ref 26.6–33)
MCHC RBC AUTO-ENTMCNC: 33.3 G/DL (ref 31.5–35.7)
MCV RBC AUTO: 92.9 FL (ref 79–97)
MONOCYTES # BLD AUTO: 0.49 10*3/MM3 (ref 0.1–0.9)
MONOCYTES NFR BLD AUTO: 6.2 % (ref 5–12)
NEUTROPHILS # BLD AUTO: 5.21 10*3/MM3 (ref 1.7–7)
NEUTROPHILS NFR BLD AUTO: 66.1 % (ref 42.7–76)
NITRITE UR QL STRIP: NEGATIVE
NRBC BLD AUTO-RTO: 0 /100 WBC (ref 0–0.2)
PH UR STRIP: 7.5 [PH] (ref 5–8)
PLATELET # BLD AUTO: 352 10*3/MM3 (ref 140–450)
POTASSIUM SERPL-SCNC: 4.2 MMOL/L (ref 3.5–5.2)
PROT SERPL-MCNC: 6.6 G/DL (ref 6–8.5)
PROT UR QL STRIP: NEGATIVE
RBC # BLD AUTO: 4.23 10*6/MM3 (ref 3.77–5.28)
RBC #/AREA URNS HPF: NORMAL /HPF
SODIUM SERPL-SCNC: 141 MMOL/L (ref 136–145)
SP GR UR: ABNORMAL (ref 1–1.03)
TRIGL SERPL-MCNC: 79 MG/DL (ref 0–150)
TSH SERPL DL<=0.005 MIU/L-ACNC: 1.3 UIU/ML (ref 0.27–4.2)
UROBILINOGEN UR STRIP-MCNC: ABNORMAL MG/DL
VLDLC SERPL CALC-MCNC: 14 MG/DL (ref 5–40)
WBC # BLD AUTO: 7.88 10*3/MM3 (ref 3.4–10.8)
WBC #/AREA URNS HPF: NORMAL /HPF

## 2020-11-02 ENCOUNTER — OFFICE VISIT (OUTPATIENT)
Dept: INTERNAL MEDICINE | Facility: CLINIC | Age: 70
End: 2020-11-02

## 2020-11-02 VITALS
WEIGHT: 110 LBS | HEIGHT: 62 IN | HEART RATE: 83 BPM | BODY MASS INDEX: 20.24 KG/M2 | DIASTOLIC BLOOD PRESSURE: 74 MMHG | SYSTOLIC BLOOD PRESSURE: 130 MMHG

## 2020-11-02 DIAGNOSIS — R87.619 ABNORMAL CERVICAL PAPANICOLAOU SMEAR, UNSPECIFIED ABNORMAL PAP FINDING: ICD-10-CM

## 2020-11-02 DIAGNOSIS — M81.0 AGE RELATED OSTEOPOROSIS, UNSPECIFIED PATHOLOGICAL FRACTURE PRESENCE: ICD-10-CM

## 2020-11-02 DIAGNOSIS — Z00.00 HEALTHCARE MAINTENANCE: Primary | ICD-10-CM

## 2020-11-02 DIAGNOSIS — Z12.4 CERVICAL CANCER SCREENING: ICD-10-CM

## 2020-11-02 DIAGNOSIS — M25.552 BILATERAL HIP PAIN: ICD-10-CM

## 2020-11-02 DIAGNOSIS — M54.2 NECK PAIN ON RIGHT SIDE: ICD-10-CM

## 2020-11-02 DIAGNOSIS — H91.90 HEARING LOSS, UNSPECIFIED HEARING LOSS TYPE, UNSPECIFIED LATERALITY: ICD-10-CM

## 2020-11-02 DIAGNOSIS — I10 HYPERTENSION, UNSPECIFIED TYPE: ICD-10-CM

## 2020-11-02 DIAGNOSIS — M25.551 BILATERAL HIP PAIN: ICD-10-CM

## 2020-11-02 DIAGNOSIS — IMO0002 HUMAN PAPILLOMA VIRUS (HPV) DNA TEST POSITIVE: ICD-10-CM

## 2020-11-02 PROCEDURE — 90715 TDAP VACCINE 7 YRS/> IM: CPT | Performed by: INTERNAL MEDICINE

## 2020-11-02 PROCEDURE — 90471 IMMUNIZATION ADMIN: CPT | Performed by: INTERNAL MEDICINE

## 2020-11-02 PROCEDURE — G0439 PPPS, SUBSEQ VISIT: HCPCS | Performed by: INTERNAL MEDICINE

## 2020-11-02 PROCEDURE — 99213 OFFICE O/P EST LOW 20 MIN: CPT | Performed by: INTERNAL MEDICINE

## 2020-11-02 PROCEDURE — 96160 PT-FOCUSED HLTH RISK ASSMT: CPT | Performed by: INTERNAL MEDICINE

## 2020-11-02 NOTE — PROGRESS NOTES
Subjective     Eve Hurley is a 70 y.o. female who presents for an annual wellness visit as well as check up of  Hypothyroidism, osteoporosis, hearing impairment, B hip pain.     History of Present Illness   71 y/o wf w/ pmhx hypothyroid, osteoporosis, B hip pain presents for AWV. She is doing well today. Notes some persistent but more pronounced hearing loss. This is magnified by being around others in masks. Had audiology assessment in the past and has a h/o perf tm. Previously did not require hearing aids but curious if she could benefit from this now. Has B hip pain. Received steroid injections in feb. Had good relief for at tleast a coupld of months then this gradually abated. Discomfort varies and she is not sure if this is activity related. She has meloxicam available but uses this sparingly. TSH is at goal level.         Review of Systems   Constitutional: Negative.    HENT: Negative.    Eyes: Negative.    Respiratory: Negative.    Cardiovascular: Negative.    Gastrointestinal: Negative.    Endocrine: Negative.    Genitourinary: Negative.    Musculoskeletal: Positive for arthralgias and neck pain.        Neck and B hip pain     Skin: Negative.    Allergic/Immunologic: Negative.    Neurological: Negative.    Hematological: Negative.    Psychiatric/Behavioral: Negative.        The following portions of the patient's history were reviewed and updated as appropriate: allergies, current medications, past family history, past medical history, past social history, past surgical history and problem list.  Health maintenance tab was reviewed and updated with the patient.       Patient Active Problem List    Diagnosis Date Noted   • Buttock trauma 09/18/2017   • Hematoma 09/18/2017   • Abnormal CT of the abdomen 08/23/2017     Note Last Updated: 8/23/2017     Added automatically from request for surgery [id number]     • Senile osteoporosis 04/26/2017   • Diverticulitis of intestine without perforation or abscess  without bleeding 2017   • Rectal bleeding 03/15/2016   • Hypothyroidism 2016   • Arthralgia of hip 2016   • Osteoporosis 2016   • Hearing difficulty 2016       Past Medical History:   Diagnosis Date   • Actinic keratoses    • Anemia    • Atypical chest pain 2009    SEEN AT [facility] ER   • Cataract    • DDD (degenerative disc disease), cervical    • Disease of thyroid gland     HYPOTHYROIDISM   • Disorder of cecum 2017    CT OF ABD AND PELVIS AT [facility] SHOWED THICKENING IN CECUM, POSSIBLE MASS   • Diverticulitis     MOST RECENTLY ON 19, HAS HAD 4-5 EPISODES IN PAST 20 YEARS.    • Diverticulosis    • Fall    • Fibrocystic breast    • Hearing difficulty    • Hip joint pain    • HL (hearing loss)    • Hypertension    • Hypothyroidism    • Irritable bowel syndrome    • Left lower quadrant pain 2004    CT SCAN AT [facility] SHOWED ESSENTIALLY NORMAL EXCEPT 2 VERY SMALL HEPATIC CYSTS AND CYSTIC STRUCTURE ON THE LEFT SIDE OF OF THE PELVIS REPRESENTING A 2CM OVARIAN CYST   • Lower extremity pain, left 06/15/2012    VENOUS DOPLER AT [facility] WAS WNL   • Osteopenia    • Osteoporosis     GETS PROLIA INJECTIONS   • Perforated ear drum 2014    MICROPERFORATION, LEFT EAR, SAW DR. GREGORY LAGOS   • Pneumonia    • PONV (postoperative nausea and vomiting)    • Rectal bleed 03/15/2016   • Renal insufficiency 2016   • Tremor    • Urinary tract infection    • Vegetarian     VEGAN   • Visual impairment        Past Surgical History:   Procedure Laterality Date   • BREAST SURGERY Right     CYST ASPIRATION   •  SECTION N/A    • COLONOSCOPY N/A 2004    DIVERTICULOSIS, REDUNDANT SIGMOID, DR. KELLEN ROBERTS AT [place]   • COLONOSCOPY N/A 2017    Procedure: COLONOSCOPY INTO CECUM;  Surgeon: Marito Dunbar MD;  Location: [facility] ENDOSCOPY;  Service:    • HEMORRHOIDECTOMY N/A 2009    DR. KELLEN ROBERTS AT [place]   • TUBAL ABDOMINAL LIGATION Bilateral        Family History    Problem Relation Age of Onset   • Coronary artery disease Father    • Heart disease Father    • Hypertension Father    • Arthritis Father    • Alcohol abuse Father    • Hyperthyroidism Brother    • Thyroid disease Brother    • Diabetes Daughter    • Coronary artery disease Maternal Grandmother    • Arthritis Maternal Grandmother    • Asthma Maternal Grandmother    • ALS Mother    • Depression Mother    • Early death Mother         ALS   • Thyroid disease Sister    • Heart disease Maternal Grandfather    • Alcohol abuse Maternal Grandfather    • Early death Maternal Grandfather         Heart Attack   • Arthritis Paternal Grandmother    • Diabetes Paternal Grandmother         Type 2   • Alcohol abuse Paternal Grandfather    • Macular degeneration Paternal Grandfather    • Heart attack Paternal Grandfather    • Alcohol abuse Sister    • Depression Sister    • Alcohol abuse Sister    • Depression Sister    • Diabetes Daughter         T1       Social History     Socioeconomic History   • Marital status:      Spouse name: Not on file   • Number of children: Not on file   • Years of education: Not on file   • Highest education level: Not on file   Tobacco Use   • Smoking status: Former Smoker     Packs/day: 1.00     Years: 15.00     Pack years: 15.00     Types: Cigarettes   • Smokeless tobacco: Never Used   Substance and Sexual Activity   • Alcohol use: Yes     Alcohol/week: 2.0 standard drinks     Types: 2 Cans of beer per week   • Drug use: No   • Sexual activity: Not Currently     Partners: Male       Current Outpatient Medications on File Prior to Visit   Medication Sig Dispense Refill   • acetaminophen (TYLENOL) 500 MG tablet Take 1,000 mg by mouth every day.     • calcium carbonate EX (TUMS EX) 750 MG chewable tablet Chew 750 mg 4 (four) times a day.     • cholecalciferol (VITAMIN D3) 10 MCG (400 UNIT) tablet Take  by mouth 3 (Three) Times a Week.     • denosumab (PROLIA) 60 MG/ML solution syringe Inject 60  "mg under the skin every 6 (six) months.     • Flaxseed, Linseed, (FLAXSEED OIL) 1000 MG capsule Take  by mouth.     • levothyroxine (SYNTHROID, LEVOTHROID) 75 MCG tablet Take  by mouth.     • meloxicam (MOBIC) 15 MG tablet Take 1 tablet by mouth Daily. 30 tablet 2   • polycarbophil (FIBERCON) 625 MG tablet Take 625 mg by mouth daily.     • Probiotic Product (PROBIOTIC DAILY PO)      • vitamin B-12 (CYANOCOBALAMIN) 1000 MCG tablet Take  by mouth.     • NON FORMULARY Take 1 tablet by mouth Daily. Calcium, Vitamin D 1500 mg, Zinc     • [DISCONTINUED] guaiFENesin-codeine (GUAIFENESIN AC) 100-10 MG/5ML liquid Take 5 mL by mouth 3 (Three) Times a Day As Needed for Cough. 125 mL 0     Current Facility-Administered Medications on File Prior to Visit   Medication Dose Route Frequency Provider Last Rate Last Dose   • levalbuterol (XOPENEX) nebulizer solution 0.63 mg  0.63 mg Nebulization Q6H PRN Gladys Gutierrez MD   0.63 mg at 01/13/20 1605       Allergies   Allergen Reactions   • Sulfa Antibiotics Swelling     SWOLLEN LIPS   • Penicillins Rash       Immunization History   Administered Date(s) Administered   • Fluzone High Dose =>65 Years ([employer] ONLY) 09/18/2017, 09/29/2020   • Hepatitis A 09/10/2012, 04/04/2013   • Pneumococcal Conjugate 13-Valent (PCV13) 03/30/2018   • Pneumococcal Polysaccharide (PPSV23) 04/15/2019   • Shingrix 04/20/2019   • Tdap 09/10/2012   • Zostavax 09/10/2012       Objective     /74   Pulse 83   Ht 157.5 cm (62\")   Wt 49.9 kg (110 lb)   LMP  (LMP Unknown)   BMI 20.12 kg/m²     Physical Exam  Vitals signs and nursing note reviewed.   Constitutional:       Appearance: Normal appearance. She is well-developed.   HENT:      Head: Normocephalic and atraumatic.      Right Ear: Tympanic membrane and external ear normal.      Left Ear: Tympanic membrane and external ear normal.      Nose: Nose normal.      Mouth/Throat:      Mouth: Mucous membranes are moist.   Eyes:      Extraocular Movements: " Extraocular movements intact.      Pupils: Pupils are equal, round, and reactive to light.   Neck:      Musculoskeletal: Normal range of motion and neck supple.   Cardiovascular:      Rate and Rhythm: Normal rate and regular rhythm.      Heart sounds: Normal heart sounds.   Pulmonary:      Effort: Pulmonary effort is normal. No respiratory distress.      Breath sounds: Normal breath sounds.   Abdominal:      General: Abdomen is flat. Bowel sounds are normal.      Palpations: Abdomen is soft.   Musculoskeletal: Normal range of motion.   Skin:     General: Skin is warm and dry.   Neurological:      General: No focal deficit present.      Mental Status: She is alert and oriented to person, place, and time.   Psychiatric:         Mood and Affect: Mood normal.         Behavior: Behavior normal.         Thought Content: Thought content normal.         Judgment: Judgment normal.         Assessment/Plan   Diagnoses and all orders for this visit:    1. Healthcare maintenance (Primary)    2. Hearing loss, unspecified hearing loss type, unspecified laterality  -     Ambulatory Referral to Audiology    3. Age related osteoporosis, unspecified pathological fracture presence  -     DEXA Assess Fracture Vertebral    4. Bilateral hip pain  -     Ambulatory Referral to Sports Medicine    5. Neck pain on right side  -     Ambulatory Referral to Sports Medicine    6. Hypertension, unspecified type    7. Cervical cancer screening  -     Pap IG, HPV-hr    8. 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 Abnormal cervical Papanicolaou smear, unspecified abnormal pap finding  -     Pap IG, HPV-hr        Discussion    AWV.  See below for abdias history, PHQ-9, functional ability questionnaire, cognitive impairment screening.  Direct observation of cognitive abilities:  Normal. These were all reviewed with the patient and the patient was provided with a personal prevention plan of service in patient instructions.   Patient was given advice or handouts on the following topics:  nutrition, fall prevention, exercise.   ACP discussion was held with the patient during this visit. Patient has an advance directive in EMR which is still valid. .    I have recommended that the patient get the following immunizations: td.    Patient w/ hypothyroidism. Will contiue synthroid replacement therapy. She will get a DEXA to monitor bone health. bp elevated on arrival. Will test bp throughout the week and call w/ readings. Continue a healthy diet w/ routine fitness. She will f/u w/ sports med regarding neck and hip pain. She will go to audiology for hearing assessment.     Depression Screen:    PHQ-2/PHQ-9 Depression Screening 11/2/2020   Little interest or pleasure in doing things 0   Feeling down, depressed, or hopeless 0   Trouble falling or staying asleep, or sleeping too much -   Feeling tired or having little energy -   Poor appetite or overeating -   Feeling bad about yourself - or that you are a failure or have let yourself or your family down -   Trouble concentrating on things, such as reading the newspaper or watching television -   Moving or speaking so slowly that other people could have noticed. Or the opposite - being so fidgety or restless that you have been moving around a lot more than usual -   Thoughts that you would be better off dead, or of hurting yourself in some way -   Total Score 0   If you checked off any problems, how difficult have these problems made it for you to do your work, take care of things at home, or get along with other people? -       Fall Risk Screen:  STEADI Fall Risk Assessment was completed, and patient is at LOW risk for falls.Assessment completed on:11/2/2020    Health Habits and Functional/Cognitive screen:  Functional & Cognitive Status 4/15/2019   Do you have difficulty preparing food and eating? No   Do you have difficulty bathing yourself, getting dressed or grooming yourself? No   Do you  have difficulty using the toilet? No   Do you have difficulty moving around from place to place? No   Do you have trouble with steps or getting out of a bed or a chair? No   Current Diet Well Balanced Diet   Dental Exam Up to date   Eye Exam Up to date   Exercise (times per week) 7 times per week   Current Exercise Activities Include -   Do you need help using the phone?  No   Are you deaf or do you have serious difficulty hearing?  No   Do you need help with transportation? No   Do you need help shopping? No   Do you need help preparing meals?  No   Do you need help with housework?  No   Do you need help with laundry? No   Do you need help taking your medications? No   Do you need help managing money? No   Do you ever drive or ride in a car without wearing a seat belt? No   Have you felt unusual stress, anger or loneliness in the last month? No   Who do you live with? Spouse   If you need help, do you have trouble finding someone available to you? No   Have you been bothered in the last four weeks by sexual problems? No   Do you have difficulty concentrating, remembering or making decisions? No       Health Maintenance   Topic Date Due   • ZOSTER VACCINE (3 of 3) 06/15/2019   • DXA SCAN  05/03/2020   • MAMMOGRAM  05/23/2021   • ANNUAL WELLNESS VISIT  11/02/2021   • TDAP/TD VACCINES (2 - Td) 09/10/2022   • COLONOSCOPY  08/30/2027   • HEPATITIS C SCREENING  Completed   • INFLUENZA VACCINE  Completed   • Pneumococcal Vaccine 65+  Completed            Future Appointments   Date Time Provider Department Center   11/19/2020  2:30 PM REFERRED INJECTION CHAIR [facility]

## 2020-11-06 LAB
CYTOLOGIST CVX/VAG CYTO: NORMAL
CYTOLOGY CVX/VAG DOC CYTO: NORMAL
CYTOLOGY CVX/VAG DOC THIN PREP: NORMAL
DX ICD CODE: NORMAL
HIV 1 & 2 AB SER-IMP: NORMAL
HPV I/H RISK 1 DNA CVX QL PROBE+SIG AMP: NEGATIVE
Lab: NORMAL
OTHER STN SPEC: NORMAL
STAT OF ADQ CVX/VAG CYTO-IMP: NORMAL

## 2020-11-09 ENCOUNTER — TELEPHONE (OUTPATIENT)
Dept: INTERNAL MEDICINE | Facility: CLINIC | Age: 70
End: 2020-11-09

## 2020-11-09 ENCOUNTER — OFFICE VISIT (OUTPATIENT)
Dept: SPORTS MEDICINE | Facility: CLINIC | Age: 70
End: 2020-11-09

## 2020-11-09 VITALS
SYSTOLIC BLOOD PRESSURE: 130 MMHG | OXYGEN SATURATION: 99 % | DIASTOLIC BLOOD PRESSURE: 80 MMHG | HEART RATE: 68 BPM | HEIGHT: 62 IN | WEIGHT: 110 LBS | BODY MASS INDEX: 20.24 KG/M2

## 2020-11-09 DIAGNOSIS — M25.552 GREATER TROCHANTERIC PAIN SYNDROME OF BOTH LOWER EXTREMITIES: Primary | ICD-10-CM

## 2020-11-09 DIAGNOSIS — M25.551 GREATER TROCHANTERIC PAIN SYNDROME OF BOTH LOWER EXTREMITIES: Primary | ICD-10-CM

## 2020-11-09 DIAGNOSIS — M81.0 AGE-RELATED OSTEOPOROSIS WITHOUT CURRENT PATHOLOGICAL FRACTURE: Primary | ICD-10-CM

## 2020-11-09 DIAGNOSIS — M47.812 FACET ARTHRITIS OF CERVICAL REGION: ICD-10-CM

## 2020-11-09 PROCEDURE — 99214 OFFICE O/P EST MOD 30 MIN: CPT | Performed by: FAMILY MEDICINE

## 2020-11-09 PROCEDURE — 72040 X-RAY EXAM NECK SPINE 2-3 VW: CPT | Performed by: FAMILY MEDICINE

## 2020-11-09 NOTE — PROGRESS NOTES
"vEe is a 70 y.o. year old female evaluation of a problem that is new to this examiner.    Chief Complaint   Patient presents with   • Hip Pain     f/u for B/L hip bursitis    • Neck Pain     neck pain on the RT side - present for several months now - NKI - here for further evaluation and treatment        History of Present Illness   HPI   1.  For the past 2 months patient has been having a dull ache right posterior neck and occasionally to behind the right ear.  Is worse with head extension and rotation.  No radicular symptoms.  No fever chills.  2.  Patient was seen February of this year for bilateral greater trochanteric pain syndrome/bursitis was given corticosteroid injection and she states she did see some significant relief.  Has begun to note discomfort once again over the past month or 2, worse with transitioning from seated to standing and with sleeping on one side or another.  Right sooner than left.    I have reviewed the patient's medical, family, and social history in detail and updated the computerized patient record.    Review of Systems   Constitutional: Negative for fever.   Musculoskeletal:        Per HPI   Skin: Negative for wound.   Neurological: Negative for numbness.   All other systems reviewed and are negative.      /80 (BP Location: Left arm, Patient Position: Sitting, Cuff Size: Adult)   Pulse 68   Ht 157.5 cm (62.01\")   Wt 49.9 kg (110 lb)   LMP  (LMP Unknown)   SpO2 99%   BMI 20.11 kg/m²      Physical Exam  Vitals signs reviewed.   Constitutional:       Appearance: She is well-developed.   HENT:      Head: Normocephalic and atraumatic.   Eyes:      Conjunctiva/sclera: Conjunctivae normal.      Pupils: Pupils are equal, round, and reactive to light.   Cardiovascular:      Comments: No peripheral edema  Pulmonary:      Effort: Pulmonary effort is normal.   Musculoskeletal:      Comments: Cervical spine with some mild tightness of the paravertebral bundles on the right in the " right sternocleidomastoid muscle.  Can reproduce patient's discomfort with extension to the right.  Negative Spurling.    Tenderness to palpation over bilateral greater trochanteric area.   Skin:     General: Skin is warm and dry.   Neurological:      Mental Status: She is alert and oriented to person, place, and time.   Psychiatric:         Behavior: Behavior normal.     Cervical Spine X-Ray    Indication: Pain  Views: AP and Lateral    Findings:  Moderate severe degenerative changes between C4 and C6.  Facet arthritis.    No prior studies are available for comparison.        Current Outpatient Medications:   •  acetaminophen (TYLENOL) 500 MG tablet, Take 1,000 mg by mouth every day., Disp: , Rfl:   •  calcium carbonate EX (TUMS EX) 750 MG chewable tablet, Chew 750 mg 4 (four) times a day., Disp: , Rfl:   •  cholecalciferol (VITAMIN D3) 10 MCG (400 UNIT) tablet, Take  by mouth 3 (Three) Times a Week., Disp: , Rfl:   •  denosumab (PROLIA) 60 MG/ML solution syringe, Inject 60 mg under the skin every 6 (six) months., Disp: , Rfl:   •  Flaxseed, Linseed, (FLAXSEED OIL) 1000 MG capsule, Take  by mouth., Disp: , Rfl:   •  levothyroxine (SYNTHROID, LEVOTHROID) 75 MCG tablet, Take  by mouth., Disp: , Rfl:   •  meloxicam (MOBIC) 15 MG tablet, Take 1 tablet by mouth Daily., Disp: 30 tablet, Rfl: 2  •  NON FORMULARY, Take 1 tablet by mouth Daily. Calcium, Vitamin D 1500 mg, Zinc, Disp: , Rfl:   •  polycarbophil (FIBERCON) 625 MG tablet, Take 625 mg by mouth daily., Disp: , Rfl:   •  Probiotic Product (PROBIOTIC DAILY PO), , Disp: , Rfl:   •  vitamin B-12 (CYANOCOBALAMIN) 1000 MCG tablet, Take  by mouth., Disp: , Rfl:     Current Facility-Administered Medications:   •  levalbuterol (XOPENEX) nebulizer solution 0.63 mg, 0.63 mg, Nebulization, Q6H PRN, Gladys Gutierrez MD, 0.63 mg at 01/13/20 1605     Diagnoses and all orders for this visit:    Greater trochanteric pain syndrome of both lower extremities  -     Ambulatory  Referral to Physical Therapy Evaluate and treat    Facet arthritis of cervical region  -     XR Spine Cervical 2 or 3 View  -     Ambulatory Referral to Physical Therapy Evaluate and treat       Will try physical therapy for both her neck and her greater trochanteric pain.  If she is not able to tolerate physical therapy after 3-4 visits certainly can return here for GT PS injection.      EMR Dragon/Transcription disclaimer:    Much of this encounter note is an electronic transcription/translation of spoken language to printed text.  The electronic translation of spoken language may permit erroneous, or at times, nonsensical words or phrases to be inadvertently transcribed.  Although I have reviewed the note for such errors some may still exist.

## 2020-11-09 NOTE — TELEPHONE ENCOUNTER
MS. CARLOS IS WANTING TO KNOW WHEN SHE WILL BE SCHEDULED FOR HER DEXA SCAN, SAYS SHE HAS RECEIVED AN APPOINTMENT FROM ALL OTHER REFERRALS EXCEPT DEXA .    Eve Carlos ([facility]) [phone]

## 2020-11-19 ENCOUNTER — INFUSION (OUTPATIENT)
Dept: ONCOLOGY | Facility: HOSPITAL | Age: 70
End: 2020-11-19

## 2020-11-19 VITALS — TEMPERATURE: 97.3 F | HEIGHT: 62 IN | WEIGHT: 110 LBS | RESPIRATION RATE: 18 BRPM | BODY MASS INDEX: 20.24 KG/M2

## 2020-11-19 DIAGNOSIS — M81.0 SENILE OSTEOPOROSIS: Primary | ICD-10-CM

## 2020-11-19 PROCEDURE — 96372 THER/PROPH/DIAG INJ SC/IM: CPT

## 2020-11-19 PROCEDURE — 25010000002 DENOSUMAB 60 MG/ML SOLUTION PREFILLED SYRINGE: Performed by: INTERNAL MEDICINE

## 2020-11-19 RX ADMIN — DENOSUMAB 60 MG: 60 INJECTION SUBCUTANEOUS at 14:53

## 2020-11-30 ENCOUNTER — HOSPITAL ENCOUNTER (OUTPATIENT)
Dept: PHYSICAL THERAPY | Facility: HOSPITAL | Age: 70
Setting detail: THERAPIES SERIES
Discharge: HOME | End: 2020-11-30

## 2020-11-30 DIAGNOSIS — M54.2 CERVICALGIA: ICD-10-CM

## 2020-11-30 DIAGNOSIS — Z74.09 IMPAIRED MOBILITY: ICD-10-CM

## 2020-11-30 DIAGNOSIS — M70.62 GREATER TROCHANTERIC BURSITIS OF BOTH HIPS: ICD-10-CM

## 2020-11-30 DIAGNOSIS — M25.559 HIP PAIN: Primary | ICD-10-CM

## 2020-11-30 DIAGNOSIS — M54.50 CHRONIC BILATERAL LOW BACK PAIN WITHOUT SCIATICA: ICD-10-CM

## 2020-11-30 DIAGNOSIS — M70.61 GREATER TROCHANTERIC BURSITIS OF BOTH HIPS: ICD-10-CM

## 2020-11-30 DIAGNOSIS — G89.29 CHRONIC BILATERAL LOW BACK PAIN WITHOUT SCIATICA: ICD-10-CM

## 2020-11-30 PROCEDURE — 97530 THERAPEUTIC ACTIVITIES: CPT | Performed by: PHYSICAL THERAPIST

## 2020-11-30 PROCEDURE — 97162 PT EVAL MOD COMPLEX 30 MIN: CPT | Performed by: PHYSICAL THERAPIST

## 2020-12-04 ENCOUNTER — HOSPITAL ENCOUNTER (OUTPATIENT)
Dept: BONE DENSITY | Facility: HOSPITAL | Age: 70
Discharge: HOME | End: 2020-12-04
Admitting: INTERNAL MEDICINE

## 2020-12-04 PROCEDURE — 77080 DXA BONE DENSITY AXIAL: CPT

## 2020-12-04 RX ORDER — LOSARTAN POTASSIUM 25 MG/1
25 TABLET ORAL DAILY
Qty: 90 TABLET | Refills: 1 | Status: SHIPPED | OUTPATIENT
Start: 2020-12-04 | End: 2021-05-26

## 2020-12-07 ENCOUNTER — HOSPITAL ENCOUNTER (OUTPATIENT)
Dept: PHYSICAL THERAPY | Facility: HOSPITAL | Age: 70
Setting detail: THERAPIES SERIES
Discharge: HOME | End: 2020-12-07

## 2020-12-07 DIAGNOSIS — M54.2 CERVICALGIA: ICD-10-CM

## 2020-12-07 DIAGNOSIS — M54.50 CHRONIC BILATERAL LOW BACK PAIN WITHOUT SCIATICA: ICD-10-CM

## 2020-12-07 DIAGNOSIS — M70.61 GREATER TROCHANTERIC BURSITIS OF BOTH HIPS: ICD-10-CM

## 2020-12-07 DIAGNOSIS — G89.29 CHRONIC BILATERAL LOW BACK PAIN WITHOUT SCIATICA: ICD-10-CM

## 2020-12-07 DIAGNOSIS — Z74.09 IMPAIRED MOBILITY: ICD-10-CM

## 2020-12-07 DIAGNOSIS — M70.62 GREATER TROCHANTERIC BURSITIS OF BOTH HIPS: ICD-10-CM

## 2020-12-07 DIAGNOSIS — M25.559 HIP PAIN: Primary | ICD-10-CM

## 2020-12-07 PROCEDURE — 97140 MANUAL THERAPY 1/> REGIONS: CPT | Performed by: PHYSICAL THERAPIST

## 2020-12-07 PROCEDURE — 97110 THERAPEUTIC EXERCISES: CPT | Performed by: PHYSICAL THERAPIST

## 2020-12-07 NOTE — THERAPY TREATMENT NOTE
Outpatient Physical Therapy Ortho Treatment Note  [facility]     Patient Name: Eve Hurley  : 1950  MRN: [id number]  Today's Date: 2020      Visit Date: 2020    Visit Dx:    ICD-10-CM ICD-9-CM   1. Hip pain  M25.559 719.45   2. Chronic bilateral low back pain without sciatica  M54.5 724.2    G89.29 338.29   3. Cervicalgia  M54.2 723.1   4. Greater trochanteric bursitis of both hips  M70.61 726.5    M70.62    5. Impaired mobility  Z74.09 799.89       Patient Active Problem List   Diagnosis   • Hypothyroidism   • Arthralgia of hip   • Osteoporosis   • Hearing difficulty   • Rectal bleeding   • Diverticulitis of intestine without perforation or abscess without bleeding   • Senile osteoporosis   • Abnormal CT of the abdomen   • Buttock trauma   • Hematoma        Past Medical History:   Diagnosis Date   • Actinic keratoses    • Anemia    • Atypical chest pain 2009    SEEN AT [facility] ER   • Cataract    • DDD (degenerative disc disease), cervical    • Disease of thyroid gland     HYPOTHYROIDISM   • Disorder of cecum 2017    CT OF ABD AND PELVIS AT [facility] SHOWED THICKENING IN CECUM, POSSIBLE MASS   • Diverticulitis     MOST RECENTLY ON 19, HAS HAD 4-5 EPISODES IN PAST 20 YEARS.    • Diverticulosis    • Fall    • Fibrocystic breast    • Hearing difficulty    • Hip joint pain    • HL (hearing loss)    • Hypertension    • Hypothyroidism    • Irritable bowel syndrome    • Left lower quadrant pain 2004    CT SCAN AT [facility] SHOWED ESSENTIALLY NORMAL EXCEPT 2 VERY SMALL HEPATIC CYSTS AND CYSTIC STRUCTURE ON THE LEFT SIDE OF OF THE PELVIS REPRESENTING A 2CM OVARIAN CYST   • Lower extremity pain, left 06/15/2012    VENOUS DOPLER AT [facility] WAS WNL   • Osteopenia    • Osteoporosis     GETS PROLIA INJECTIONS   • Perforated ear drum 2014    MICROPERFORATION, LEFT EAR, SAW DR. GREGORY LAGOS   • Pneumonia    • PONV (postoperative nausea and vomiting)    • Rectal bleed 03/15/2016   • Renal  insufficiency 2016   • Tremor    • Urinary tract infection    • Vegetarian     VEGAN   • Visual impairment         Past Surgical History:   Procedure Laterality Date   • BREAST SURGERY Right     CYST ASPIRATION   •  SECTION N/A    • COLONOSCOPY N/A 2004    DIVERTICULOSIS, REDUNDANT SIGMOID, DR. KELLEN ROBERTS AT [place]   • COLONOSCOPY N/A 2017    Procedure: COLONOSCOPY INTO CECUM;  Surgeon: Marito Dunbar MD;  Location: [facility] ENDOSCOPY;  Service:    • HEMORRHOIDECTOMY N/A 2009    DR. KELLEN ROBERTS AT [place]   • TUBAL ABDOMINAL LIGATION Bilateral                        PT Assessment/Plan     Row Name 20 1309          PT Assessment    Assessment Comments  ms. Hurley returns for her first follow up for C spine pain and bilateral greater trochanteric bursitis.  We initiated strnghening of her hip girdle tissues today.  Did not issue for HEP yet, if she responds well, brandon issue next session. We initiated trial  of manual work to her C spine tissue, including c spine distraction. If she responds well to trial of manual c spine distraction, plan to progress to mechanical next session. Ms. Hurley contiunes to be a good candidate for skilled physical therapy.  -GJ        PT Plan    PT Plan Comments  assess response to strengthening exercises for hips and manual to c spine.  Continiue to progress hip girdle/core strengthening as able, issue HEP for home and likely initiate mechanical c spine traction next session (, 12 min.)  -       User Key  (r) = Recorded By, (t) = Taken By, (c) = Cosigned By    Initials Name Provider Type     Dante Purcell, PT Physical Therapist          Modalities     Row Name 20 1000             Moist Heat    MH Applied  --  -GJ      Location  --  -GJ      Rx Minutes  --  -GJ         Traction 86826    Traction Type  --  -GJ      Rx Minutes  --  -GJ      Duration  --  -GJ      Position  --  -GJ      Weight  --  -GJ      Hold  --  -GJ      Relax   --  -GJ        User Key  (r) = Recorded By, (t) = Taken By, (c) = Cosigned By    Initials Name Provider Type    Dante Corado, PT Physical Therapist        OP Exercises     Row Name 12/07/20 1046 12/07/20 1000          Subjective Comments    Subjective Comments  --  no change. I did order a new desk. I was pretty sore after our last session   -GJ        Total Minutes    56419 - PT Therapeutic Exercise Minutes  14  -GJ  --     92232 - PT Manual Therapy Minutes  26  -GJ  --        Exercise 1    Exercise Name 1  --  nustep  -GJ     Time 1  --  5 min  -GJ        Exercise 2    Exercise Name 2  --  standing HS curls   -GJ     Cueing 2  --  Verbal;Demo  -GJ     Reps 2  --  15, B  -GJ     Additional Comments  --  2#  -GJ        Exercise 3    Exercise Name 3  --  lateral stepping  -GJ     Cueing 3  --  Verbal;Demo  -GJ     Reps 3  --  3 laps  -GJ        Exercise 4    Exercise Name 4  --  monster walk, F/B  -GJ     Cueing 4  --  Verbal;Demo  -GJ     Reps 4  --  3 laps  -GJ     Additional Comments  --  YTB  -GJ        Exercise 5    Exercise Name 5  --  SL clam, B  -GJ     Cueing 5  --  Verbal;Demo  -GJ     Reps 5  --  12  -GJ     Time 5  --  3 s  -GJ        Exercise 6    Exercise Name 6  --  bridge  -GJ     Cueing 6  --  Verbal;Demo  -GJ     Reps 6  --  12  -GJ     Time 6  --  5 s  -GJ       User Key  (r) = Recorded By, (t) = Taken By, (c) = Cosigned By    Initials Name Provider Type    Dante Corado, PT Physical Therapist                      Manual Rx (last 36 hours)      Manual Treatments     Row Name 12/07/20 1100 12/07/20 1046          Total Minutes    50959 - PT Manual Therapy Minutes  --  26  -GJ        Manual Rx 1    Manual Rx 1 Location  PA mobs C spine, pt supine  -GJ  --     Manual Rx 1 Grade  grade II/III  -GJ  --        Manual Rx 2    Manual Rx 2 Location  manual c spine distraction,pt supine  -GJ  --        Manual Rx 3    Manual Rx 3 Location  stretch bilateral UT, bilateral pec minor tissues  -GJ  --        User Key  (r) = Recorded By, (t) = Taken By, (c) = Cosigned By    Initials Name Provider Type    Dante Corado, PT Physical Therapist          PT OP Goals     Row Name 12/07/20 1000          PT Short Term Goals    STG Date to Achieve  12/31/20  -GJ     STG 1  pt. to be I with initial HEP to facilitate self management of their condition  -GJ     STG 1 Progress  Ongoing  -GJ     STG 1 Progress Comments  initiated today  -GJ     STG 2  pt. to be educated in/verbalize understanding of the importance of posture/ergonomics in association with their condition to facilitate self management of their condition  -GJ     STG 2 Progress  Ongoing  -GJ        Long Term Goals    LTG Date to Achieve  02/26/21  -GJ     LTG 1  pt. to be I with advanced HEP to facilitate self management of their condition  -GJ     LTG 1 Progress  Ongoing  -GJ     LTG 2  pt. to report an to demonstrate decreased level of perceived disability  -GJ     LTG 2 Progress  Ongoing  -GJ     LTG 3  pt to report being able to walk 2-3 miles without pain afterward in her hip tissue to facilitate ease of fitness routine  -GJ     LTG 3 Progress  Ongoing  -GJ     LTG 4  pt to demonstrate R/L c spine AROM rotation >/= 55 degrees to facilitate ease/safety with driving  -GJ     LTG 4 Progress  Ongoing  -GJ       User Key  (r) = Recorded By, (t) = Taken By, (c) = Cosigned By    Initials Name Provider Type    Dante Corado, PT Physical Therapist          Therapy Education  Education Details: no HEP issued today, will likely issue next session if not too sore  Given: HEP, Symptoms/condition management, Mobility training, Posture/body mechanics  Program: New  How Provided: Verbal, Demonstration  Provided to: Patient  Level of Understanding: Teach back education performed, Verbalized, Demonstrated              Time Calculation:   Start Time: 1046  Stop Time: 1129  Time Calculation (min): 43 min  Therapy Charges for Today     Code Description Service Date  Service Provider Modifiers Qty    94056542162  PT THER PROC EA 15 MIN 12/7/2020 Dante Purcell, PT GP 1    [id number]  PT MANUAL THERAPY EA 15 MIN 12/7/2020 Dante Purcell, PT GP 2                    Dante Purcell, PT  12/7/2020

## 2020-12-09 ENCOUNTER — HOSPITAL ENCOUNTER (OUTPATIENT)
Dept: PHYSICAL THERAPY | Facility: HOSPITAL | Age: 70
Setting detail: THERAPIES SERIES
Discharge: HOME | End: 2020-12-09

## 2020-12-09 DIAGNOSIS — M70.61 GREATER TROCHANTERIC BURSITIS OF BOTH HIPS: ICD-10-CM

## 2020-12-09 DIAGNOSIS — M70.62 GREATER TROCHANTERIC BURSITIS OF BOTH HIPS: ICD-10-CM

## 2020-12-09 DIAGNOSIS — M54.50 CHRONIC BILATERAL LOW BACK PAIN WITHOUT SCIATICA: ICD-10-CM

## 2020-12-09 DIAGNOSIS — M54.2 CERVICALGIA: ICD-10-CM

## 2020-12-09 DIAGNOSIS — M25.559 HIP PAIN: Primary | ICD-10-CM

## 2020-12-09 DIAGNOSIS — Z74.09 IMPAIRED MOBILITY: ICD-10-CM

## 2020-12-09 DIAGNOSIS — G89.29 CHRONIC BILATERAL LOW BACK PAIN WITHOUT SCIATICA: ICD-10-CM

## 2020-12-09 PROCEDURE — 97110 THERAPEUTIC EXERCISES: CPT | Performed by: PHYSICAL THERAPIST

## 2020-12-09 PROCEDURE — 97012 MECHANICAL TRACTION THERAPY: CPT | Performed by: PHYSICAL THERAPIST

## 2020-12-09 NOTE — THERAPY TREATMENT NOTE
Outpatient Physical Therapy Ortho Treatment Note  [facility]     Patient Name: Eve Hurley  : 1950  MRN: [id number]  Today's Date: 2020      Visit Date: 2020    Visit Dx:    ICD-10-CM ICD-9-CM   1. Hip pain  M25.559 719.45   2. Chronic bilateral low back pain without sciatica  M54.5 724.2    G89.29 338.29   3. Cervicalgia  M54.2 723.1   4. Greater trochanteric bursitis of both hips  M70.61 726.5    M70.62    5. Impaired mobility  Z74.09 799.89       Patient Active Problem List   Diagnosis   • Hypothyroidism   • Arthralgia of hip   • Osteoporosis   • Hearing difficulty   • Rectal bleeding   • Diverticulitis of intestine without perforation or abscess without bleeding   • Senile osteoporosis   • Abnormal CT of the abdomen   • Buttock trauma   • Hematoma        Past Medical History:   Diagnosis Date   • Actinic keratoses    • Anemia    • Atypical chest pain 2009    SEEN AT [facility] ER   • Cataract    • DDD (degenerative disc disease), cervical    • Disease of thyroid gland     HYPOTHYROIDISM   • Disorder of cecum 2017    CT OF ABD AND PELVIS AT [facility] SHOWED THICKENING IN CECUM, POSSIBLE MASS   • Diverticulitis     MOST RECENTLY ON 19, HAS HAD 4-5 EPISODES IN PAST 20 YEARS.    • Diverticulosis    • Fall    • Fibrocystic breast    • Hearing difficulty    • Hip joint pain    • HL (hearing loss)    • Hypertension    • Hypothyroidism    • Irritable bowel syndrome    • Left lower quadrant pain 2004    CT SCAN AT [facility] SHOWED ESSENTIALLY NORMAL EXCEPT 2 VERY SMALL HEPATIC CYSTS AND CYSTIC STRUCTURE ON THE LEFT SIDE OF OF THE PELVIS REPRESENTING A 2CM OVARIAN CYST   • Lower extremity pain, left 06/15/2012    VENOUS DOPLER AT [facility] WAS WNL   • Osteopenia    • Osteoporosis     GETS PROLIA INJECTIONS   • Perforated ear drum 2014    MICROPERFORATION, LEFT EAR, SAW DR. GREGORY LAGOS   • Pneumonia    • PONV (postoperative nausea and vomiting)    • Rectal bleed 03/15/2016   • Renal  insufficiency 2016   • Tremor    • Urinary tract infection    • Vegetarian     VEGAN   • Visual impairment         Past Surgical History:   Procedure Laterality Date   • BREAST SURGERY Right     CYST ASPIRATION   •  SECTION N/A    • COLONOSCOPY N/A 2004    DIVERTICULOSIS, REDUNDANT SIGMOID, DR. KELLEN ROBERTS AT [place]   • COLONOSCOPY N/A 2017    Procedure: COLONOSCOPY INTO CECUM;  Surgeon: Marito Dunbar MD;  Location: [facility] ENDOSCOPY;  Service:    • HEMORRHOIDECTOMY N/A 2009    DR. KELLEN ROBERTS AT [place]   • TUBAL ABDOMINAL LIGATION Bilateral                        PT Assessment/Plan     Row Name 20 1400          PT Assessment    Assessment Comments  Ms. Hurley returns, reporting soreness of R hip for one day post therapy. We continued with strengthening of bilatteral hip girdle tissues, progressing resistance. Issued HEP for home, to be performed once every other day.  We initited trial of mechanical c spine traction.  Ms. Hurley continues to progress toward all functioanl goals and remains a good candidate for skilled physical therapy.  -GJ        PT Plan    PT Plan Comments  Assess response to mechanical C spine traction.  continue if beneficial.  Progress hip girlde/core strengthening as able,, standing shoulder ext in SLS, hip abd at wall in SLS?  -GJ       User Key  (r) = Recorded By, (t) = Taken By, (c) = Cosigned By    Initials Name Provider Type    Dante Corado, PT Physical Therapist          Modalities     Row Name 20 1300             Moist Heat    MH Applied  Yes  -GJ      Location  c spine during traction  -GJ      Rx Minutes  12 mins  -GJ         Traction 05316    Traction Type  Cervical  -GJ      Rx Minutes  12  -GJ      Duration  Intermittent  -GJ      Position  Other 90/90  -GJ      Weight  14 /7  -GJ      Hold  45  -GJ      Relax  10  -GJ        User Key  (r) = Recorded By, (t) = Taken By, (c) = Cosigned By    Initials Name Provider Type     GJ Dante Purcell, PT Physical Therapist        OP Exercises     Row Name 12/09/20 1314 12/09/20 1300          Subjective Comments    Subjective Comments  --  My R hip was sore for a day after last session, I had a tingling inbetween the shoulder blades after last sesion.   -GJ        Subjective Pain    Pre-Treatment Pain Level  --  4  -GJ     Subjective Pain Comment  --  R hip  -GJ        Total Minutes    50196 - PT Therapeutic Exercise Minutes  25  -GJ  --        Exercise 1    Exercise Name 1  --  nustep  -GJ     Time 1  --  5 min  -GJ     Additional Comments  --  UE/LE  -GJ        Exercise 2    Exercise Name 2  --  standing HS curls   -GJ     Cueing 2  --  Verbal;Demo  -GJ     Reps 2  --  15, B  -GJ     Additional Comments  --  2#, minimal UE asssist   -GJ        Exercise 3    Exercise Name 3  --  lateral stepping  -GJ     Cueing 3  --  Verbal;Demo  -GJ     Reps 3  --  3 laps  -GJ     Additional Comments  --  YTB  -GJ        Exercise 4    Exercise Name 4  --  monster walk, F/B  -GJ     Cueing 4  --  Verbal;Demo  -GJ     Reps 4  --  3 laps  -GJ     Additional Comments  --  YTB  -GJ        Exercise 5    Exercise Name 5  --  SL clam, B  -GJ     Cueing 5  --  Verbal;Demo  -GJ     Reps 5  --  12  -GJ     Time 5  --  3 s  -GJ     Additional Comments  --  YTB  -GJ        Exercise 6    Exercise Name 6  --  bridge  -GJ     Cueing 6  --  Verbal;Demo  -GJ     Reps 6  --  12  -GJ     Time 6  --  5 s  -GJ       User Key  (r) = Recorded By, (t) = Taken By, (c) = Cosigned By    Initials Name Provider Type    GJ Dante Purcell, PT Physical Therapist                       PT OP Goals     Row Name 12/09/20 1300          PT Short Term Goals    STG Date to Achieve  12/31/20  -GJ     STG 1  pt. to be I with initial HEP to facilitate self management of their condition  -GJ     STG 1 Progress  Met  -GJ     STG 2  pt. to be educated in/verbalize understanding of the importance of posture/ergonomics in association with their condition  to facilitate self management of their condition  -GJ     STG 2 Progress  Ongoing  -GJ     STG 2 Progress Comments  reviewed  -GJ        Long Term Goals    LTG Date to Achieve  02/26/21  -GJ     LTG 1  pt. to be I with advanced HEP to facilitate self management of their condition  -GJ     LTG 1 Progress  Ongoing  -GJ     LTG 2  pt. to report an LEFS >/=  90 to demonstrate decreased level of perceived disability  -GJ     LTG 2 Progress  Ongoing  -GJ     LTG 3  pt to report being able to walk 2-3 miles without pain afterward in her hip tissue to facilitate ease of fitness routine  -GJ     LTG 3 Progress  Ongoing  -GJ     LTG 4  pt to demonstrate R/L c spine AROM rotation >/= 55 degrees to facilitate ease/safety with driving  -GJ     LTG 4 Progress  Ongoing  -GJ       User Key  (r) = Recorded By, (t) = Taken By, (c) = Cosigned By    Initials Name Provider Type    Dante Corado, PT Physical Therapist          Therapy Education  Education Details: Access Code: [email or web address], issued for home today, HEP once every other day  Given: HEP, Symptoms/condition management, Pain management, Mobility training, Posture/body mechanics  Program: Reinforced, Progressed  How Provided: Verbal, Demonstration, Written  Provided to: Patient  Level of Understanding: Teach back education performed, Verbalized, Demonstrated              Time Calculation:   Start Time: 1315  Stop Time: 1410  Time Calculation (min): 55 min  Therapy Charges for Today     Code Description Service Date Service Provider Modifiers Qty    [id number]  PT THER PROC EA 15 MIN 12/9/2020 Dante Purcell, PT GP 2    [id number] HC PT HOT OR COLD PACK TREAT MCARE 12/9/2020 Dante Purcell, PT GP 1    [id number] HC PT TRACTION CERVICAL 12/9/2020 Dante Purcell, PT GP 1                    Dante Purcell, PT  12/9/2020

## 2020-12-15 ENCOUNTER — APPOINTMENT (OUTPATIENT)
Dept: WOMENS IMAGING | Facility: HOSPITAL | Age: 70
End: 2020-12-15

## 2020-12-15 PROCEDURE — 77063 BREAST TOMOSYNTHESIS BI: CPT | Performed by: RADIOLOGY

## 2020-12-15 PROCEDURE — 77067 SCR MAMMO BI INCL CAD: CPT | Performed by: RADIOLOGY

## 2020-12-16 ENCOUNTER — HOSPITAL ENCOUNTER (OUTPATIENT)
Dept: PHYSICAL THERAPY | Facility: HOSPITAL | Age: 70
Setting detail: THERAPIES SERIES
Discharge: HOME | End: 2020-12-16

## 2020-12-16 DIAGNOSIS — M25.559 HIP PAIN: Primary | ICD-10-CM

## 2020-12-16 DIAGNOSIS — M70.61 GREATER TROCHANTERIC BURSITIS OF BOTH HIPS: ICD-10-CM

## 2020-12-16 DIAGNOSIS — M54.50 CHRONIC BILATERAL LOW BACK PAIN WITHOUT SCIATICA: ICD-10-CM

## 2020-12-16 DIAGNOSIS — G89.29 CHRONIC BILATERAL LOW BACK PAIN WITHOUT SCIATICA: ICD-10-CM

## 2020-12-16 DIAGNOSIS — M54.2 CERVICALGIA: ICD-10-CM

## 2020-12-16 DIAGNOSIS — Z74.09 IMPAIRED MOBILITY: ICD-10-CM

## 2020-12-16 DIAGNOSIS — M70.62 GREATER TROCHANTERIC BURSITIS OF BOTH HIPS: ICD-10-CM

## 2020-12-16 PROCEDURE — 97012 MECHANICAL TRACTION THERAPY: CPT | Performed by: PHYSICAL THERAPIST

## 2020-12-16 PROCEDURE — 97110 THERAPEUTIC EXERCISES: CPT | Performed by: PHYSICAL THERAPIST

## 2020-12-16 NOTE — THERAPY TREATMENT NOTE
Outpatient Physical Therapy Ortho Treatment Note  [facility]     Patient Name: Eve Hurley  : 1950  MRN: [id number]  Today's Date: 2020      Visit Date: 2020    Visit Dx:    ICD-10-CM ICD-9-CM   1. Hip pain  M25.559 719.45   2. Chronic bilateral low back pain without sciatica  M54.5 724.2    G89.29 338.29   3. Cervicalgia  M54.2 723.1   4. Greater trochanteric bursitis of both hips  M70.61 726.5    M70.62    5. Impaired mobility  Z74.09 799.89       Patient Active Problem List   Diagnosis   • Hypothyroidism   • Arthralgia of hip   • Osteoporosis   • Hearing difficulty   • Rectal bleeding   • Diverticulitis of intestine without perforation or abscess without bleeding   • Senile osteoporosis   • Abnormal CT of the abdomen   • Buttock trauma   • Hematoma        Past Medical History:   Diagnosis Date   • Actinic keratoses    • Anemia    • Atypical chest pain 2009    SEEN AT [facility] ER   • Cataract    • DDD (degenerative disc disease), cervical    • Disease of thyroid gland     HYPOTHYROIDISM   • Disorder of cecum 2017    CT OF ABD AND PELVIS AT [facility] SHOWED THICKENING IN CECUM, POSSIBLE MASS   • Diverticulitis     MOST RECENTLY ON 19, HAS HAD 4-5 EPISODES IN PAST 20 YEARS.    • Diverticulosis    • Fall    • Fibrocystic breast    • Hearing difficulty    • Hip joint pain    • HL (hearing loss)    • Hypertension    • Hypothyroidism    • Irritable bowel syndrome    • Left lower quadrant pain 2004    CT SCAN AT [facility] SHOWED ESSENTIALLY NORMAL EXCEPT 2 VERY SMALL HEPATIC CYSTS AND CYSTIC STRUCTURE ON THE LEFT SIDE OF OF THE PELVIS REPRESENTING A 2CM OVARIAN CYST   • Lower extremity pain, left 06/15/2012    VENOUS DOPLER AT [facility] WAS WNL   • Osteopenia    • Osteoporosis     GETS PROLIA INJECTIONS   • Perforated ear drum 2014    MICROPERFORATION, LEFT EAR, SAW DR. GREGORY LAGOS   • Pneumonia    • PONV (postoperative nausea and vomiting)    • Rectal bleed 03/15/2016   • Renal  insufficiency 2016   • Tremor    • Urinary tract infection    • Vegetarian     VEGAN   • Visual impairment         Past Surgical History:   Procedure Laterality Date   • BREAST SURGERY Right     CYST ASPIRATION   •  SECTION N/A    • COLONOSCOPY N/A 2004    DIVERTICULOSIS, REDUNDANT SIGMOID, DR. KELLEN ROBERTS AT [place]   • COLONOSCOPY N/A 2017    Procedure: COLONOSCOPY INTO CECUM;  Surgeon: Marito Dunbar MD;  Location: [facility] ENDOSCOPY;  Service:    • HEMORRHOIDECTOMY N/A 2009    DR. KELLEN ROBERTS AT [place]   • TUBAL ABDOMINAL LIGATION Bilateral                        PT Assessment/Plan     Row Name 20 0914          PT Assessment    Assessment Comments  ms. Hurley returns for c spine/biltarel hip pain.  She is reporting ease of symptoms in her cervical spine tissues, believing the traction was beneficial. She also reports some improvement in hip pain post walk. We were able to progress hip girdle/core strengthening activities today, did not issue new exercises for home today, will consider adding to home next session.  Increaesed total time on c spine traction to 15 min.  Ms. Hurley has met all STG's and is progressing toward all remainging  goals. She remains a good candidate for skilled physical therapy.  -GJ        PT Plan    PT Plan Comments  continue to work hip girdle strengthening/core strengthening, remeasure c spine rotation, continue traction  -GJ       User Key  (r) = Recorded By, (t) = Taken By, (c) = Cosigned By    Initials Name Provider Type    Dante Corado, PT Physical Therapist          Modalities     Row Name 20 0700             Moist Heat    MH Applied  Yes  -GJ      Location  c spine during traction  -GJ      Rx Minutes  15 mins  -GJ         Traction 68890    Traction Type  Cervical  -GJ      Rx Minutes  15  -GJ      Duration  Intermittent  -GJ      Position  Other 90/90  -GJ      Weight  14 /7  -GJ      Hold  45  -GJ      Relax  10  -GJ       Progression  -- increased total time to 15 min from 12  -GJ        User Key  (r) = Recorded By, (t) = Taken By, (c) = Cosigned By    Initials Name Provider Type    GJ Dante Purcell, PT Physical Therapist        OP Exercises     Row Name 12/16/20 0831 12/16/20 0800          Subjective Comments    Subjective Comments  --  I do think the traction helped somewhat. I did have a little tingling between my shoulder blades but it didn't last long.  I forgot to ice after a brisk walk, and think I noticed I did ok. I found the exercises for my hips were hard at home, that suprised me  -GJ        Total Minutes    80275 - PT Therapeutic Exercise Minutes  29  -GJ  --        Exercise 1    Exercise Name 1  --  nustep  -GJ     Time 1  --  5 min  -GJ     Additional Comments  --  UE/LE  -GJ        Exercise 2    Exercise Name 2  --  standing HS curls   -GJ     Cueing 2  --  Verbal;Demo  -GJ     Reps 2  --  15, B  -GJ     Additional Comments  --  2#, minimal UE asssist   -GJ        Exercise 3    Exercise Name 3  --  lateral stepping  -GJ     Cueing 3  --  Verbal;Demo  -GJ     Reps 3  --  3 laps  -GJ     Additional Comments  --  RTB  -GJ        Exercise 4    Exercise Name 4  --  monster walk, F/B  -GJ     Reps 4  --  3 laps  -GJ     Additional Comments  --  RTB  -GJ        Exercise 5    Exercise Name 5  --  SL clam, B  -GJ     Cueing 5  --  Verbal;Demo  -GJ     Reps 5  --  12  -GJ     Time 5  --  3 s  -GJ     Additional Comments  --  RTB  -GJ        Exercise 6    Exercise Name 6  --  bridge  -GJ     Cueing 6  --  Verbal;Demo  -GJ     Reps 6  --  12  -GJ     Time 6  --  5 s  -GJ        Exercise 7    Exercise Name 7  --  SLS shoulder ext  -GJ     Cueing 7  --  Verbal;Demo  -GJ     Sets 7  --  2  -GJ     Reps 7  --  10, each foot  -GJ     Additional Comments  --  RTB, focus on level pelvis, slow/controlled movement  -GJ        Exercise 8    Exercise Name 8  --  reverse SL clam, B  -GJ     Cueing 8  --  Verbal;Demo  -GJ     Reps 8  --   12, B  -GJ       User Key  (r) = Recorded By, (t) = Taken By, (c) = Cosigned By    Initials Name Provider Type    Dante Corado, PT Physical Therapist                       PT OP Goals     Row Name 12/16/20 0800          PT Short Term Goals    STG Date to Achieve  12/31/20  -GJ     STG 1  pt. to be I with initial HEP to facilitate self management of their condition  -GJ     STG 1 Progress  Met  -GJ     STG 2  pt. to be educated in/verbalize understanding of the importance of posture/ergonomics in association with their condition to facilitate self management of their condition  -GJ     STG 2 Progress  Met  -GJ        Long Term Goals    LTG Date to Achieve  02/26/21  -GJ     LTG 1  pt. to be I with advanced HEP to facilitate self management of their condition  -GJ     LTG 1 Progress  Ongoing  -GJ     LTG 2  pt. to report an LEFS >/=  90 to demonstrate decreased level of perceived disability  -GJ     LTG 2 Progress  Ongoing  -GJ     LTG 3  pt to report being able to walk 2-3 miles without pain afterward in her hip tissue to facilitate ease of fitness routine  -GJ     LTG 3 Progress  Ongoing  -GJ     LTG 4  pt to demonstrate R/L c spine AROM rotation >/= 55 degrees to facilitate ease/safety with driving  -GJ     LTG 4 Progress  Ongoing  -GJ       User Key  (r) = Recorded By, (t) = Taken By, (c) = Cosigned By    Initials Name Provider Type    Dante Corado, PT Physical Therapist          Therapy Education  Education Details: did not issue new exercises for home today, will consider next session  Given: HEP, Symptoms/condition management, Pain management, Mobility training, Posture/body mechanics  Program: New, Reinforced, Progressed  How Provided: Verbal, Demonstration  Provided to: Patient  Level of Understanding: Teach back education performed, Verbalized, Demonstrated              Time Calculation:   Start Time: 0831  Stop Time: 0920  Time Calculation (min): 49 min  Therapy Charges for Today     Code  Description Service Date Service Provider Modifiers Qty    91068326638 HC PT THER PROC EA 15 MIN 12/16/2020 Dante Purcell, PT GP 2    57128070118 HC PT HOT OR COLD PACK TREAT MCARE 12/16/2020 Dante Purcell, PT GP 1    92226327338 HC PT TRACTION CERVICAL 12/16/2020 Dante Purcell, PT GP 1                    Dante Purcell, PT  12/16/2020

## 2020-12-18 ENCOUNTER — HOSPITAL ENCOUNTER (OUTPATIENT)
Dept: PHYSICAL THERAPY | Facility: HOSPITAL | Age: 70
Setting detail: THERAPIES SERIES
Discharge: HOME | End: 2020-12-18

## 2020-12-18 DIAGNOSIS — M54.2 CERVICALGIA: ICD-10-CM

## 2020-12-18 DIAGNOSIS — M70.61 GREATER TROCHANTERIC BURSITIS OF BOTH HIPS: ICD-10-CM

## 2020-12-18 DIAGNOSIS — M54.50 CHRONIC BILATERAL LOW BACK PAIN WITHOUT SCIATICA: ICD-10-CM

## 2020-12-18 DIAGNOSIS — Z74.09 IMPAIRED MOBILITY: ICD-10-CM

## 2020-12-18 DIAGNOSIS — G89.29 CHRONIC BILATERAL LOW BACK PAIN WITHOUT SCIATICA: ICD-10-CM

## 2020-12-18 DIAGNOSIS — M25.559 HIP PAIN: Primary | ICD-10-CM

## 2020-12-18 DIAGNOSIS — M70.62 GREATER TROCHANTERIC BURSITIS OF BOTH HIPS: ICD-10-CM

## 2020-12-18 NOTE — THERAPY TREATMENT NOTE
Outpatient Physical Therapy Ortho Treatment Note  [facility]     Patient Name: Eve Hurley  : 1950  MRN: [id number]  Today's Date: 2020      Visit Date: 2020    Visit Dx:    ICD-10-CM ICD-9-CM   1. Hip pain  M25.559 719.45   2. Chronic bilateral low back pain without sciatica  M54.5 724.2    G89.29 338.29   3. Cervicalgia  M54.2 723.1   4. Greater trochanteric bursitis of both hips  M70.61 726.5    M70.62    5. Impaired mobility  Z74.09 799.89       Patient Active Problem List   Diagnosis   • Hypothyroidism   • Arthralgia of hip   • Osteoporosis   • Hearing difficulty   • Rectal bleeding   • Diverticulitis of intestine without perforation or abscess without bleeding   • Senile osteoporosis   • Abnormal CT of the abdomen   • Buttock trauma   • Hematoma        Past Medical History:   Diagnosis Date   • Actinic keratoses    • Anemia    • Atypical chest pain 2009    SEEN AT [facility] ER   • Cataract    • DDD (degenerative disc disease), cervical    • Disease of thyroid gland     HYPOTHYROIDISM   • Disorder of cecum 2017    CT OF ABD AND PELVIS AT [facility] SHOWED THICKENING IN CECUM, POSSIBLE MASS   • Diverticulitis     MOST RECENTLY ON 19, HAS HAD 4-5 EPISODES IN PAST 20 YEARS.    • Diverticulosis    • Fall    • Fibrocystic breast    • Hearing difficulty    • Hip joint pain    • HL (hearing loss)    • Hypertension    • Hypothyroidism    • Irritable bowel syndrome    • Left lower quadrant pain 2004    CT SCAN AT [facility] SHOWED ESSENTIALLY NORMAL EXCEPT 2 VERY SMALL HEPATIC CYSTS AND CYSTIC STRUCTURE ON THE LEFT SIDE OF OF THE PELVIS REPRESENTING A 2CM OVARIAN CYST   • Lower extremity pain, left 06/15/2012    VENOUS DOPLER AT [facility] WAS WNL   • Osteopenia    • Osteoporosis     GETS PROLIA INJECTIONS   • Perforated ear drum 2014    MICROPERFORATION, LEFT EAR, SAW DR. GREGORY LAGOS   • Pneumonia    • PONV (postoperative nausea and vomiting)    • Rectal bleed 03/15/2016   • Renal  insufficiency 2016   • Tremor    • Urinary tract infection    • Vegetarian     VEGAN   • Visual impairment         Past Surgical History:   Procedure Laterality Date   • BREAST SURGERY Right     CYST ASPIRATION   •  SECTION N/A    • COLONOSCOPY N/A 2004    DIVERTICULOSIS, REDUNDANT SIGMOID, DR. KELLEN ROBERTS AT [place]   • COLONOSCOPY N/A 2017    Procedure: COLONOSCOPY INTO CECUM;  Surgeon: Marito Dunbar MD;  Location: [facility] ENDOSCOPY;  Service:    • HEMORRHOIDECTOMY N/A 2009    DR. KELLEN ROBERTS AT [place]   • TUBAL ABDOMINAL LIGATION Bilateral 1986       PT Ortho     Row Name 20 1000       Cervical/Shoulder ROM Screen    Cervical rotation  -- L60, R 35, post DDN R 45  -      User Key  (r) = Recorded By, (t) = Taken By, (c) = Cosigned By    Initials Name Provider Type    Dante Corado, PT Physical Therapist                      PT Assessment/Plan     Row Name 20 1045          PT Assessment    Assessment Comments  Ms. Hurley returns, reporting feeling as if she has better motion but still has R sided c spine (continues with increased computer work). We decided to trial DDN of R UT, R leator tissues, resulting in multiple LTR's and improved C spine AROM rotation (pre/post DDn assessed). She improved R rotation by 15 degrees without thoracic compensation post.  Ms. Hurley is progressing toward functional goals and remains a good candidate for skilled physical therapy.  -GJ        PT Plan    PT Plan Comments  assess response to DDN, likely add supine chin tuck over, SL trunk rotation, ? resume c spine traction, continue to work on hip girdle strength  -       User Key  (r) = Recorded By, (t) = Taken By, (c) = Cosigned By    Initials Name Provider Type    Dante Corado, PT Physical Therapist            OP Exercises     Row Name 20 1000             Subjective Comments    Subjective Comments  the traction feels good, but not sure It is helping. I feel like  I do have improved range, but still have pain along the R side of my neck.  I have had a lot more computer time  -        User Key  (r) = Recorded By, (t) = Taken By, (c) = Cosigned By    Initials Name Provider Type    Dante Corado, PT Physical Therapist                      Manual Rx (last 36 hours)      Manual Treatments     Row Name 12/18/20 0900             Manual Rx 4    Manual Rx 4 Location  intramuscular manual therapy  -GJ Assessed pt. for Dry Needling and intramuscular manual therapy. Discussed risks/benefits of Dry Needling with pt, including but not limited to muscle soreness, bruising, vasovagal response, pneumothorax, nerve injury. Patient signed written consent to proceed with treatment.    Patient position during treatment: prone, nose in nose hole  Muscles treated: R UT, R levator scapula  Response to treatment: multiple moderate to large LTRs, Improved AROM c spine rotation to the R post DDN. No immediate adverse response.     palpation used before, during, and after to facilitate assessment Clean needle technique observed at all times, precautions for lung fields, neurovascular structures observed.    DDN performed by Dante Purcell II, PT, DPT     Manual Rx 4 Type  R Ut/levator   -GJ        User Key  (r) = Recorded By, (t) = Taken By, (c) = Cosigned By    Initials Name Provider Type    Dante Corado, PT Physical Therapist          PT OP Goals     Row Name 12/18/20 1000          PT Short Term Goals    STG Date to Achieve  12/31/20  -GJ     STG 1  pt. to be I with initial HEP to facilitate self management of their condition  -GJ     STG 1 Progress  Met  -GJ     STG 2  pt. to be educated in/verbalize understanding of the importance of posture/ergonomics in association with their condition to facilitate self management of their condition  -GJ     STG 2 Progress  Met  -GJ        Long Term Goals    LTG Date to Achieve  02/26/21  -GJ     LTG 1  pt. to be I with advanced HEP to facilitate  self management of their condition  -GJ     LTG 1 Progress  Ongoing  -GJ     LTG 2  pt. to report an LEFS >/=  90 to demonstrate decreased level of perceived disability  -GJ     LTG 2 Progress  Ongoing  -GJ     LTG 3  pt to report being able to walk 2-3 miles without pain afterward in her hip tissue to facilitate ease of fitness routine  -GJ     LTG 3 Progress  Ongoing  -GJ     LTG 4  pt to demonstrate R/L c spine AROM rotation >/= 55 degrees to facilitate ease/safety with driving  -GJ     LTG 4 Progress  Partially Met  -GJ     LTG 4 Progress Comments  L 60, R 45 (post DDN)  -       User Key  (r) = Recorded By, (t) = Taken By, (c) = Cosigned By    Initials Name Provider Type     Dante Purcell, PT Physical Therapist          Therapy Education  Education Details: reviewed ergonoimc considerations, change of position frequently especially while on computer. discussed risks/benefits of DDN  Given: Symptoms/condition management, Pain management, Mobility training, Posture/body mechanics  Program: Reinforced  How Provided: Verbal  Provided to: Patient  Level of Understanding: Verbalized              Time Calculation:   Start Time: 1000  Stop Time: 1035  Time Calculation (min): 35 min  Therapy Charges for Today     Code Description Service Date Service Provider Modifiers Qty    [id number]  PT SELF PAY DRY NEEDLING SESSION 12/18/2020 Dante Purcell, PT  1                    Dante Purcell, PT  12/18/2020

## 2020-12-28 ENCOUNTER — HOSPITAL ENCOUNTER (OUTPATIENT)
Dept: PHYSICAL THERAPY | Facility: HOSPITAL | Age: 70
Setting detail: THERAPIES SERIES
Discharge: HOME | End: 2020-12-28

## 2020-12-28 DIAGNOSIS — M54.2 CERVICALGIA: ICD-10-CM

## 2020-12-28 DIAGNOSIS — M54.50 CHRONIC BILATERAL LOW BACK PAIN WITHOUT SCIATICA: Primary | ICD-10-CM

## 2020-12-28 DIAGNOSIS — M25.559 HIP PAIN: ICD-10-CM

## 2020-12-28 DIAGNOSIS — G89.29 CHRONIC BILATERAL LOW BACK PAIN WITHOUT SCIATICA: Primary | ICD-10-CM

## 2020-12-28 PROCEDURE — 97140 MANUAL THERAPY 1/> REGIONS: CPT

## 2020-12-28 PROCEDURE — 97110 THERAPEUTIC EXERCISES: CPT

## 2020-12-28 NOTE — THERAPY PROGRESS REPORT/RE-CERT
Outpatient Physical Therapy Ortho Progress Note  [facility]     Patient Name: Eve Hurley  : 1950  MRN: [id number]  Today's Date: 2020      Visit Date: 2020    Visit Dx:    ICD-10-CM ICD-9-CM   1. Chronic bilateral low back pain without sciatica  M54.5 724.2    G89.29 338.29   2. Hip pain  M25.559 719.45   3. Cervicalgia  M54.2 723.1       Patient Active Problem List   Diagnosis   • Hypothyroidism   • Arthralgia of hip   • Osteoporosis   • Hearing difficulty   • Rectal bleeding   • Diverticulitis of intestine without perforation or abscess without bleeding   • Senile osteoporosis   • Abnormal CT of the abdomen   • Buttock trauma   • Hematoma        Past Medical History:   Diagnosis Date   • Actinic keratoses    • Anemia    • Atypical chest pain 2009    SEEN AT [facility] ER   • Cataract    • DDD (degenerative disc disease), cervical    • Disease of thyroid gland     HYPOTHYROIDISM   • Disorder of cecum 2017    CT OF ABD AND PELVIS AT [facility] SHOWED THICKENING IN CECUM, POSSIBLE MASS   • Diverticulitis     MOST RECENTLY ON 19, HAS HAD 4-5 EPISODES IN PAST 20 YEARS.    • Diverticulosis    • Fall    • Fibrocystic breast    • Hearing difficulty    • Hip joint pain    • HL (hearing loss)    • Hypertension    • Hypothyroidism    • Irritable bowel syndrome    • Left lower quadrant pain 2004    CT SCAN AT [facility] SHOWED ESSENTIALLY NORMAL EXCEPT 2 VERY SMALL HEPATIC CYSTS AND CYSTIC STRUCTURE ON THE LEFT SIDE OF OF THE PELVIS REPRESENTING A 2CM OVARIAN CYST   • Lower extremity pain, left 06/15/2012    VENOUS DOPLER AT [facility] WAS WNL   • Osteopenia    • Osteoporosis     GETS PROLIA INJECTIONS   • Perforated ear drum 2014    MICROPERFORATION, LEFT EAR, SAW DR. GREGORY LAGOS   • Pneumonia    • PONV (postoperative nausea and vomiting)    • Rectal bleed 03/15/2016   • Renal insufficiency 2016   • Tremor    • Urinary tract infection    • Vegetarian     VEGAN   • Visual impairment          Past Surgical History:   Procedure Laterality Date   • BREAST SURGERY Right     CYST ASPIRATION   •  SECTION N/A    • COLONOSCOPY N/A 2004    DIVERTICULOSIS, REDUNDANT SIGMOID, DR. KELLEN ROBERTS AT [place]   • COLONOSCOPY N/A 2017    Procedure: COLONOSCOPY INTO CECUM;  Surgeon: Marito Dunbar MD;  Location: [facility] ENDOSCOPY;  Service:    • HEMORRHOIDECTOMY N/A 2009    DR. KELLEN ROBERTS AT [place]   • TUBAL ABDOMINAL LIGATION Bilateral 1986       PT Ortho     Row Name 20 1100       Cervical/Shoulder ROM Screen    Cervical rotation  -- R 0-45, L 0-55  -RS      User Key  (r) = Recorded By, (t) = Taken By, (c) = Cosigned By    Initials Name Provider Type    RS Neetu Shabazz PT Physical Therapist                      PT Assessment/Plan     Row Name 20 1100          PT Assessment    Functional Limitations  Decreased safety during functional activities;Impaired gait;Limitations in community activities;Performance in leisure activities;Performance in sport activities  -RS     Impairments  Gait;Impaired flexibility;Impaired muscle endurance;Impaired muscle length;Impaired muscle power;Impaired postural alignment;Joint mobility;Muscle strength;Pain;Poor body mechanics;Posture;Range of motion  -RS     Assessment Comments  The pt has been seen by skilled PT for hip pain as well as R sided neck pain, she reports 50% improvement in hip pain and minimal change in neck pain. She has met  2/2 STG and is progressing 4/4 LTG. She voices good understanding of HEP. Added supine chin tuck with good tolerance, perform manual cervical traction, Pt with inc TTP R>L suboccipital muscles. she remains a good candidate for skilled PT.  -RS     Please refer to paper survey for additional self-reported information  Yes  -RS     Rehab Potential  Good  -RS     Patient/caregiver participated in establishment of treatment plan and goals  Yes  -RS     Patient would benefit from skilled therapy  intervention  Yes  -RS        PT Plan    PT Plan Comments  Continue skilled PT focused on functional pelvic girdle strengthening as well as improved cervical/thoracic mobility. Schedule?  -RS       User Key  (r) = Recorded By, (t) = Taken By, (c) = Cosigned By    Initials Name Provider Type    RS Neetu Shabazz, PT Physical Therapist            OP Exercises     Row Name 12/28/20 1000 12/28/20 0900          Subjective Comments    Subjective Comments  The pt reports her pain has moved to more of the center, the muscle is still tendner from the needling. She feltsoreness for a couple days after needling last time.  -RS  --        Subjective Pain    Able to rate subjective pain?  yes  -RS  --     Pre-Treatment Pain Level  3  -RS  --     Subjective Pain Comment  feeling 50% better with the hip, less than that with the neck  -RS  --        Total Minutes    06621 - PT Therapeutic Exercise Minutes  25  -RS  --     22151 - PT Manual Therapy Minutes  --  15  -RS        Exercise 1    Exercise Name 1  nustep  -RS  --     Time 1  5 min  -RS  --     Additional Comments  LE only  -RS  --        Exercise 2    Exercise Name 2  standing HS curls   -RS  --     Cueing 2  Verbal;Demo  -RS  --     Reps 2  15, B  -RS  --        Exercise 3    Exercise Name 3  lateral stepping  -RS  --     Cueing 3  Verbal;Demo  -RS  --     Reps 3  3 laps  -RS  --        Exercise 4    Exercise Name 4  monster walk, F/B  -RS  --     Reps 4  3 laps  -RS  --        Exercise 5    Exercise Name 5  SL clam, B  -RS  --     Cueing 5  Verbal;Demo  -RS  --     Reps 5  15  -RS  --     Time 5  3 s  -RS  --        Exercise 6    Exercise Name 6  bridge  -RS  --     Cueing 6  Verbal;Demo  -RS  --     Reps 6  12  -RS  --     Time 6  5 s  -RS  --     Additional Comments  RTB  -RS  --        Exercise 7    Exercise Name 7  SLS shoulder ext  -RS  --     Cueing 7  Verbal;Demo  -RS  --     Sets 7  2  -RS  --     Reps 7  10, each foot  -RS  --        Exercise 8    Exercise  Name 8  reverse SL clam, B  -RS  --     Cueing 8  Verbal;Demo  -RS  --     Reps 8  15, B  -RS  --        Exercise 9    Exercise Name 9  supine chin tuck  -RS  --     Cueing 9  Verbal;Demo  -RS  --     Reps 9  10  -RS  --     Time 9  5s  -RS  --       User Key  (r) = Recorded By, (t) = Taken By, (c) = Cosigned By    Initials Name Provider Type    RS Neetu Shabazz, PT Physical Therapist                      Manual Rx (last 36 hours)      Manual Treatments     Row Name 12/28/20 0900             Total Minutes    06588 - PT Manual Therapy Minutes  15  -RS         Manual Rx 1    Manual Rx 1 Location  PA mobs C spine, pt supine  -RS      Manual Rx 1 Grade  grade II/III  -RS         Manual Rx 2    Manual Rx 2 Location  manual c spine distraction,pt supine  -RS      Manual Rx 2 Type  OA nod with OP  -RS         Manual Rx 3    Manual Rx 3 Location  stretch bilateral UT, bilateral pec minor tissues  -RS        User Key  (r) = Recorded By, (t) = Taken By, (c) = Cosigned By    Initials Name Provider Type    RS Neetu Shabazz, PT Physical Therapist          PT OP Goals     Row Name 12/28/20 1100          PT Short Term Goals    STG Date to Achieve  12/31/20  -RS     STG 1  pt. to be I with initial HEP to facilitate self management of their condition  -RS     STG 1 Progress  Met  -RS     STG 2  pt. to be educated in/verbalize understanding of the importance of posture/ergonomics in association with their condition to facilitate self management of their condition  -RS     STG 2 Progress  Met  -RS        Long Term Goals    LTG Date to Achieve  02/26/21  -RS     LTG 1  pt. to be I with advanced HEP to facilitate self management of their condition  -RS     LTG 1 Progress  Ongoing;Progressing  -RS     LTG 1 Progress Comments  cont to prgress as appropraite  -RS     LTG 2  pt. to report an LEFS >/=  90 to demonstrate decreased level of perceived disability  -RS     LTG 2 Progress  Progressing;Partially Met  -RS     LTG 2 Progress  Comments  scores 72/80=90%  -RS     LTG 3  pt to report being able to walk 2-3 miles without pain afterward in her hip tissue to facilitate ease of fitness routine  -RS     LTG 3 Progress  Ongoing  -RS     LTG 3 Progress Comments  walks 2 miles every day, has pain after, specifically at night  -RS     LTG 4  pt to demonstrate R/L c spine AROM rotation >/= 55 degrees to facilitate ease/safety with driving  -RS     LTG 4 Progress  Partially Met  -RS     LTG 4 Progress Comments  45 deg R  -RS       User Key  (r) = Recorded By, (t) = Taken By, (c) = Cosigned By    Initials Name Provider Type    Neetu Hatfield, PT Physical Therapist               Outcome Measure Options: Lower Extremity Functional Scale (LEFS)  Lower Extremity Functional Index  Any of your usual work, housework or school activities: No difficulty  Your usual hobbies, recreational or sporting activities: A little bit of difficulty  Getting into or out of the bath: No difficulty  Walking between rooms: No difficulty  Putting on your shoes or socks: No difficulty  Squatting: A little bit of difficulty  Lifting an object, like a bag of groceries from the floor: No difficulty  Performing light activities around your home: No difficulty  Performing heavy activities around your home: No difficulty  Getting into or out of a car: A little bit of difficulty  Walking 2 blocks: No difficulty  Walking a mile: A little bit of difficulty  Going up or down 10 stairs (about 1 flight of stairs): A little bit of difficulty  Standing for 1 hour: No difficulty  Sitting for 1 hour: No difficulty  Running on even ground: No difficulty  Running on uneven ground: A little bit of difficulty  Making sharp turns while running fast: No difficulty  Hopping: A little bit of difficulty  Rolling over in bed: A little bit of difficulty  Total: 72      Time Calculation:   Start Time: 1100  Stop Time: 1144  Time Calculation (min): 44 min  Therapy Charges for Today     Code  Description Service Date Service Provider Modifiers Qty    09526539102 HC PT THER PROC EA 15 MIN 12/28/2020 Neetu Shabazz, PT GP 2    77327607141 HC PT MANUAL THERAPY EA 15 MIN 12/28/2020 Neetu Shabazz, PT GP 1          PT G-Codes  Outcome Measure Options: Lower Extremity Functional Scale (LEFS)  Total: 72         Neetu Shabazz, PT  12/28/2020

## 2021-01-11 ENCOUNTER — HOSPITAL ENCOUNTER (OUTPATIENT)
Dept: PHYSICAL THERAPY | Facility: HOSPITAL | Age: 71
Setting detail: THERAPIES SERIES
Discharge: HOME | End: 2021-01-11

## 2021-01-11 DIAGNOSIS — M70.62 GREATER TROCHANTERIC BURSITIS OF BOTH HIPS: ICD-10-CM

## 2021-01-11 DIAGNOSIS — M54.2 CERVICALGIA: ICD-10-CM

## 2021-01-11 DIAGNOSIS — M25.559 HIP PAIN: ICD-10-CM

## 2021-01-11 DIAGNOSIS — Z74.09 IMPAIRED MOBILITY: ICD-10-CM

## 2021-01-11 DIAGNOSIS — M70.61 GREATER TROCHANTERIC BURSITIS OF BOTH HIPS: ICD-10-CM

## 2021-01-11 DIAGNOSIS — G89.29 CHRONIC BILATERAL LOW BACK PAIN WITHOUT SCIATICA: Primary | ICD-10-CM

## 2021-01-11 DIAGNOSIS — M54.50 CHRONIC BILATERAL LOW BACK PAIN WITHOUT SCIATICA: Primary | ICD-10-CM

## 2021-01-11 PROCEDURE — 97110 THERAPEUTIC EXERCISES: CPT | Performed by: PHYSICAL THERAPIST

## 2021-01-11 NOTE — THERAPY TREATMENT NOTE
Outpatient Physical Therapy Ortho Treatment Note  [facility]     Patient Name: Eve Hurley  : 1950  MRN: [id number]  Today's Date: 2021      Visit Date: 2021    Visit Dx:    ICD-10-CM ICD-9-CM   1. Chronic bilateral low back pain without sciatica  M54.5 724.2    G89.29 338.29   2. Hip pain  M25.559 719.45   3. Cervicalgia  M54.2 723.1   4. Greater trochanteric bursitis of both hips  M70.61 726.5    M70.62    5. Impaired mobility  Z74.09 799.89       Patient Active Problem List   Diagnosis   • Hypothyroidism   • Arthralgia of hip   • Osteoporosis   • Hearing difficulty   • Rectal bleeding   • Diverticulitis of intestine without perforation or abscess without bleeding   • Senile osteoporosis   • Abnormal CT of the abdomen   • Buttock trauma   • Hematoma        Past Medical History:   Diagnosis Date   • Actinic keratoses    • Anemia    • Atypical chest pain 2009    SEEN AT [facility] ER   • Cataract    • DDD (degenerative disc disease), cervical    • Disease of thyroid gland     HYPOTHYROIDISM   • Disorder of cecum 2017    CT OF ABD AND PELVIS AT [facility] SHOWED THICKENING IN CECUM, POSSIBLE MASS   • Diverticulitis     MOST RECENTLY ON 19, HAS HAD 4-5 EPISODES IN PAST 20 YEARS.    • Diverticulosis    • Fall    • Fibrocystic breast    • Hearing difficulty    • Hip joint pain    • HL (hearing loss)    • Hypertension    • Hypothyroidism    • Irritable bowel syndrome    • Left lower quadrant pain 2004    CT SCAN AT [facility] SHOWED ESSENTIALLY NORMAL EXCEPT 2 VERY SMALL HEPATIC CYSTS AND CYSTIC STRUCTURE ON THE LEFT SIDE OF OF THE PELVIS REPRESENTING A 2CM OVARIAN CYST   • Lower extremity pain, left 06/15/2012    VENOUS DOPLER AT [facility] WAS WNL   • Osteopenia    • Osteoporosis     GETS PROLIA INJECTIONS   • Perforated ear drum 2014    MICROPERFORATION, LEFT EAR, SAW DR. GREGORY LAGOS   • Pneumonia    • PONV (postoperative nausea and vomiting)    • Rectal bleed 03/15/2016   • Renal  insufficiency 2016   • Tremor    • Urinary tract infection    • Vegetarian     VEGAN   • Visual impairment         Past Surgical History:   Procedure Laterality Date   • BREAST SURGERY Right     CYST ASPIRATION   •  SECTION N/A    • COLONOSCOPY N/A 2004    DIVERTICULOSIS, REDUNDANT SIGMOID, DR. KELLEN ROBERTS AT [place]   • COLONOSCOPY N/A 2017    Procedure: COLONOSCOPY INTO CECUM;  Surgeon: Marito Dunbar MD;  Location: [facility] ENDOSCOPY;  Service:    • HEMORRHOIDECTOMY N/A 2009    DR. KELLEN ROBERTS AT [place]   • TUBAL ABDOMINAL LIGATION Bilateral                        PT Assessment/Plan     Row Name 21 0740          PT Assessment    Assessment Comments  Ms. Hurley reports nice resolution of her neck pain, somewhat improvement in her hip pain.  We reviewed physiology of healing bernardo for her hip strength.  We added several strengthening activities and updated her HEP. She has one additional session of physical therapy prior to likely to independent management.  -GJ        PT Plan    PT Plan Comments  likeky DC to HEP following next session, ? consider lateral planks  -GJ       User Key  (r) = Recorded By, (t) = Taken By, (c) = Cosigned By    Initials Name Provider Type    Dante Corado, PT Physical Therapist            OP Exercises     Row Name 21 0703 21 0700          Subjective Comments    Subjective Comments  --  With my neck, it is much better, the major pain is gone. I still notice it when I am at the computer too long.  My hip, sometimes I feel it is better but then I don't.  I'm at a place of acceptance with my hip.   -GJ        Total Minutes    63033 - PT Therapeutic Exercise Minutes  45  -GJ  --        Exercise 1    Exercise Name 1  --  recumbent bike  -GJ     Time 1  --  5 min  -GJ     Additional Comments  --  --  -GJ        Exercise 2    Exercise Name 2  --  standing HS curls   -GJ     Cueing 2  --  Verbal;Demo  -GJ     Sets 2  --  2  -GJ     Reps  2  --  10, B  -GJ     Additional Comments  --  3#  -GJ        Exercise 3    Exercise Name 3  --  lateral stepping  -GJ     Cueing 3  --  Verbal;Demo  -GJ     Reps 3  --  3 laps  -GJ     Additional Comments  --  RTB  -GJ        Exercise 4    Exercise Name 4  --  monster walk, F/B  -GJ     Cueing 4  --  Verbal;Demo  -GJ     Reps 4  --  3 laps  -GJ     Additional Comments  --  RTB  -GJ        Exercise 5    Exercise Name 5  --  SL clam, B  -GJ     Cueing 5  --  Verbal;Demo  -GJ     Reps 5  --  15  -GJ     Time 5  --  3 s  -GJ     Additional Comments  --  RTB  -GJ        Exercise 6    Exercise Name 6  --  bridge  -GJ     Cueing 6  --  Verbal;Demo  -GJ     Reps 6  --  12  -GJ     Time 6  --  5 s  -GJ     Additional Comments  --  RTB  -GJ        Exercise 7    Exercise Name 7  --  SLS shoulder ext  -GJ     Cueing 7  --  Verbal;Demo  -GJ     Sets 7  --  2  -GJ     Reps 7  --  10, each foot  -GJ     Additional Comments  --  RTB, focus on level pelvis, slow/controlled movement  -GJ        Exercise 8    Exercise Name 8  --  reverse SL clam, B  -GJ     Cueing 8  --  Verbal;Demo  -GJ     Reps 8  --  15, B  -GJ     Additional Comments  --  RTB  -GJ        Exercise 9    Exercise Name 9  --  supine chin tuck  -GJ     Cueing 9  --  Verbal;Demo  -GJ     Reps 9  --  10  -GJ     Time 9  --  5s  -GJ        Exercise 10    Exercise Name 10  --  Y balance/toe tap in stance  -GJ     Cueing 10  --  Verbal;Demo  -GJ     Reps 10  --  10, B  -GJ       User Key  (r) = Recorded By, (t) = Taken By, (c) = Cosigned By    Initials Name Provider Type    GJ Dante Purcell W, PT Physical Therapist                       PT OP Goals     Row Name 01/11/21 0700          PT Short Term Goals    STG Date to Achieve  12/31/20  -GJ     STG 1  pt. to be I with initial HEP to facilitate self management of their condition  -GJ     STG 1 Progress  Met  -GJ     STG 2  pt. to be educated in/verbalize understanding of the importance of posture/ergonomics in association  with their condition to facilitate self management of their condition  -GJ     STG 2 Progress  Met  -GJ        Long Term Goals    LTG Date to Achieve  02/26/21  -GJ     LTG 1  pt. to be I with advanced HEP to facilitate self management of their condition  -GJ     LTG 1 Progress  Ongoing;Progressing  -GJ     LTG 2  pt. to report an LEFS >/=  90 to demonstrate decreased level of perceived disability  -GJ     LTG 2 Progress  Progressing;Partially Met  -GJ     LTG 3  pt to report being able to walk 2-3 miles without pain afterward in her hip tissue to facilitate ease of fitness routine  -GJ     LTG 3 Progress  Ongoing  -GJ     LTG 4  pt to demonstrate R/L c spine AROM rotation >/= 55 degrees to facilitate ease/safety with driving  -GJ     LTG 4 Progress  Partially Met  -GJ       User Key  (r) = Recorded By, (t) = Taken By, (c) = Cosigned By    Initials Name Provider Type    Dante Corado, PT Physical Therapist          Therapy Education  Education Details: [id number] discussed physiolgy and time frame for strengthening, bernardo. given previous history of hip pain/bursitis/injections/HS strain  Given: HEP, Symptoms/condition management, Pain management, Mobility training, Posture/body mechanics  Program: Reinforced, New, Progressed  How Provided: Verbal, Demonstration, Written  Provided to: Patient  Level of Understanding: Teach back education performed, Verbalized, Demonstrated              Time Calculation:   Start Time: 0703  Stop Time: 0750  Time Calculation (min): 47 min  Therapy Charges for Today     Code Description Service Date Service Provider Modifiers Qty    63539714614 HC PT THER PROC EA 15 MIN 1/11/2021 Dante Purcell, PT GP 3                    Dante Purcell, PT  1/11/2021

## 2021-02-02 ENCOUNTER — APPOINTMENT (OUTPATIENT)
Dept: PHYSICAL THERAPY | Facility: HOSPITAL | Age: 71
End: 2021-02-02

## 2021-03-19 ENCOUNTER — BULK ORDERING (OUTPATIENT)
Dept: CASE MANAGEMENT | Facility: OTHER | Age: 71
End: 2021-03-19

## 2021-03-19 DIAGNOSIS — Z23 IMMUNIZATION DUE: ICD-10-CM

## 2021-05-13 DIAGNOSIS — R79.89 ABNORMAL CBC: ICD-10-CM

## 2021-05-13 DIAGNOSIS — M81.0 AGE-RELATED OSTEOPOROSIS WITHOUT CURRENT PATHOLOGICAL FRACTURE: Primary | ICD-10-CM

## 2021-05-18 LAB
ALBUMIN SERPL-MCNC: 4.7 G/DL (ref 3.5–5.2)
ALBUMIN/GLOB SERPL: 2 G/DL
ALP SERPL-CCNC: 39 U/L (ref 39–117)
ALT SERPL-CCNC: 12 U/L (ref 1–33)
AST SERPL-CCNC: 19 U/L (ref 1–32)
BASOPHILS # BLD AUTO: 0.08 10*3/MM3 (ref 0–0.2)
BASOPHILS NFR BLD AUTO: 0.9 % (ref 0–1.5)
BILIRUB SERPL-MCNC: 0.4 MG/DL (ref 0–1.2)
BUN SERPL-MCNC: 13 MG/DL (ref 8–23)
BUN/CREAT SERPL: 17.3 (ref 7–25)
CALCIUM SERPL-MCNC: 10 MG/DL (ref 8.6–10.5)
CHLORIDE SERPL-SCNC: 104 MMOL/L (ref 98–107)
CO2 SERPL-SCNC: 28.1 MMOL/L (ref 22–29)
CREAT SERPL-MCNC: 0.75 MG/DL (ref 0.57–1)
EOSINOPHIL # BLD AUTO: 0.15 10*3/MM3 (ref 0–0.4)
EOSINOPHIL NFR BLD AUTO: 1.7 % (ref 0.3–6.2)
ERYTHROCYTE [DISTWIDTH] IN BLOOD BY AUTOMATED COUNT: 12.4 % (ref 12.3–15.4)
GLOBULIN SER CALC-MCNC: 2.3 GM/DL
GLUCOSE SERPL-MCNC: 81 MG/DL (ref 65–99)
HCT VFR BLD AUTO: 41.4 % (ref 34–46.6)
HGB BLD-MCNC: 13.5 G/DL (ref 12–15.9)
IMM GRANULOCYTES # BLD AUTO: 0.06 10*3/MM3 (ref 0–0.05)
IMM GRANULOCYTES NFR BLD AUTO: 0.7 % (ref 0–0.5)
LYMPHOCYTES # BLD AUTO: 2.2 10*3/MM3 (ref 0.7–3.1)
LYMPHOCYTES NFR BLD AUTO: 24.6 % (ref 19.6–45.3)
MAGNESIUM SERPL-MCNC: 2.2 MG/DL (ref 1.6–2.4)
MCH RBC QN AUTO: 30.9 PG (ref 26.6–33)
MCHC RBC AUTO-ENTMCNC: 32.6 G/DL (ref 31.5–35.7)
MCV RBC AUTO: 94.7 FL (ref 79–97)
MONOCYTES # BLD AUTO: 0.51 10*3/MM3 (ref 0.1–0.9)
MONOCYTES NFR BLD AUTO: 5.7 % (ref 5–12)
NEUTROPHILS # BLD AUTO: 5.95 10*3/MM3 (ref 1.7–7)
NEUTROPHILS NFR BLD AUTO: 66.4 % (ref 42.7–76)
NRBC BLD AUTO-RTO: 0 /100 WBC (ref 0–0.2)
PHOSPHATE SERPL-MCNC: 3.6 MG/DL (ref 2.5–4.5)
PLATELET # BLD AUTO: 405 10*3/MM3 (ref 140–450)
POTASSIUM SERPL-SCNC: 4.9 MMOL/L (ref 3.5–5.2)
PROT SERPL-MCNC: 7 G/DL (ref 6–8.5)
RBC # BLD AUTO: 4.37 10*6/MM3 (ref 3.77–5.28)
SODIUM SERPL-SCNC: 142 MMOL/L (ref 136–145)
WBC # BLD AUTO: 8.95 10*3/MM3 (ref 3.4–10.8)

## 2021-05-24 ENCOUNTER — APPOINTMENT (OUTPATIENT)
Dept: ONCOLOGY | Facility: HOSPITAL | Age: 71
End: 2021-05-24

## 2021-05-26 RX ORDER — LOSARTAN POTASSIUM 25 MG/1
25 TABLET ORAL DAILY
Qty: 90 TABLET | Refills: 1 | Status: SHIPPED | OUTPATIENT
Start: 2021-05-26 | End: 2021-09-03

## 2021-06-07 ENCOUNTER — INFUSION (OUTPATIENT)
Dept: ONCOLOGY | Facility: HOSPITAL | Age: 71
End: 2021-06-07

## 2021-06-07 ENCOUNTER — TELEPHONE (OUTPATIENT)
Dept: INTERNAL MEDICINE | Facility: CLINIC | Age: 71
End: 2021-06-07

## 2021-06-07 VITALS — WEIGHT: 110.2 LBS | BODY MASS INDEX: 20.16 KG/M2 | TEMPERATURE: 98.2 F

## 2021-06-07 DIAGNOSIS — M81.0 SENILE OSTEOPOROSIS: Primary | ICD-10-CM

## 2021-06-07 PROCEDURE — 25010000002 DENOSUMAB 60 MG/ML SOLUTION PREFILLED SYRINGE: Performed by: INTERNAL MEDICINE

## 2021-06-07 PROCEDURE — 96372 THER/PROPH/DIAG INJ SC/IM: CPT

## 2021-06-07 RX ADMIN — DENOSUMAB 60 MG: 60 INJECTION SUBCUTANEOUS at 15:01

## 2021-09-03 RX ORDER — LOSARTAN POTASSIUM 25 MG/1
25 TABLET ORAL DAILY
Qty: 90 TABLET | Refills: 1 | Status: SHIPPED | OUTPATIENT
Start: 2021-09-03 | End: 2022-03-03 | Stop reason: SDUPTHER

## 2021-10-26 DIAGNOSIS — E03.9 HYPOTHYROIDISM, UNSPECIFIED TYPE: ICD-10-CM

## 2021-10-26 DIAGNOSIS — Z00.00 HEALTH MAINTENANCE EXAMINATION: Primary | ICD-10-CM

## 2021-11-04 LAB
ALBUMIN SERPL-MCNC: 4.7 G/DL (ref 3.8–4.8)
ALBUMIN/GLOB SERPL: 2 {RATIO} (ref 1.2–2.2)
ALP SERPL-CCNC: 35 IU/L (ref 44–121)
ALT SERPL-CCNC: 11 IU/L (ref 0–32)
APPEARANCE UR: CLEAR
AST SERPL-CCNC: 19 IU/L (ref 0–40)
BACTERIA #/AREA URNS HPF: NORMAL /[HPF]
BASOPHILS # BLD AUTO: 0.1 X10E3/UL (ref 0–0.2)
BASOPHILS NFR BLD AUTO: 1 %
BILIRUB SERPL-MCNC: 0.4 MG/DL (ref 0–1.2)
BILIRUB UR QL STRIP: NEGATIVE
BUN SERPL-MCNC: 12 MG/DL (ref 8–27)
BUN/CREAT SERPL: 13 (ref 12–28)
CALCIUM SERPL-MCNC: 9.6 MG/DL (ref 8.7–10.3)
CASTS URNS QL MICRO: NORMAL /LPF
CHLORIDE SERPL-SCNC: 101 MMOL/L (ref 96–106)
CHOLEST SERPL-MCNC: 209 MG/DL (ref 100–199)
CO2 SERPL-SCNC: 22 MMOL/L (ref 20–29)
COLOR UR: YELLOW
CREAT SERPL-MCNC: 0.9 MG/DL (ref 0.57–1)
EOSINOPHIL # BLD AUTO: 0.2 X10E3/UL (ref 0–0.4)
EOSINOPHIL NFR BLD AUTO: 2 %
EPI CELLS #/AREA URNS HPF: NORMAL /HPF (ref 0–10)
ERYTHROCYTE [DISTWIDTH] IN BLOOD BY AUTOMATED COUNT: 13 % (ref 11.7–15.4)
GLOBULIN SER CALC-MCNC: 2.4 G/DL (ref 1.5–4.5)
GLUCOSE SERPL-MCNC: 90 MG/DL (ref 65–99)
GLUCOSE UR QL: NEGATIVE
HCT VFR BLD AUTO: 40.6 % (ref 34–46.6)
HDLC SERPL-MCNC: 118 MG/DL
HGB BLD-MCNC: 13.4 G/DL (ref 11.1–15.9)
HGB UR QL STRIP: NEGATIVE
IMM GRANULOCYTES # BLD AUTO: 0.1 X10E3/UL (ref 0–0.1)
IMM GRANULOCYTES NFR BLD AUTO: 1 %
KETONES UR QL STRIP: NEGATIVE
LDLC SERPL CALC-MCNC: 78 MG/DL (ref 0–99)
LEUKOCYTE ESTERASE UR QL STRIP: NEGATIVE
LYMPHOCYTES # BLD AUTO: 2.5 X10E3/UL (ref 0.7–3.1)
LYMPHOCYTES NFR BLD AUTO: 30 %
MCH RBC QN AUTO: 30.8 PG (ref 26.6–33)
MCHC RBC AUTO-ENTMCNC: 33 G/DL (ref 31.5–35.7)
MCV RBC AUTO: 93 FL (ref 79–97)
MICRO URNS: NORMAL
MICRO URNS: NORMAL
MONOCYTES # BLD AUTO: 0.7 X10E3/UL (ref 0.1–0.9)
MONOCYTES NFR BLD AUTO: 8 %
NEUTROPHILS # BLD AUTO: 4.8 X10E3/UL (ref 1.4–7)
NEUTROPHILS NFR BLD AUTO: 58 %
NITRITE UR QL STRIP: NEGATIVE
PH UR STRIP: 6.5 [PH] (ref 5–7.5)
PLATELET # BLD AUTO: 424 X10E3/UL (ref 150–450)
POTASSIUM SERPL-SCNC: 4.3 MMOL/L (ref 3.5–5.2)
PROT SERPL-MCNC: 7.1 G/DL (ref 6–8.5)
PROT UR QL STRIP: NEGATIVE
RBC # BLD AUTO: 4.35 X10E6/UL (ref 3.77–5.28)
RBC #/AREA URNS HPF: NORMAL /HPF (ref 0–2)
SODIUM SERPL-SCNC: 140 MMOL/L (ref 134–144)
SP GR UR: 1.01 (ref 1–1.03)
TRIGL SERPL-MCNC: 77 MG/DL (ref 0–149)
TSH SERPL DL<=0.005 MIU/L-ACNC: 1.54 UIU/ML (ref 0.45–4.5)
URINALYSIS REFLEX: NORMAL
UROBILINOGEN UR STRIP-MCNC: 0.2 MG/DL (ref 0.2–1)
VLDLC SERPL CALC-MCNC: 13 MG/DL (ref 5–40)
WBC # BLD AUTO: 8.4 X10E3/UL (ref 3.4–10.8)
WBC #/AREA URNS HPF: NORMAL /HPF (ref 0–5)

## 2021-11-05 ENCOUNTER — OFFICE VISIT (OUTPATIENT)
Dept: INTERNAL MEDICINE | Facility: CLINIC | Age: 71
End: 2021-11-05

## 2021-11-05 VITALS
WEIGHT: 110 LBS | SYSTOLIC BLOOD PRESSURE: 130 MMHG | HEIGHT: 62 IN | HEART RATE: 66 BPM | DIASTOLIC BLOOD PRESSURE: 78 MMHG | BODY MASS INDEX: 20.24 KG/M2

## 2021-11-05 DIAGNOSIS — I10 HYPERTENSION, UNSPECIFIED TYPE: ICD-10-CM

## 2021-11-05 DIAGNOSIS — E03.9 HYPOTHYROIDISM, UNSPECIFIED TYPE: ICD-10-CM

## 2021-11-05 DIAGNOSIS — Z00.00 HEALTHCARE MAINTENANCE: Primary | ICD-10-CM

## 2021-11-05 DIAGNOSIS — Z12.31 ENCOUNTER FOR SCREENING MAMMOGRAM FOR BREAST CANCER: ICD-10-CM

## 2021-11-05 DIAGNOSIS — M81.0 AGE-RELATED OSTEOPOROSIS WITHOUT CURRENT PATHOLOGICAL FRACTURE: ICD-10-CM

## 2021-11-05 PROCEDURE — 1126F AMNT PAIN NOTED NONE PRSNT: CPT | Performed by: INTERNAL MEDICINE

## 2021-11-05 PROCEDURE — 1159F MED LIST DOCD IN RCRD: CPT | Performed by: INTERNAL MEDICINE

## 2021-11-05 PROCEDURE — 1170F FXNL STATUS ASSESSED: CPT | Performed by: INTERNAL MEDICINE

## 2021-11-05 PROCEDURE — 90662 IIV NO PRSV INCREASED AG IM: CPT | Performed by: INTERNAL MEDICINE

## 2021-11-05 PROCEDURE — 96160 PT-FOCUSED HLTH RISK ASSMT: CPT | Performed by: INTERNAL MEDICINE

## 2021-11-05 PROCEDURE — 99214 OFFICE O/P EST MOD 30 MIN: CPT | Performed by: INTERNAL MEDICINE

## 2021-11-05 PROCEDURE — G0008 ADMIN INFLUENZA VIRUS VAC: HCPCS | Performed by: INTERNAL MEDICINE

## 2021-11-05 PROCEDURE — G0439 PPPS, SUBSEQ VISIT: HCPCS | Performed by: INTERNAL MEDICINE

## 2021-11-05 RX ORDER — DESONIDE 0.5 MG/G
1 OINTMENT TOPICAL DAILY
Qty: 30 G | Refills: 2 | Status: SHIPPED | OUTPATIENT
Start: 2021-11-05 | End: 2022-02-01 | Stop reason: SDUPTHER

## 2021-11-05 NOTE — PROGRESS NOTES
The ABCs of the Annual Wellness Visit  Subsequent Medicare Wellness Visit    Chief Complaint   Patient presents with   • Annual Exam   • Hypertension   • Hypothyroidism   • Osteoporosis      Subjective    History of Present Illness:  Eve Hurley is a 71 y.o. female who presents for a Subsequent Medicare Wellness Visit and to review chronic issues. She is doing well today. She has osteoporosis. She has had a notable improvement on prolia injections. She has been euthyroid w/ synthroid replacement therapy. Cbc, cmp, tsh, u/a, all reviewed and at goal level. bp remains normotensive. She engages in good self care w/ fitness and nutrition. 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 In general this is good but can be taxing at times.       The following portions of the patient's history were reviewed and   updated as appropriate: allergies, current medications, past family history, past medical history, past social history, past surgical history and problem list.    Compared to one year ago, the patient feels her physical   health is the same.    Compared to one year ago, the patient feels her mental   health is the same.    Patient has seen eye, dental, and derm in the last one year.   cscope 2017 w. Repeat due 10 years.       Recent Hospitalizations:  She was not admitted to the hospital during the last year.       Current Medical Providers:  Patient Care Team:  Gladys Gutierrez MD as PCP - General  Gladys Gutierrez MD as PCP - Family Medicine  Demarcus Dupree MD as Consulting Physician (Endocrinology)    Outpatient Medications Prior to Visit   Medication Sig Dispense Refill   • acetaminophen (TYLENOL) 500 MG tablet Take 1,000 mg by mouth every day.     • calcium carbonate EX (TUMS EX) 750 MG chewable tablet Chew 750 mg 4 (four) times a day.     • cholecalciferol (VITAMIN D3) 10 MCG (400 UNIT) tablet Take  by mouth 3 (Three) Times a Week.     • denosumab (PROLIA) 60 MG/ML solution  "syringe Inject 60 mg under the skin every 6 (six) months.     • Flaxseed, Linseed, (FLAXSEED OIL) 1000 MG capsule Take  by mouth.     • levothyroxine (SYNTHROID, LEVOTHROID) 75 MCG tablet Take  by mouth.     • losartan (COZAAR) 25 MG tablet TAKE 1 TABLET BY MOUTH DAILY 90 tablet 1   • NON FORMULARY Take 1 tablet by mouth Daily. Calcium, Vitamin D 1500 mg, Zinc     • polycarbophil (FIBERCON) 625 MG tablet Take 625 mg by mouth daily.     • Probiotic Product (PROBIOTIC DAILY PO)      • vitamin B-12 (CYANOCOBALAMIN) 1000 MCG tablet Take  by mouth.     • meloxicam (MOBIC) 15 MG tablet Take 1 tablet by mouth Daily. 30 tablet 2     Facility-Administered Medications Prior to Visit   Medication Dose Route Frequency Provider Last Rate Last Admin   • levalbuterol (XOPENEX) nebulizer solution 0.63 mg  0.63 mg Nebulization Q6H PRN Gladsy Gutierrez MD   0.63 mg at 01/13/20 1605       No opioid medication identified on active medication list. I have reviewed chart for other potential  high risk medication/s and harmful drug interactions in the elderly.          Aspirin is not on active medication list.  Aspirin use is not indicated based on review of current medical condition/s. Risk of harm outweighs potential benefits.  .    Patient Active Problem List   Diagnosis   • Hypothyroidism   • Arthralgia of hip   • Osteoporosis   • Hearing difficulty   • Rectal bleeding   • Diverticulitis of intestine without perforation or abscess without bleeding   • Senile osteoporosis   • Abnormal CT of the abdomen   • Buttock trauma   • Hematoma     Advance Care Planning  Advance Directive is on file.  ACP discussion was held with the patient during this visit. Patient has an advance directive in EMR which is still valid.           Objective    Vitals:    11/05/21 0728   BP: 130/78   Pulse: 66   Weight: 49.9 kg (110 lb)   Height: 157.5 cm (62\")   PainSc: 0-No pain     BMI Readings from Last 1 Encounters:   11/05/21 20.12 kg/m²   BMI is within " normal parameters. No follow-up required.    Does the patient have evidence of cognitive impairment? No    Physical Exam  Vitals and nursing note reviewed.   Constitutional:       Appearance: Normal appearance. She is well-developed.   HENT:      Head: Normocephalic and atraumatic.      Right Ear: Tympanic membrane and external ear normal.      Left Ear: Tympanic membrane and external ear normal.      Nose: Nose normal.      Mouth/Throat:      Mouth: Mucous membranes are moist.   Eyes:      Extraocular Movements: Extraocular movements intact.      Pupils: Pupils are equal, round, and reactive to light.   Cardiovascular:      Rate and Rhythm: Normal rate and regular rhythm.      Pulses: Normal pulses.      Heart sounds: Normal heart sounds.   Pulmonary:      Effort: Pulmonary effort is normal. No respiratory distress.      Breath sounds: Normal breath sounds.   Abdominal:      General: Abdomen is flat. Bowel sounds are normal.      Palpations: Abdomen is soft.   Genitourinary:     Comments: Vulvar atrophy / lichen changes      Musculoskeletal:         General: Normal range of motion.      Cervical back: Normal range of motion and neck supple.   Skin:     General: Skin is warm and dry.   Neurological:      General: No focal deficit present.      Mental Status: She is alert and oriented to person, place, and time.   Psychiatric:         Mood and Affect: Mood normal.         Behavior: Behavior normal.         Thought Content: Thought content normal.         Judgment: Judgment normal.       Lab Results   Component Value Date    CHLPL 209 (H) 11/03/2021    TRIG 77 11/03/2021     11/03/2021    LDL 78 11/03/2021    VLDL 13 11/03/2021            HEALTH RISK ASSESSMENT    Smoking Status:  Social History     Tobacco Use   Smoking Status Former Smoker   • Packs/day: 1.00   • Years: 15.00   • Pack years: 15.00   • Types: Cigarettes   Smokeless Tobacco Never Used     Alcohol Consumption:  Social History     Substance and  Sexual Activity   Alcohol Use Yes   • Alcohol/week: 2.0 standard drinks   • Types: 2 Cans of beer per week     Fall Risk Screen:    ROSANA Fall Risk Assessment was completed, and patient is at LOW risk for falls.Assessment completed on:11/5/2021    Depression Screening:  PHQ-2/PHQ-9 Depression Screening 11/5/2021   Little interest or pleasure in doing things 0   Feeling down, depressed, or hopeless 0   Trouble falling or staying asleep, or sleeping too much -   Feeling tired or having little energy -   Poor appetite or overeating -   Feeling bad about yourself - or that you are a failure or have let yourself or your family down -   Trouble concentrating on things, such as reading the newspaper or watching television -   Moving or speaking so slowly that other people could have noticed. Or the opposite - being so fidgety or restless that you have been moving around a lot more than usual -   Thoughts that you would be better off dead, or of hurting yourself in some way -   Total Score 0   If you checked off any problems, how difficult have these problems made it for you to do your work, take care of things at home, or get along with other people? -       Health Habits and Functional and Cognitive Screening:  Functional & Cognitive Status 11/5/2021   Do you have difficulty preparing food and eating? No   Do you have difficulty bathing yourself, getting dressed or grooming yourself? No   Do you have difficulty using the toilet? No   Do you have difficulty moving around from place to place? No   Do you have trouble with steps or getting out of a bed or a chair? No   Current Diet Well Balanced Diet   Dental Exam Up to date   Eye Exam Up to date   Exercise (times per week) -   Current Exercise Activities Include -   Do you need help using the phone?  -   Are you deaf or do you have serious difficulty hearing?  No   Do you need help with transportation? No   Do you need help shopping? No   Do you need help preparing meals?   No   Do you need help with housework?  No   Do you need help with laundry? No   Do you need help taking your medications? No   Do you need help managing money? No   Do you ever drive or ride in a car without wearing a seat belt? No   Have you felt unusual stress, anger or loneliness in the last month? No   Who do you live with? Spouse   If you need help, do you have trouble finding someone available to you? No   Have you been bothered in the last four weeks by sexual problems? No   Do you have difficulty concentrating, remembering or making decisions? No       Age-appropriate Screening Schedule:  Refer to the list below for future screening recommendations based on patient's age, sex and/or medical conditions. Orders for these recommended tests are listed in the plan section. The patient has been provided with a written plan.    Health Maintenance   Topic Date Due   • INFLUENZA VACCINE  08/01/2021   • DXA SCAN  12/04/2022   • MAMMOGRAM  12/15/2022   • PAP SMEAR  11/02/2023   • TDAP/TD VACCINES (3 - Td or Tdap) 11/02/2030   • ZOSTER VACCINE  Completed              Assessment/Plan   CMS Preventative Services Quick Reference  Risk Factors Identified During Encounter  Cardiovascular Disease  Glaucoma or Family History of Glaucoma  The above risks/problems have been discussed with the patient.  Follow up actions/plans if indicated are seen below in the Assessment/Plan Section.  Pertinent information has been shared with the patient in the After Visit Summary.    Diagnoses and all orders for this visit:    1. Healthcare maintenance (Primary)    2. Encounter for screening mammogram for breast cancer  -     Mammo screening digital tomosynthesis bilateral w CAD; Future    3. Hypothyroidism, unspecified type    4. Age-related osteoporosis without current pathological fracture    5. Hypertension, unspecified type    Other orders  -     Fluzone High-Dose 65+yrs ([id number])  -     desonide (DESOWEN) 0.05 % ointment; Apply 1  application topically to the appropriate area as directed Daily.  Dispense: 30 g; Refill: 2        Follow Up:   No follow-ups on file.     An After Visit Summary and PPPS were made available to the patient. She will continue current medications for thyroid regulation and bp control. Reviewed DEXa from last year. To continue q 6 months prolia. Will repeat calcium phos and mag prior to next injection. She is to start daily desonide for lichen/ vaginal atrophy. She will monitor bp.  To follow up in 1 year or prn.           I spent 50 minutes caring for Eve on this date of service. This time includes time spent by me in the following activities:preparing for the visit, reviewing tests, obtaining and/or reviewing a separately obtained history, performing a medically appropriate examination and/or evaluation , counseling and educating the patient/family/caregiver and documenting information in the medical record

## 2021-11-13 LAB — MAGNESIUM SERPL-MCNC: 2.2 MG/DL (ref 1.6–2.3)

## 2021-12-06 ENCOUNTER — TELEPHONE (OUTPATIENT)
Dept: INTERNAL MEDICINE | Facility: CLINIC | Age: 71
End: 2021-12-06

## 2021-12-06 NOTE — TELEPHONE ENCOUNTER
PATIENT CALLED STATING THAT SHE WOULD LIKE TO SWITCH ALL OF HER MEDICATION SWITCHED TO THE FOLLOWING PHARMACY:    [facility] - [place], [place] - [address] [facility] - [phone]  - [phone]   [phone]    CALL BACK IF NEEDED:  [phone]

## 2021-12-08 ENCOUNTER — INFUSION (OUTPATIENT)
Dept: ONCOLOGY | Facility: HOSPITAL | Age: 71
End: 2021-12-08

## 2021-12-08 VITALS — HEIGHT: 62 IN | TEMPERATURE: 96.8 F | RESPIRATION RATE: 18 BRPM | BODY MASS INDEX: 20.12 KG/M2

## 2021-12-08 DIAGNOSIS — M81.0 SENILE OSTEOPOROSIS: Primary | ICD-10-CM

## 2021-12-08 PROCEDURE — 25010000002 DENOSUMAB 60 MG/ML SOLUTION PREFILLED SYRINGE: Performed by: INTERNAL MEDICINE

## 2021-12-08 PROCEDURE — 96372 THER/PROPH/DIAG INJ SC/IM: CPT

## 2021-12-08 RX ADMIN — DENOSUMAB 60 MG: 60 INJECTION SUBCUTANEOUS at 13:39

## 2021-12-20 ENCOUNTER — OFFICE VISIT (OUTPATIENT)
Dept: INTERNAL MEDICINE | Facility: CLINIC | Age: 71
End: 2021-12-20

## 2021-12-20 VITALS
DIASTOLIC BLOOD PRESSURE: 82 MMHG | BODY MASS INDEX: 20.24 KG/M2 | HEIGHT: 62 IN | SYSTOLIC BLOOD PRESSURE: 126 MMHG | HEART RATE: 70 BPM | WEIGHT: 110 LBS | TEMPERATURE: 97.8 F

## 2021-12-20 DIAGNOSIS — K57.12 DIVERTICULITIS OF SMALL INTESTINE WITHOUT PERFORATION OR ABSCESS WITHOUT BLEEDING: Primary | ICD-10-CM

## 2021-12-20 PROCEDURE — 99213 OFFICE O/P EST LOW 20 MIN: CPT | Performed by: INTERNAL MEDICINE

## 2021-12-20 RX ORDER — AMOXICILLIN AND CLAVULANATE POTASSIUM 875; 125 MG/1; MG/1
1 TABLET, FILM COATED ORAL 2 TIMES DAILY
Qty: 14 TABLET | Refills: 0 | Status: SHIPPED | OUTPATIENT
Start: 2021-12-20 | End: 2022-06-10

## 2021-12-20 NOTE — PROGRESS NOTES
Chief Complaint   Patient presents with   • Abdominal Pain       History of Present Illness   Eve Hurley is a 71 y.o. female presents for acute care. She reports abdominal pain LLQ. She has a history of diverticulitis. Has known pan diverticulosis on last c scope. She has had diverticulitis about every 2 years. Last evaluated/ treated 2019 by dr. Dunbar. augmentin w/ good benefit at that time. She also had an episode in 2017 w/ scope neg except diverticulosis. No fever. No real fatigue.       The following portions of the patient's history were reviewed and updated as appropriate: allergies, current medications, past family history, past medical history, past social history, past surgical history and problem list.  Current Outpatient Medications on File Prior to Visit   Medication Sig Dispense Refill   • acetaminophen (TYLENOL) 500 MG tablet Take 1,000 mg by mouth every day.     • calcium carbonate EX (TUMS EX) 750 MG chewable tablet Chew 750 mg 4 (four) times a day.     • cholecalciferol (VITAMIN D3) 10 MCG (400 UNIT) tablet Take  by mouth 3 (Three) Times a Week.     • denosumab (PROLIA) 60 MG/ML solution syringe Inject 60 mg under the skin every 6 (six) months.     • desonide (DESOWEN) 0.05 % ointment Apply 1 application topically to the appropriate area as directed Daily. 30 g 2   • Flaxseed, Linseed, (FLAXSEED OIL) 1000 MG capsule Take  by mouth.     • levothyroxine (SYNTHROID, LEVOTHROID) 75 MCG tablet Take  by mouth.     • losartan (COZAAR) 25 MG tablet TAKE 1 TABLET BY MOUTH DAILY 90 tablet 1   • NON FORMULARY Take 1 tablet by mouth Daily. Calcium, Vitamin D 1500 mg, Zinc     • polycarbophil (FIBERCON) 625 MG tablet Take 625 mg by mouth daily.     • Probiotic Product (PROBIOTIC DAILY PO)      • vitamin B-12 (CYANOCOBALAMIN) 1000 MCG tablet Take  by mouth.       Current Facility-Administered Medications on File Prior to Visit   Medication Dose Route Frequency Provider Last Rate Last Admin   • levalbuterol  "(XOPENEX) nebulizer solution 0.63 mg  0.63 mg Nebulization Q6H PRN Gladys Gutierrez MD   0.63 mg at 01/13/20 1605     Review of Systems   Constitutional: Negative.    HENT: Negative.    Eyes: Negative.    Respiratory: Negative.    Cardiovascular: Negative.    Gastrointestinal: Positive for abdominal pain.   Endocrine: Negative.    Genitourinary: Negative.    Musculoskeletal: Negative.    Skin: Negative.    Allergic/Immunologic: Negative.    Neurological: Negative.    Hematological: Negative.    Psychiatric/Behavioral: Negative.        Objective   Physical Exam  Vitals and nursing note reviewed.   Constitutional:       Appearance: Normal appearance.   HENT:      Head: Normocephalic and atraumatic.      Right Ear: Tympanic membrane normal.      Left Ear: Tympanic membrane normal.      Nose: Nose normal.      Mouth/Throat:      Mouth: Mucous membranes are moist.   Eyes:      Extraocular Movements: Extraocular movements intact.      Pupils: Pupils are equal, round, and reactive to light.   Cardiovascular:      Rate and Rhythm: Normal rate and regular rhythm.      Pulses: Normal pulses.      Heart sounds: Normal heart sounds.   Pulmonary:      Effort: Pulmonary effort is normal.      Breath sounds: Normal breath sounds.   Abdominal:      General: Abdomen is flat.      Palpations: Abdomen is soft.      Tenderness: There is abdominal tenderness.   Musculoskeletal:         General: Normal range of motion.      Cervical back: Normal range of motion and neck supple.   Skin:     General: Skin is warm and dry.   Neurological:      General: No focal deficit present.      Mental Status: She is alert and oriented to person, place, and time.   Psychiatric:         Mood and Affect: Mood normal.         Behavior: Behavior normal.         Thought Content: Thought content normal.          /82   Pulse 70   Temp 97.8 °F (36.6 °C)   Ht 157.5 cm (62\")   Wt 49.9 kg (110 lb)   LMP  (LMP Unknown)   BMI 20.12 kg/m² "     Assessment/Plan   Diagnoses and all orders for this visit:    Diverticulitis of small intestine without perforation or abscess without bleeding    Other orders  -     amoxicillin-clavulanate (Augmentin) 875-125 MG per tablet; Take 1 tablet by mouth 2 (Two) Times a Day.      Presents with acute left lower quadrant pain. Suspect diverticulitis. Will start augmentin 875 po bid. She tolerated this well in the past.  If any further issues will get scan and f/u w/ gi. She will f/u here routinely.

## 2021-12-22 ENCOUNTER — TELEPHONE (OUTPATIENT)
Dept: INTERNAL MEDICINE | Facility: CLINIC | Age: 71
End: 2021-12-22

## 2021-12-23 ENCOUNTER — APPOINTMENT (OUTPATIENT)
Dept: CT IMAGING | Facility: HOSPITAL | Age: 71
End: 2021-12-23

## 2021-12-23 ENCOUNTER — HOSPITAL ENCOUNTER (EMERGENCY)
Facility: HOSPITAL | Age: 71
Discharge: HOME | End: 2021-12-23
Attending: EMERGENCY MEDICINE | Admitting: EMERGENCY MEDICINE

## 2021-12-23 VITALS
SYSTOLIC BLOOD PRESSURE: 129 MMHG | OXYGEN SATURATION: 97 % | HEIGHT: 62 IN | HEART RATE: 75 BPM | TEMPERATURE: 97.9 F | WEIGHT: 105 LBS | BODY MASS INDEX: 19.32 KG/M2 | DIASTOLIC BLOOD PRESSURE: 73 MMHG | RESPIRATION RATE: 18 BRPM

## 2021-12-23 DIAGNOSIS — R10.32 LLQ PAIN: ICD-10-CM

## 2021-12-23 DIAGNOSIS — K29.00 ACUTE GASTRITIS WITHOUT HEMORRHAGE, UNSPECIFIED GASTRITIS TYPE: Primary | ICD-10-CM

## 2021-12-23 DIAGNOSIS — Z87.19 HX OF DIVERTICULITIS OF COLON: ICD-10-CM

## 2021-12-23 LAB
ALBUMIN SERPL-MCNC: 4.1 G/DL (ref 3.5–5.2)
ALBUMIN/GLOB SERPL: 1.3 G/DL
ALP SERPL-CCNC: 33 U/L (ref 39–117)
ALT SERPL W P-5'-P-CCNC: 10 U/L (ref 1–33)
ANION GAP SERPL CALCULATED.3IONS-SCNC: 12.4 MMOL/L (ref 5–15)
AST SERPL-CCNC: 17 U/L (ref 1–32)
BACTERIA UR QL AUTO: ABNORMAL /HPF
BASOPHILS # BLD AUTO: 0.02 10*3/MM3 (ref 0–0.2)
BASOPHILS NFR BLD AUTO: 0.2 % (ref 0–1.5)
BILIRUB SERPL-MCNC: 0.2 MG/DL (ref 0–1.2)
BILIRUB UR QL STRIP: NEGATIVE
BUN SERPL-MCNC: 15 MG/DL (ref 8–23)
BUN/CREAT SERPL: 16 (ref 7–25)
CALCIUM SPEC-SCNC: 9.1 MG/DL (ref 8.6–10.5)
CHLORIDE SERPL-SCNC: 102 MMOL/L (ref 98–107)
CLARITY UR: CLEAR
CO2 SERPL-SCNC: 25.6 MMOL/L (ref 22–29)
COLOR UR: YELLOW
CREAT SERPL-MCNC: 0.94 MG/DL (ref 0.57–1)
DEPRECATED RDW RBC AUTO: 40.6 FL (ref 37–54)
EOSINOPHIL # BLD AUTO: 0.41 10*3/MM3 (ref 0–0.4)
EOSINOPHIL NFR BLD AUTO: 4.4 % (ref 0.3–6.2)
ERYTHROCYTE [DISTWIDTH] IN BLOOD BY AUTOMATED COUNT: 11.9 % (ref 12.3–15.4)
GFR SERPL CREATININE-BSD FRML MDRD: 59 ML/MIN/1.73
GLOBULIN UR ELPH-MCNC: 3.1 GM/DL
GLUCOSE SERPL-MCNC: 81 MG/DL (ref 65–99)
GLUCOSE UR STRIP-MCNC: NEGATIVE MG/DL
HCT VFR BLD AUTO: 41 % (ref 34–46.6)
HGB BLD-MCNC: 13.9 G/DL (ref 12–15.9)
HGB UR QL STRIP.AUTO: ABNORMAL
HOLD SPECIMEN: NORMAL
HOLD SPECIMEN: NORMAL
HYALINE CASTS UR QL AUTO: ABNORMAL /LPF
IMM GRANULOCYTES # BLD AUTO: 0.04 10*3/MM3 (ref 0–0.05)
IMM GRANULOCYTES NFR BLD AUTO: 0.4 % (ref 0–0.5)
KETONES UR QL STRIP: ABNORMAL
LEUKOCYTE ESTERASE UR QL STRIP.AUTO: NEGATIVE
LIPASE SERPL-CCNC: 33 U/L (ref 13–60)
LYMPHOCYTES # BLD AUTO: 1.94 10*3/MM3 (ref 0.7–3.1)
LYMPHOCYTES NFR BLD AUTO: 20.8 % (ref 19.6–45.3)
MCH RBC QN AUTO: 31.6 PG (ref 26.6–33)
MCHC RBC AUTO-ENTMCNC: 33.9 G/DL (ref 31.5–35.7)
MCV RBC AUTO: 93.2 FL (ref 79–97)
MONOCYTES # BLD AUTO: 0.9 10*3/MM3 (ref 0.1–0.9)
MONOCYTES NFR BLD AUTO: 9.7 % (ref 5–12)
NEUTROPHILS NFR BLD AUTO: 6.01 10*3/MM3 (ref 1.7–7)
NEUTROPHILS NFR BLD AUTO: 64.5 % (ref 42.7–76)
NITRITE UR QL STRIP: NEGATIVE
NRBC BLD AUTO-RTO: 0 /100 WBC (ref 0–0.2)
PH UR STRIP.AUTO: 6 [PH] (ref 5–8)
PLATELET # BLD AUTO: 362 10*3/MM3 (ref 140–450)
PMV BLD AUTO: 9.8 FL (ref 6–12)
POTASSIUM SERPL-SCNC: 3.7 MMOL/L (ref 3.5–5.2)
PROT SERPL-MCNC: 7.2 G/DL (ref 6–8.5)
PROT UR QL STRIP: NEGATIVE
RBC # BLD AUTO: 4.4 10*6/MM3 (ref 3.77–5.28)
RBC # UR STRIP: ABNORMAL /HPF
REF LAB TEST METHOD: ABNORMAL
SODIUM SERPL-SCNC: 140 MMOL/L (ref 136–145)
SP GR UR STRIP: 1.03 (ref 1–1.03)
SQUAMOUS #/AREA URNS HPF: ABNORMAL /HPF
UROBILINOGEN UR QL STRIP: ABNORMAL
WBC # UR STRIP: ABNORMAL /HPF
WBC NRBC COR # BLD: 9.32 10*3/MM3 (ref 3.4–10.8)
WHOLE BLOOD HOLD SPECIMEN: NORMAL
WHOLE BLOOD HOLD SPECIMEN: NORMAL

## 2021-12-23 PROCEDURE — 80053 COMPREHEN METABOLIC PANEL: CPT | Performed by: NURSE PRACTITIONER

## 2021-12-23 PROCEDURE — 25010000002 IOPAMIDOL 61 % SOLUTION: Performed by: NURSE PRACTITIONER

## 2021-12-23 PROCEDURE — 99283 EMERGENCY DEPT VISIT LOW MDM: CPT

## 2021-12-23 PROCEDURE — 83690 ASSAY OF LIPASE: CPT | Performed by: NURSE PRACTITIONER

## 2021-12-23 PROCEDURE — 85025 COMPLETE CBC W/AUTO DIFF WBC: CPT | Performed by: NURSE PRACTITIONER

## 2021-12-23 PROCEDURE — 74177 CT ABD & PELVIS W/CONTRAST: CPT

## 2021-12-23 PROCEDURE — 81001 URINALYSIS AUTO W/SCOPE: CPT | Performed by: NURSE PRACTITIONER

## 2021-12-23 RX ORDER — SODIUM CHLORIDE 0.9 % (FLUSH) 0.9 %
10 SYRINGE (ML) INJECTION AS NEEDED
Status: DISCONTINUED | OUTPATIENT
Start: 2021-12-23 | End: 2021-12-23 | Stop reason: HOSPADM

## 2021-12-23 RX ORDER — OMEPRAZOLE 20 MG/1
20 CAPSULE, DELAYED RELEASE ORAL DAILY
Qty: 14 CAPSULE | Refills: 0 | Status: SHIPPED | OUTPATIENT
Start: 2021-12-23 | End: 2022-01-04 | Stop reason: SDUPTHER

## 2021-12-23 RX ORDER — ONDANSETRON 4 MG/1
4 TABLET, ORALLY DISINTEGRATING ORAL EVERY 8 HOURS PRN
Qty: 10 TABLET | Refills: 0 | Status: SHIPPED | OUTPATIENT
Start: 2021-12-23 | End: 2022-06-10 | Stop reason: ALTCHOICE

## 2021-12-23 RX ADMIN — IOPAMIDOL 85 ML: 612 INJECTION, SOLUTION INTRAVENOUS at 08:03

## 2021-12-23 NOTE — ED PROVIDER NOTES
EMERGENCY DEPARTMENT ENCOUNTER    Room Number:  02/02  Date of encounter:  12/23/2021  PCP: Gladys Gutierrez MD  Historian: patient   Full history not obtainable due to: none     HPI:  Chief Complaint: Abdominal pain     Context: Eve Hurley is a 71 y.o. female who presents to the ED c/o abdominal pain onset 4 days ago. Pain is lower and left sided. Non radiated. Constant but waxes and wanes. Associated nausea. One episode of vomiting. Hx of diverticulitis and states the symptoms are similar. She saw her PCP Monday and was Rx Augmentin and has been taking without relief. She did not have any testing at that time.     Has had multiple episodes of diverticulitis in the past. Has been evaluated by general surgery previously but states they did not feel she required surgical intervention at that time. She has typoically been treated outpatient with oral abx for her episodes. Last colonoscopy was 5 years ago.       PAST MEDICAL HISTORY    Active Ambulatory Problems     Diagnosis Date Noted   • Hypothyroidism 03/06/2016   • Arthralgia of hip 03/06/2016   • Osteoporosis 03/06/2016   • Hearing difficulty 03/06/2016   • Rectal bleeding 03/15/2016   • Diverticulitis of intestine without perforation or abscess without bleeding 01/13/2017   • Senile osteoporosis 04/26/2017   • Abnormal CT of the abdomen 08/23/2017   • Buttock trauma 09/18/2017   • Hematoma 09/18/2017     Resolved Ambulatory Problems     Diagnosis Date Noted   • No Resolved Ambulatory Problems     Past Medical History:   Diagnosis Date   • Actinic keratoses    • Anemia    • Atypical chest pain 12/26/2009   • Cataract    • DDD (degenerative disc disease), cervical    • Disease of thyroid gland    • Disorder of cecum 07/18/2017   • Diverticulitis    • Diverticulosis    • Fall    • Fibrocystic breast    • Hip joint pain    • HL (hearing loss)    • Hypertension    • Irritable bowel syndrome    • Left lower quadrant pain 07/14/2004   • Lower extremity pain, left  06/15/2012   • Osteopenia    • Perforated ear drum 2014   • Pneumonia    • PONV (postoperative nausea and vomiting)    • Rectal bleed 03/15/2016   • Renal insufficiency 2016   • Tremor    • Urinary tract infection    • Vegetarian    • Visual impairment          PAST SURGICAL HISTORY  Past Surgical History:   Procedure Laterality Date   • BREAST SURGERY Right     CYST ASPIRATION   •  SECTION N/A    • COLONOSCOPY N/A 2004    DIVERTICULOSIS, REDUNDANT SIGMOID, DR. KELLEN ROBERTS AT [place]   • COLONOSCOPY N/A 2017    Procedure: COLONOSCOPY INTO CECUM;  Surgeon: Marito Dunbar MD;  Location: [facility] ENDOSCOPY;  Service:    • HEMORRHOIDECTOMY N/A 2009    DR. KELLEN ROBERTS AT [place]   • TUBAL ABDOMINAL LIGATION Bilateral          FAMILY HISTORY  Family History   Problem Relation Age of Onset   • Coronary artery disease Father    • Heart disease Father    • Hypertension Father    • Arthritis Father    • Alcohol abuse Father    • Hyperthyroidism Brother    • Thyroid disease Brother    • Diabetes Daughter    • Coronary artery disease Maternal Grandmother    • Arthritis Maternal Grandmother    • Asthma Maternal Grandmother    • ALS Mother    • Depression Mother    • Early death Mother         ALS   • Thyroid disease Sister    • Heart disease Maternal Grandfather    • Alcohol abuse Maternal Grandfather    • Early death Maternal Grandfather         Heart Attack   • Arthritis Paternal Grandmother    • Diabetes Paternal Grandmother         Type 2   • Alcohol abuse Paternal Grandfather    • Macular degeneration Paternal Grandfather    • Heart attack Paternal Grandfather    • Alcohol abuse Sister    • Depression Sister    • Alcohol abuse Sister    • Depression Sister    • Diabetes Daughter         T1         SOCIAL HISTORY  Social History     Socioeconomic History   • Marital status:    Tobacco Use   • Smoking status: Former Smoker     Packs/day: 1.00     Years: 15.00     Pack  years: 15.00     Types: Cigarettes   • Smokeless tobacco: Never Used   Substance and Sexual Activity   • Alcohol use: Yes     Alcohol/week: 2.0 standard drinks     Types: 2 Cans of beer per week   • Drug use: No   • Sexual activity: Not Currently     Partners: Male         ALLERGIES  Sulfa antibiotics and Penicillins        REVIEW OF SYSTEMS  Review of Systems   All systems reviewed and marked as negative except as listed in HPI       PHYSICAL EXAM    I have reviewed the triage vital signs and nursing notes.    ED Triage Vitals [12/23/21 0612]   Temp Heart Rate Resp BP SpO2   97.9 °F (36.6 °C) 101 18 105/79 98 %      Temp src Heart Rate Source Patient Position BP Location FiO2 (%)   Oral -- -- -- --       GENERAL: alert well developed, well nourished in no distress  HENT: NCAT, neck supple, trachea midline  EYES: no scleral icterus, PERRL, normal conjunctivae  CV: regular rhythm, regular rate, no murmur  RESPIRATORY: unlabored effort, CTAB  ABDOMEN: soft, mild LLQ tenderness without rebound, nondistended, bowel sounds present  MUSCULOSKELETAL: no gross deformity  NEURO: alert,  sensory and motor function of extremities grossly intact, speech clear, mental status normal/baseline  SKIN: warm, dry, no rash  PSYCH:  Appropriate mood and affect    Vital signs and nursing notes reviewed.          LAB RESULTS  Recent Results (from the past 24 hour(s))   Comprehensive Metabolic Panel    Collection Time: 12/23/21  6:56 AM    Specimen: Blood   Result Value Ref Range    Glucose 81 65 - 99 mg/dL    BUN 15 8 - 23 mg/dL    Creatinine 0.94 0.57 - 1.00 mg/dL    Sodium 140 136 - 145 mmol/L    Potassium 3.7 3.5 - 5.2 mmol/L    Chloride 102 98 - 107 mmol/L    CO2 25.6 22.0 - 29.0 mmol/L    Calcium 9.1 8.6 - 10.5 mg/dL    Total Protein 7.2 6.0 - 8.5 g/dL    Albumin 4.10 3.50 - 5.20 g/dL    ALT (SGPT) 10 1 - 33 U/L    AST (SGOT) 17 1 - 32 U/L    Alkaline Phosphatase 33 (L) 39 - 117 U/L    Total Bilirubin 0.2 0.0 - 1.2 mg/dL    eGFR Non   Amer 59 (L) >60 mL/min/1.73    Globulin 3.1 gm/dL    A/G Ratio 1.3 g/dL    BUN/Creatinine Ratio 16.0 7.0 - 25.0    Anion Gap 12.4 5.0 - 15.0 mmol/L   Lipase    Collection Time: 12/23/21  6:56 AM    Specimen: Blood   Result Value Ref Range    Lipase 33 13 - 60 U/L   CBC Auto Differential    Collection Time: 12/23/21  6:56 AM    Specimen: Blood   Result Value Ref Range    WBC 9.32 3.40 - 10.80 10*3/mm3    RBC 4.40 3.77 - 5.28 10*6/mm3    Hemoglobin 13.9 12.0 - 15.9 g/dL    Hematocrit 41.0 34.0 - 46.6 %    MCV 93.2 79.0 - 97.0 fL    MCH 31.6 26.6 - 33.0 pg    MCHC 33.9 31.5 - 35.7 g/dL    RDW 11.9 (L) 12.3 - 15.4 %    RDW-SD 40.6 37.0 - 54.0 fl    MPV 9.8 6.0 - 12.0 fL    Platelets 362 140 - 450 10*3/mm3    Neutrophil % 64.5 42.7 - 76.0 %    Lymphocyte % 20.8 19.6 - 45.3 %    Monocyte % 9.7 5.0 - 12.0 %    Eosinophil % 4.4 0.3 - 6.2 %    Basophil % 0.2 0.0 - 1.5 %    Immature Grans % 0.4 0.0 - 0.5 %    Neutrophils, Absolute 6.01 1.70 - 7.00 10*3/mm3    Lymphocytes, Absolute 1.94 0.70 - 3.10 10*3/mm3    Monocytes, Absolute 0.90 0.10 - 0.90 10*3/mm3    Eosinophils, Absolute 0.41 (H) 0.00 - 0.40 10*3/mm3    Basophils, Absolute 0.02 0.00 - 0.20 10*3/mm3    Immature Grans, Absolute 0.04 0.00 - 0.05 10*3/mm3    nRBC 0.0 0.0 - 0.2 /100 WBC   Green Top (Gel)    Collection Time: 12/23/21  6:56 AM   Result Value Ref Range    Extra Tube Hold for add-ons.    Lavender Top    Collection Time: 12/23/21  6:56 AM   Result Value Ref Range    Extra Tube hold for add-on    Gold Top - SST    Collection Time: 12/23/21  6:56 AM   Result Value Ref Range    Extra Tube Hold for add-ons.    Light Blue Top    Collection Time: 12/23/21  6:56 AM   Result Value Ref Range    Extra Tube hold for add-on    Urinalysis With Microscopic If Indicated (No Culture) - Urine, Clean Catch    Collection Time: 12/23/21  7:00 AM    Specimen: Urine, Clean Catch   Result Value Ref Range    Color, UA Yellow Yellow, Straw    Appearance, UA Clear Clear    pH,  UA 6.0 5.0 - 8.0    Specific Gravity, UA 1.026 1.005 - 1.030    Glucose, UA Negative Negative    Ketones, UA 40 mg/dL (2+) (A) Negative    Bilirubin, UA Negative Negative    Blood, UA Moderate (2+) (A) Negative    Protein, UA Negative Negative    Leuk Esterase, UA Negative Negative    Nitrite, UA Negative Negative    Urobilinogen, UA 0.2 E.U./dL 0.2 - 1.0 E.U./dL   Urinalysis, Microscopic Only - Urine, Clean Catch    Collection Time: 12/23/21  7:00 AM    Specimen: Urine, Clean Catch   Result Value Ref Range    RBC, UA 13-20 (A) None Seen, 0-2 /HPF    WBC, UA 0-2 None Seen, 0-2 /HPF    Bacteria, UA None Seen None Seen /HPF    Squamous Epithelial Cells, UA 0-2 None Seen, 0-2 /HPF    Hyaline Casts, UA 3-6 None Seen /LPF    Methodology Automated Microscopy        Ordered the above labs and independently reviewed the results.        RADIOLOGY  CT Abdomen Pelvis With Contrast    Result Date: 12/23/2021  CT ABDOMEN AND PELVIS WITH CONTRAST  HISTORY: Left lower quadrant abdominal pain.  TECHNIQUE: Axial CT images of the abdomen and pelvis were obtained following administration of intravenous contrast. The patient was not given oral contrast Coronal and sagittal reformats were obtained.  COMPARISON: 07/18/2017  FINDINGS: The small and large bowel loops demonstrate normal caliber. Colonic diverticulosis is present. No CT evidence of acute diverticulitis. The appendix is normal. Circumferential wall thickening of the stomach most suggestive of gastritis.  The liver demonstrates normal attenuation. Small focal hypoattenuating foci are most suggestive of cysts. Area of focal fatty infiltration adjacent to the falciform. The spleen is normal with punctate calcified granulomas. The pancreas is normal without ductal dilatation. Gallbladder is unremarkable. Bilateral adrenal glands are normal. Both kidneys are normal in size and attenuation. No renal calculi or hydronephrosis. Urinary bladder is partially distended and normal.  Uterus is unremarkable. 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 A is seen at S2 level.      1. CT findings of gastritis. 2. Colonic diverticulosis.  These findings were discussed with Hermelinda Godinez by telephone.  Radiation dose reduction techniques were utilized, including automated exposure control and exposure modulation based on body size.         I ordered the above noted radiological studies. Independently reviewed by me and discussed with radiologist.  See dictation above for official radiology interpretation.      PROCEDURES    Procedures        MEDICATIONS GIVEN IN ER    Medications   sodium chloride 0.9 % flush 10 mL (has no administration in time range)   iopamidol (ISOVUE-300) 61 % injection 100 mL (85 mL Intravenous Given 12/23/21 0803)         PROGRESS, DATA ANALYSIS, CONSULTS, AND MEDICAL DECISION MAKING    All labs have been independently reviewed by me.  All radiology studies have been reviewed by me.   EKG's independently reviewed by me.  Discussion below represents my analysis of pertinent findings related to patient's condition, differential diagnosis, treatment plan and final disposition.    DIFFERENTIAL DIAGNOSIS INCLUDE BUT NOT LIMITED TO: Gastroenteritis, flatus, appendicitis, pancreatitis, renal colic, nephrolithiasis, renal infarct, splenic infarct, mesenteric ischemia, perforated bowel, SBO, diverticulitis, colitis, abdominal wall pain, muscle spasm, IBS, biliary colic, cholecystitis      ED Course as of 12/23/21 0921   Thu Dec 23, 2021   0734 WBC: 9.32 [JS]   0734 Hemoglobin: 13.9 [JS]   0734 Ketones, UA(!): 40 mg/dL (2+) [JS]   0838 Discussed pt with Dr Ely, radiologist regarding ct abdomen and pelvis,  who reports the pt has gastritis. Diverticulosis is seen but no diverticulitis is appreciated.  [JS]   0839 Glucose: 81 [JS]   0839 BUN: 15 [JS]   0916 Ketones, UA(!): 40 mg/dL (2+) [JS]   0916 Creatinine: 0.94  [JS]   0916 WBC: 9.32 [JS]   0916 Lipase: 33  Updated pt on ct imaging results and labs results. Her exam is stable. She appears well. Pain is in LLQ typical of her diverticulitis and she has been on abx for 4 days. She could perhaps have had a mild diverticulitis which is improving on oral abx and radiographically absent on imaging vs only the identified gastritis on ct.  She will be rx zofran for nausea. Encouraged to push fluids. Recommend PPI and f/u with her pcp. Plan for d/c from the ED with close outpatient follow up.  [JS]      ED Course User Index  [JS] Herbert Hermelindadoug Martínez, TEODORO       AS OF 09:21 EST VITALS:        BP - 129/73  HR - 75  TEMP - 97.9 °F (36.6 °C) (Oral)  O2 SATS - 97%         Medication List      New Prescriptions    omeprazole 20 MG capsule  Commonly known as: priLOSEC  Take 1 capsule by mouth Daily.     ondansetron ODT 4 MG disintegrating tablet  Commonly known as: ZOFRAN-ODT  Place 1 tablet on the tongue Every 8 (Eight) Hours As Needed for Vomiting.           Where to Get Your Medications      These medications were sent to [facility] - [place], [place] - [address] [facility] - [phone]  - [phone] [address] [address]    Phone: [phone]   · omeprazole 20 MG capsule  · ondansetron ODT 4 MG disintegrating tablet           DIAGNOSIS  Final diagnoses:   Acute gastritis without hemorrhage, unspecified gastritis type   LLQ pain   Hx of diverticulitis of colon         DISPOSITION  Discharge     Pt masked in first look. I wore appropriate PPE throughout my encounters with the pt. I performed hand hygiene on entry into the pt room and upon exit.     Dictated utilizing Dragon dictation:  Much of this encounter note is an electronic transcription/translation of spoken language to printed text. The electronic translation of spoken language may permit erroneous, or at times, nonsensical words or phrases to be inadvertently transcribed; Although I have  reviewed the note for such errors, some may still exist.     Hermelinda Godinez, APRN  12/23/21 0977

## 2021-12-23 NOTE — ED TRIAGE NOTES
Pt arrived to ED via PV with c/o diverticulitis, hx of same.  Started Augmentin on Monday and symptoms have gotten worse.  States having N/V, denies diarrhea.      Patient was placed in face mask during first look triage.  Patient was wearing a face mask throughout encounter.  I wore personal protective equipment throughout the encounter.  Hand hygiene was performed before and after patient encounter.

## 2021-12-23 NOTE — DISCHARGE INSTRUCTIONS
Home to rest  Zofran as needed  Drink plenty of fluids  Take omeprazole as directed  Return if worse or new concerns   Follow up closely with your pcp next week  Continue care with your primary care physician and have your blood pressure regularly checked and managed. Normal blood pressure is 120/80.

## 2021-12-23 NOTE — ED PROVIDER NOTES
Pt presents to the ED c/o  lower quadrant abdominal pain since Sunday.  Patient states she has a history of diverticulosis and diverticulitis.  Her pain felt similar to diverticulitis.  She did have an episode of nausea and vomiting on Sunday and her appetite has been off since.  She called her doctor who placed her on Augmentin.  She states that her symptoms have not been improving despite the Augmentin.  She denies bloody emesis, black stools or diarrhea.  She denies dizziness or lightheadedness.     On exam,   Her heart is regular rate and rhythm without murmur.  Lungs are clear to auscultation bilaterally.  Her abdomen is NABS, soft, nontender nondistended.     Plan: I agree with plan of checking blood work and a CT of the abdomen pelvis to rule out diverticular abscess.  Her CT is already been performed and reveals gastritis without diverticulitis.  I have updated patient and family the results of the CT scan.  Patient is currently not on a PPI.  We will place patient on a PPI.      Patient was placed in face mask in first look. Patient was wearing facemask when I entered the room and throughout our encounter. I wore full protective equipment throughout this patient encounter including a face mask, eye shield and gloves. Hand hygiene was performed before donning protective equipment and after removal when leaving the room.       Attestation:  The TRISH and I have discussed this patient's history, physical exam, and treatment plan.  I have reviewed the documentation and personally had a face to face interaction with the patient. I affirm the documentation and agree with the treatment and plan.  The attached note describes my personal findings.            Peter Grossman MD  12/23/21 5095

## 2021-12-23 NOTE — ED NOTES
"Pt states \"I started having symptoms of a diverticulitis flare up on Sunday night, really started noticing it on Monday. Im having pain in my left lower stomach, that has progressively  gotten worse. I have no appetite, and I am having nausea and I have vomited once. I have also lost 5 lbs since this has began.\"    Pt in NAD at this time, VSS, last BM yesterday. Denies Chest pain, or SOB.    Patient was placed in face mask during first look triage.  Patient was wearing a face mask throughout encounter.  I wore personal protective equipment throughout the encounter.  Hand hygiene was performed before and after patient encounter.       Carlie Espinosa, RN  12/23/21 0637    "

## 2022-01-04 ENCOUNTER — OFFICE VISIT (OUTPATIENT)
Dept: INTERNAL MEDICINE | Facility: CLINIC | Age: 72
End: 2022-01-04

## 2022-01-04 VITALS
DIASTOLIC BLOOD PRESSURE: 78 MMHG | HEIGHT: 62 IN | BODY MASS INDEX: 19.88 KG/M2 | HEART RATE: 80 BPM | WEIGHT: 108 LBS | SYSTOLIC BLOOD PRESSURE: 146 MMHG

## 2022-01-04 DIAGNOSIS — R10.32 LEFT LOWER QUADRANT ABDOMINAL PAIN: ICD-10-CM

## 2022-01-04 DIAGNOSIS — I77.1 STENOSIS OF CELIAC ARTERY: Primary | ICD-10-CM

## 2022-01-04 DIAGNOSIS — K29.00 ACUTE GASTRITIS, PRESENCE OF BLEEDING UNSPECIFIED, UNSPECIFIED GASTRITIS TYPE: ICD-10-CM

## 2022-01-04 PROCEDURE — 99214 OFFICE O/P EST MOD 30 MIN: CPT | Performed by: INTERNAL MEDICINE

## 2022-01-04 RX ORDER — OMEPRAZOLE 20 MG/1
20 CAPSULE, DELAYED RELEASE ORAL DAILY
Qty: 30 CAPSULE | Refills: 1 | Status: SHIPPED | OUTPATIENT
Start: 2022-01-04 | End: 2022-02-04

## 2022-01-04 NOTE — PROGRESS NOTES
Chief Complaint   Patient presents with   • Other     gastritis   • Abdominal Pain   • celiac artery stenosis       History of Present Illness   Eve Hurley is a 71 y.o. female presents for ER follow up. Treated for presumed diverticulitis related to LLQ pain. She then developed nausea w/ continued pain. CT c/w gastritis. Was Rx prilosec. She completed augmentin. CT did reveal severe narrowing of celiac artery. Patient reports 4 beers/ week. 2 caffeinated drinks/ d (but has reduced this). No nsaid (tylenol for pain). She no longer has LLQ pain or epigastric pain or nausea.       The following portions of the patient's history were reviewed and updated as appropriate: allergies, current medications, past family history, past medical history, past social history, past surgical history and problem list.  Current Outpatient Medications on File Prior to Visit   Medication Sig Dispense Refill   • acetaminophen (TYLENOL) 500 MG tablet Take 1,000 mg by mouth every day.     • calcium carbonate EX (TUMS EX) 750 MG chewable tablet Chew 750 mg 4 (four) times a day.     • cholecalciferol (VITAMIN D3) 10 MCG (400 UNIT) tablet Take  by mouth 3 (Three) Times a Week.     • denosumab (PROLIA) 60 MG/ML solution syringe Inject 60 mg under the skin every 6 (six) months.     • desonide (DESOWEN) 0.05 % ointment Apply 1 application topically to the appropriate area as directed Daily. 30 g 2   • Flaxseed, Linseed, (FLAXSEED OIL) 1000 MG capsule Take  by mouth.     • levothyroxine (SYNTHROID, LEVOTHROID) 75 MCG tablet Take  by mouth.     • losartan (COZAAR) 25 MG tablet TAKE 1 TABLET BY MOUTH DAILY 90 tablet 1   • NON FORMULARY Take 1 tablet by mouth Daily. Calcium, Vitamin D 1500 mg, Zinc     • omeprazole (priLOSEC) 20 MG capsule Take 1 capsule by mouth Daily. 14 capsule 0   • ondansetron ODT (ZOFRAN-ODT) 4 MG disintegrating tablet Place 1 tablet on the tongue Every 8 (Eight) Hours As Needed for Vomiting. 10 tablet 0   • polycarbophil  (FIBERCON) 625 MG tablet Take 625 mg by mouth daily.     • Probiotic Product (PROBIOTIC DAILY PO)      • vitamin B-12 (CYANOCOBALAMIN) 1000 MCG tablet Take  by mouth.     • amoxicillin-clavulanate (Augmentin) 875-125 MG per tablet Take 1 tablet by mouth 2 (Two) Times a Day. 14 tablet 0     Current Facility-Administered Medications on File Prior to Visit   Medication Dose Route Frequency Provider Last Rate Last Admin   • levalbuterol (XOPENEX) nebulizer solution 0.63 mg  0.63 mg Nebulization Q6H PRN Gladys Gutierrez MD   0.63 mg at 01/13/20 1605     Review of Systems   Constitutional: Negative.    HENT: Negative.    Eyes: Negative.    Respiratory: Negative.    Cardiovascular: Negative.    Gastrointestinal: Negative for abdominal pain, constipation, diarrhea, nausea and vomiting.   Endocrine: Negative.    Genitourinary: Negative.    Musculoskeletal: Negative.    Skin: Negative.    Allergic/Immunologic: Negative.    Neurological: Negative.    Hematological: Negative.    Psychiatric/Behavioral: Negative.        Objective   Physical Exam  Vitals and nursing note reviewed.   Constitutional:       Appearance: Normal appearance. She is well-developed.   HENT:      Head: Normocephalic and atraumatic.      Right Ear: Tympanic membrane and external ear normal.      Left Ear: Tympanic membrane and external ear normal.      Nose: Nose normal.      Mouth/Throat:      Mouth: Mucous membranes are moist.   Eyes:      Extraocular Movements: Extraocular movements intact.      Pupils: Pupils are equal, round, and reactive to light.   Cardiovascular:      Rate and Rhythm: Normal rate and regular rhythm.      Pulses: Normal pulses.      Heart sounds: Normal heart sounds.   Pulmonary:      Effort: Pulmonary effort is normal. No respiratory distress.      Breath sounds: Normal breath sounds.   Abdominal:      General: Abdomen is flat.      Palpations: Abdomen is soft.   Musculoskeletal:         General: Normal range of motion.      Cervical  "back: Normal range of motion and neck supple.   Skin:     General: Skin is warm and dry.   Neurological:      General: No focal deficit present.      Mental Status: She is alert and oriented to person, place, and time.   Psychiatric:         Mood and Affect: Mood normal.         Behavior: Behavior normal.         Thought Content: Thought content normal.         Judgment: Judgment normal.          /78   Pulse 80   Ht 157.5 cm (62\")   Wt 49 kg (108 lb)   LMP  (LMP Unknown)   BMI 19.75 kg/m²     Assessment/Plan   Diagnoses and all orders for this visit:    Stenosis of celiac artery (HCC)  -     Ambulatory Referral to Vascular Surgery    Left lower quadrant abdominal pain    Acute gastritis, presence of bleeding unspecified, unspecified gastritis type  -     H. Pylori Antigen, Stool - Stool, Per Rectum      Patient w/ abdominal pain now resolved after treatment for diverticulitis and gastritis. Incidental finding of celiac artery stenosis/ possible arcuate ligament syndrome. She will continue prilosec for one month. Will test for h pylori by stool specimen. She will be seen by vascular to determine if intervention is indicated for celiac stenosis. She will f/u here routinely. Of note, patient also grieving loss of granddaughter. Offered counseling. She will consider.            "

## 2022-01-06 ENCOUNTER — APPOINTMENT (OUTPATIENT)
Dept: WOMENS IMAGING | Facility: HOSPITAL | Age: 72
End: 2022-01-06

## 2022-01-06 PROCEDURE — 77067 SCR MAMMO BI INCL CAD: CPT | Performed by: RADIOLOGY

## 2022-01-06 PROCEDURE — 77063 BREAST TOMOSYNTHESIS BI: CPT | Performed by: RADIOLOGY

## 2022-02-01 RX ORDER — DESONIDE 0.5 MG/G
1 OINTMENT TOPICAL DAILY
Qty: 30 G | Refills: 2 | Status: SHIPPED | OUTPATIENT
Start: 2022-02-01

## 2022-02-04 RX ORDER — OMEPRAZOLE 20 MG/1
CAPSULE, DELAYED RELEASE ORAL
Qty: 30 CAPSULE | Refills: 1 | Status: SHIPPED | OUTPATIENT
Start: 2022-02-04 | End: 2022-03-03 | Stop reason: SDUPTHER

## 2022-03-03 RX ORDER — OMEPRAZOLE 20 MG/1
20 CAPSULE, DELAYED RELEASE ORAL DAILY
Qty: 30 CAPSULE | Refills: 1 | Status: SHIPPED | OUTPATIENT
Start: 2022-03-03 | End: 2022-06-02

## 2022-03-03 RX ORDER — LOSARTAN POTASSIUM 25 MG/1
25 TABLET ORAL DAILY
Qty: 90 TABLET | Refills: 1 | Status: SHIPPED | OUTPATIENT
Start: 2022-03-03 | End: 2022-06-07 | Stop reason: SDUPTHER

## 2022-05-12 DIAGNOSIS — E03.9 HYPOTHYROIDISM, UNSPECIFIED TYPE: Primary | ICD-10-CM

## 2022-05-12 DIAGNOSIS — R79.89 ABNORMAL CBC: ICD-10-CM

## 2022-05-21 LAB
ALBUMIN SERPL-MCNC: 4.8 G/DL (ref 3.8–4.8)
ALBUMIN/GLOB SERPL: 1.8 {RATIO} (ref 1.2–2.2)
ALP SERPL-CCNC: 35 IU/L (ref 44–121)
ALT SERPL-CCNC: 12 IU/L (ref 0–32)
AST SERPL-CCNC: 19 IU/L (ref 0–40)
BASOPHILS # BLD AUTO: 0.1 X10E3/UL (ref 0–0.2)
BASOPHILS NFR BLD AUTO: 1 %
BILIRUB SERPL-MCNC: 0.4 MG/DL (ref 0–1.2)
BUN SERPL-MCNC: 13 MG/DL (ref 8–27)
BUN/CREAT SERPL: 14 (ref 12–28)
CALCIUM SERPL-MCNC: 10.3 MG/DL (ref 8.7–10.3)
CHLORIDE SERPL-SCNC: 99 MMOL/L (ref 96–106)
CO2 SERPL-SCNC: 24 MMOL/L (ref 20–29)
CREAT SERPL-MCNC: 0.95 MG/DL (ref 0.57–1)
EGFRCR SERPLBLD CKD-EPI 2021: 64 ML/MIN/1.73
EOSINOPHIL # BLD AUTO: 0.1 X10E3/UL (ref 0–0.4)
EOSINOPHIL NFR BLD AUTO: 1 %
ERYTHROCYTE [DISTWIDTH] IN BLOOD BY AUTOMATED COUNT: 12.3 % (ref 11.7–15.4)
GLOBULIN SER CALC-MCNC: 2.7 G/DL (ref 1.5–4.5)
GLUCOSE SERPL-MCNC: 89 MG/DL (ref 65–99)
HCT VFR BLD AUTO: 41.1 % (ref 34–46.6)
HGB BLD-MCNC: 13.4 G/DL (ref 11.1–15.9)
IMM GRANULOCYTES # BLD AUTO: 0 X10E3/UL (ref 0–0.1)
IMM GRANULOCYTES NFR BLD AUTO: 0 %
LYMPHOCYTES # BLD AUTO: 2.2 X10E3/UL (ref 0.7–3.1)
LYMPHOCYTES NFR BLD AUTO: 25 %
MCH RBC QN AUTO: 30.7 PG (ref 26.6–33)
MCHC RBC AUTO-ENTMCNC: 32.6 G/DL (ref 31.5–35.7)
MCV RBC AUTO: 94 FL (ref 79–97)
MONOCYTES # BLD AUTO: 0.6 X10E3/UL (ref 0.1–0.9)
MONOCYTES NFR BLD AUTO: 7 %
NEUTROPHILS # BLD AUTO: 5.8 X10E3/UL (ref 1.4–7)
NEUTROPHILS NFR BLD AUTO: 66 %
PLATELET # BLD AUTO: 446 X10E3/UL (ref 150–450)
POTASSIUM SERPL-SCNC: 4.5 MMOL/L (ref 3.5–5.2)
PROT SERPL-MCNC: 7.5 G/DL (ref 6–8.5)
RBC # BLD AUTO: 4.37 X10E6/UL (ref 3.77–5.28)
SODIUM SERPL-SCNC: 142 MMOL/L (ref 134–144)
T4 FREE SERPL-MCNC: 1.27 NG/DL (ref 0.82–1.77)
TSH SERPL DL<=0.005 MIU/L-ACNC: 16 UIU/ML (ref 0.45–4.5)
WBC # BLD AUTO: 8.9 X10E3/UL (ref 3.4–10.8)

## 2022-05-23 DIAGNOSIS — E03.9 HYPOTHYROIDISM, UNSPECIFIED TYPE: Primary | ICD-10-CM

## 2022-06-02 RX ORDER — OMEPRAZOLE 20 MG/1
CAPSULE, DELAYED RELEASE ORAL
Qty: 30 CAPSULE | Refills: 1 | Status: SHIPPED | OUTPATIENT
Start: 2022-06-02 | End: 2022-07-04 | Stop reason: SDUPTHER

## 2022-06-06 DIAGNOSIS — M81.0 AGE-RELATED OSTEOPOROSIS WITHOUT CURRENT PATHOLOGICAL FRACTURE: Primary | ICD-10-CM

## 2022-06-07 RX ORDER — LOSARTAN POTASSIUM 25 MG/1
25 TABLET ORAL DAILY
Qty: 90 TABLET | Refills: 1 | Status: SHIPPED | OUTPATIENT
Start: 2022-06-07 | End: 2022-11-14 | Stop reason: DRUGHIGH

## 2022-06-09 NOTE — NURSING NOTE
Spoke with Martha at Dr. Gutierrez's office with okay given to proceed with Prolia with CMP from 05/20/2022, no additional labs needed.

## 2022-06-10 ENCOUNTER — INFUSION (OUTPATIENT)
Dept: ONCOLOGY | Facility: HOSPITAL | Age: 72
End: 2022-06-10

## 2022-06-10 VITALS — HEIGHT: 62 IN | BODY MASS INDEX: 19.75 KG/M2 | RESPIRATION RATE: 18 BRPM | TEMPERATURE: 96.5 F

## 2022-06-10 DIAGNOSIS — M81.0 AGE-RELATED OSTEOPOROSIS WITHOUT CURRENT PATHOLOGICAL FRACTURE: Primary | ICD-10-CM

## 2022-06-10 PROCEDURE — 96372 THER/PROPH/DIAG INJ SC/IM: CPT

## 2022-06-10 PROCEDURE — 25010000002 DENOSUMAB 60 MG/ML SOLUTION PREFILLED SYRINGE: Performed by: INTERNAL MEDICINE

## 2022-06-10 RX ADMIN — DENOSUMAB 60 MG: 60 INJECTION SUBCUTANEOUS at 14:56

## 2022-06-10 NOTE — NURSING NOTE
Arrived  for prolia injection. Indication and side effects reviewed. Denies recent dental work. Labs and medications verified. Prolia administered in R arm without incidence. Instructed to call prescribing MD for any concerns or questions and instructed on how to schedule future appts.  Pt vu and discharged in stable condition.

## 2022-06-21 RX ORDER — TRAZODONE HYDROCHLORIDE 50 MG/1
50 TABLET ORAL NIGHTLY
Qty: 30 TABLET | Refills: 5 | Status: SHIPPED | OUTPATIENT
Start: 2022-06-21 | End: 2022-12-21

## 2022-07-05 RX ORDER — OMEPRAZOLE 20 MG/1
20 CAPSULE, DELAYED RELEASE ORAL DAILY
Qty: 30 CAPSULE | Refills: 1 | Status: SHIPPED | OUTPATIENT
Start: 2022-07-05 | End: 2022-09-03 | Stop reason: SDUPTHER

## 2022-07-08 DIAGNOSIS — E03.9 HYPOTHYROIDISM, UNSPECIFIED TYPE: Primary | ICD-10-CM

## 2022-07-08 LAB
T4 FREE SERPL-MCNC: 1.38 NG/DL (ref 0.82–1.77)
TSH SERPL DL<=0.005 MIU/L-ACNC: 5.31 UIU/ML (ref 0.45–4.5)

## 2022-09-06 RX ORDER — OMEPRAZOLE 20 MG/1
20 CAPSULE, DELAYED RELEASE ORAL DAILY
Qty: 30 CAPSULE | Refills: 1 | Status: SHIPPED | OUTPATIENT
Start: 2022-09-06 | End: 2022-10-30 | Stop reason: SDUPTHER

## 2022-10-31 RX ORDER — OMEPRAZOLE 20 MG/1
20 CAPSULE, DELAYED RELEASE ORAL DAILY
Qty: 30 CAPSULE | Refills: 1 | Status: SHIPPED | OUTPATIENT
Start: 2022-10-31

## 2022-11-03 DIAGNOSIS — M81.0 AGE-RELATED OSTEOPOROSIS WITHOUT CURRENT PATHOLOGICAL FRACTURE: ICD-10-CM

## 2022-11-03 DIAGNOSIS — E03.9 HYPOTHYROIDISM, UNSPECIFIED TYPE: Primary | ICD-10-CM

## 2022-11-08 LAB
ALBUMIN SERPL-MCNC: 4.3 G/DL (ref 3.5–5.2)
ALBUMIN/GLOB SERPL: 1.6 G/DL
ALP SERPL-CCNC: 41 U/L (ref 39–117)
ALT SERPL-CCNC: 10 U/L (ref 1–33)
APPEARANCE UR: CLEAR
AST SERPL-CCNC: 17 U/L (ref 1–32)
BACTERIA #/AREA URNS HPF: NORMAL /HPF
BASOPHILS # BLD AUTO: 0.13 10*3/MM3 (ref 0–0.2)
BASOPHILS NFR BLD AUTO: 1.3 % (ref 0–1.5)
BILIRUB SERPL-MCNC: 0.3 MG/DL (ref 0–1.2)
BILIRUB UR QL STRIP: NEGATIVE
BUN SERPL-MCNC: 12 MG/DL (ref 8–23)
BUN/CREAT SERPL: 15.6 (ref 7–25)
CALCIUM SERPL-MCNC: 10 MG/DL (ref 8.6–10.5)
CASTS URNS QL MICRO: NORMAL /LPF
CHLORIDE SERPL-SCNC: 101 MMOL/L (ref 98–107)
CHOLEST SERPL-MCNC: 204 MG/DL (ref 0–200)
CO2 SERPL-SCNC: 29 MMOL/L (ref 22–29)
COLOR UR: YELLOW
CREAT SERPL-MCNC: 0.77 MG/DL (ref 0.57–1)
EGFRCR SERPLBLD CKD-EPI 2021: 83.1 ML/MIN/1.73
EOSINOPHIL # BLD AUTO: 0.48 10*3/MM3 (ref 0–0.4)
EOSINOPHIL NFR BLD AUTO: 4.7 % (ref 0.3–6.2)
EPI CELLS #/AREA URNS HPF: NORMAL /HPF (ref 0–10)
ERYTHROCYTE [DISTWIDTH] IN BLOOD BY AUTOMATED COUNT: 12.2 % (ref 12.3–15.4)
GLOBULIN SER CALC-MCNC: 2.7 GM/DL
GLUCOSE SERPL-MCNC: 88 MG/DL (ref 65–99)
GLUCOSE UR QL STRIP: NEGATIVE
HCT VFR BLD AUTO: 38.5 % (ref 34–46.6)
HDLC SERPL-MCNC: 109 MG/DL (ref 40–60)
HGB BLD-MCNC: 12.7 G/DL (ref 12–15.9)
HGB UR QL STRIP: ABNORMAL
IMM GRANULOCYTES # BLD AUTO: 0.08 10*3/MM3 (ref 0–0.05)
IMM GRANULOCYTES NFR BLD AUTO: 0.8 % (ref 0–0.5)
KETONES UR QL STRIP: NEGATIVE
LDLC SERPL CALC-MCNC: 78 MG/DL (ref 0–100)
LEUKOCYTE ESTERASE UR QL STRIP: NEGATIVE
LYMPHOCYTES # BLD AUTO: 2.62 10*3/MM3 (ref 0.7–3.1)
LYMPHOCYTES NFR BLD AUTO: 25.7 % (ref 19.6–45.3)
MCH RBC QN AUTO: 30.2 PG (ref 26.6–33)
MCHC RBC AUTO-ENTMCNC: 33 G/DL (ref 31.5–35.7)
MCV RBC AUTO: 91.4 FL (ref 79–97)
MICRO URNS: ABNORMAL
MONOCYTES # BLD AUTO: 1.08 10*3/MM3 (ref 0.1–0.9)
MONOCYTES NFR BLD AUTO: 10.6 % (ref 5–12)
NEUTROPHILS # BLD AUTO: 5.82 10*3/MM3 (ref 1.7–7)
NEUTROPHILS NFR BLD AUTO: 56.9 % (ref 42.7–76)
NITRITE UR QL STRIP: NEGATIVE
NRBC BLD AUTO-RTO: 0 /100 WBC (ref 0–0.2)
PH UR STRIP: 8 [PH] (ref 5–7.5)
PLATELET # BLD AUTO: 433 10*3/MM3 (ref 140–450)
POTASSIUM SERPL-SCNC: 4.5 MMOL/L (ref 3.5–5.2)
PROT SERPL-MCNC: 7 G/DL (ref 6–8.5)
PROT UR QL STRIP: NEGATIVE
RBC # BLD AUTO: 4.21 10*6/MM3 (ref 3.77–5.28)
RBC #/AREA URNS HPF: NORMAL /HPF (ref 0–2)
SODIUM SERPL-SCNC: 139 MMOL/L (ref 136–145)
SP GR UR STRIP: 1.01 (ref 1–1.03)
TRIGL SERPL-MCNC: 102 MG/DL (ref 0–150)
TSH SERPL DL<=0.005 MIU/L-ACNC: 1.13 UIU/ML (ref 0.27–4.2)
URINALYSIS REFLEX: ABNORMAL
UROBILINOGEN UR STRIP-MCNC: 0.2 MG/DL (ref 0.2–1)
VLDLC SERPL CALC-MCNC: 17 MG/DL (ref 5–40)
WBC # BLD AUTO: 10.21 10*3/MM3 (ref 3.4–10.8)
WBC #/AREA URNS HPF: NORMAL /HPF (ref 0–5)

## 2022-11-14 ENCOUNTER — OFFICE VISIT (OUTPATIENT)
Dept: INTERNAL MEDICINE | Facility: CLINIC | Age: 72
End: 2022-11-14

## 2022-11-14 VITALS
HEART RATE: 68 BPM | SYSTOLIC BLOOD PRESSURE: 148 MMHG | HEIGHT: 62 IN | WEIGHT: 108 LBS | BODY MASS INDEX: 19.88 KG/M2 | DIASTOLIC BLOOD PRESSURE: 82 MMHG

## 2022-11-14 DIAGNOSIS — R10.30 LOWER ABDOMINAL PAIN: ICD-10-CM

## 2022-11-14 DIAGNOSIS — M81.0 AGE-RELATED OSTEOPOROSIS WITHOUT CURRENT PATHOLOGICAL FRACTURE: ICD-10-CM

## 2022-11-14 DIAGNOSIS — Z00.00 HEALTHCARE MAINTENANCE: Primary | ICD-10-CM

## 2022-11-14 DIAGNOSIS — Z12.31 ENCOUNTER FOR SCREENING MAMMOGRAM FOR BREAST CANCER: ICD-10-CM

## 2022-11-14 DIAGNOSIS — Z78.0 MENOPAUSE: ICD-10-CM

## 2022-11-14 DIAGNOSIS — M54.50 ACUTE MIDLINE LOW BACK PAIN WITHOUT SCIATICA: ICD-10-CM

## 2022-11-14 PROCEDURE — 1126F AMNT PAIN NOTED NONE PRSNT: CPT | Performed by: INTERNAL MEDICINE

## 2022-11-14 PROCEDURE — 72100 X-RAY EXAM L-S SPINE 2/3 VWS: CPT | Performed by: INTERNAL MEDICINE

## 2022-11-14 PROCEDURE — 96160 PT-FOCUSED HLTH RISK ASSMT: CPT | Performed by: INTERNAL MEDICINE

## 2022-11-14 PROCEDURE — 1170F FXNL STATUS ASSESSED: CPT | Performed by: INTERNAL MEDICINE

## 2022-11-14 PROCEDURE — 99213 OFFICE O/P EST LOW 20 MIN: CPT | Performed by: INTERNAL MEDICINE

## 2022-11-14 PROCEDURE — G0439 PPPS, SUBSEQ VISIT: HCPCS | Performed by: INTERNAL MEDICINE

## 2022-11-14 PROCEDURE — 1159F MED LIST DOCD IN RCRD: CPT | Performed by: INTERNAL MEDICINE

## 2022-11-14 RX ORDER — DICYCLOMINE HYDROCHLORIDE 10 MG/1
10 CAPSULE ORAL 3 TIMES DAILY PRN
Qty: 20 CAPSULE | Refills: 1 | Status: SHIPPED | OUTPATIENT
Start: 2022-11-14

## 2022-11-14 RX ORDER — HYDROCORTISONE BUTYRATE 1 MG/G
1 OINTMENT TOPICAL EVERY 12 HOURS SCHEDULED
Qty: 45 G | Refills: 1 | Status: SHIPPED | OUTPATIENT
Start: 2022-11-14 | End: 2023-02-20

## 2022-11-14 RX ORDER — LOSARTAN POTASSIUM 50 MG/1
50 TABLET ORAL DAILY
Qty: 90 TABLET | Refills: 1 | Status: SHIPPED | OUTPATIENT
Start: 2022-11-14 | End: 2023-02-13

## 2022-11-14 NOTE — PROGRESS NOTES
"The ABCs of the Annual Wellness Visit  Subsequent Medicare Wellness Visit    Chief Complaint   Patient presents with   • Annual Exam   • Back Pain   • gi upset      Subjective    History of Present Illness:  Eve Hurley is a 72 y.o. female who presents for a Subsequent Medicare Wellness Visit, to review chronic issues, and to discuss acute needs. Patient reports intermittent gi upset. She last experienced this about 3-4 weeks ago. She reports that she remotely was diagnosed w/ \"spastic colon\". She had a colonosocpy 6 years ago which was wnl. 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 She is following w/ vascular surgery.   Patient on omeprazole for mid epigastric pain.   Has osteoporosis and is on prolia for this.     UTD on cscope, mammo, dexa, dental, skin, eye examinations.     The following portions of the patient's history were reviewed and   updated as appropriate: current medications, past family history, past medical history, past social history, past surgical history and problem list.    Compared to one year ago, the patient feels her physical   health is the same.    Compared to one year ago, the patient feels her mental   health is the same.    Recent Hospitalizations:  She was not admitted to the hospital during the last year.       Current Medical Providers:  Patient Care Team:  Gladys Gutierrez MD as PCP - General  Gladys Gutierrez MD as PCP - Family Medicine  Demarcus Dupree MD as Consulting Physician (Endocrinology)    Outpatient Medications Prior to Visit   Medication Sig Dispense Refill   • acetaminophen (TYLENOL) 500 MG tablet Take 1,000 mg by mouth every day.     • calcium carbonate EX (TUMS EX) 750 MG chewable tablet Chew 750 mg 4 (four) times a day.     • cholecalciferol (VITAMIN D3) 10 MCG (400 UNIT) tablet Take  by mouth 3 (Three) Times a Week.     • denosumab (PROLIA) 60 MG/ML solution syringe Inject 60 mg under the " skin every 6 (six) months.     • desonide (DESOWEN) 0.05 % ointment Apply 1 application topically to the appropriate area as directed Daily. 30 g 2   • Flaxseed, Linseed, (FLAXSEED OIL) 1000 MG capsule Take  by mouth.     • levothyroxine (SYNTHROID, LEVOTHROID) 75 MCG tablet Take  by mouth.     • losartan (COZAAR) 25 MG tablet Take 1 tablet by mouth Daily. 90 tablet 1   • NON FORMULARY Take 1 tablet by mouth Daily. Calcium, Vitamin D 1500 mg, Zinc     • omeprazole (priLOSEC) 20 MG capsule Take 1 capsule by mouth Daily. 30 capsule 1   • polycarbophil (FIBERCON) 625 MG tablet Take 625 mg by mouth daily.     • Probiotic Product (PROBIOTIC DAILY PO)      • traZODone (DESYREL) 50 MG tablet Take 1 tablet by mouth Every Night. 30 tablet 5   • vitamin B-12 (CYANOCOBALAMIN) 1000 MCG tablet Take  by mouth.       Facility-Administered Medications Prior to Visit   Medication Dose Route Frequency Provider Last Rate Last Admin   • levalbuterol (XOPENEX) nebulizer solution 0.63 mg  0.63 mg Nebulization Q6H PRN Gladys Gutierrez MD   0.63 mg at 01/13/20 1605       No opioid medication identified on active medication list. I have reviewed chart for other potential  high risk medication/s and harmful drug interactions in the elderly.          Aspirin is not on active medication list.  Aspirin use is not indicated based on review of current medical condition/s. Risk of harm outweighs potential benefits.  .    Patient Active Problem List   Diagnosis   • Hypothyroidism   • Arthralgia of hip   • Osteoporosis   • Hearing difficulty   • Rectal bleeding   • Diverticulitis of intestine without perforation or abscess without bleeding   • Senile osteoporosis   • Abnormal CT of the abdomen   • Buttock trauma   • Hematoma     Advance Care Planning  Advance Directive is on file.  ACP discussion was held with the patient during this visit. Patient has an advance directive in EMR which is still valid.           Objective    Vitals:    11/14/22 0725  "  BP: 148/82   Pulse: 68   Weight: 49 kg (108 lb)   Height: 157.5 cm (62\")   PainSc: 0-No pain     Estimated body mass index is 19.75 kg/m² as calculated from the following:    Height as of this encounter: 157.5 cm (62\").    Weight as of this encounter: 49 kg (108 lb).    BMI is within normal parameters. No other follow-up for BMI required.      Does the patient have evidence of cognitive impairment? No    Physical Exam  Vitals and nursing note reviewed.   Constitutional:       Appearance: Normal appearance.   HENT:      Head: Normocephalic and atraumatic.      Right Ear: Tympanic membrane normal.      Left Ear: Tympanic membrane normal.      Nose: Nose normal.      Mouth/Throat:      Mouth: Mucous membranes are moist.   Eyes:      Extraocular Movements: Extraocular movements intact.      Pupils: Pupils are equal, round, and reactive to light.   Cardiovascular:      Rate and Rhythm: Normal rate and regular rhythm.      Pulses: Normal pulses.   Pulmonary:      Effort: Pulmonary effort is normal.   Abdominal:      General: Abdomen is flat.      Palpations: Abdomen is soft.   Musculoskeletal:         General: Normal range of motion.      Cervical back: Normal range of motion.   Skin:     General: Skin is warm and dry.   Neurological:      General: No focal deficit present.      Mental Status: She is alert and oriented to person, place, and time.   Psychiatric:         Mood and Affect: Mood normal.         Behavior: Behavior normal.         Thought Content: Thought content normal.         Judgment: Judgment normal.       Lab Results   Component Value Date    CHLPL 204 (H) 11/07/2022    TRIG 102 11/07/2022     (H) 11/07/2022    LDL 78 11/07/2022    VLDL 17 11/07/2022            HEALTH RISK ASSESSMENT    Smoking Status:  Social History     Tobacco Use   Smoking Status Former   • Packs/day: 1.00   • Years: 15.00   • Pack years: 15.00   • Types: Cigarettes   Smokeless Tobacco Never     Alcohol Consumption:  Social " History     Substance and Sexual Activity   Alcohol Use Yes   • Alcohol/week: 2.0 standard drinks   • Types: 2 Cans of beer per week     Fall Risk Screen:    ROSANA Fall Risk Assessment was completed, and patient is at LOW risk for falls.Assessment completed on:11/14/2022    Depression Screening:  PHQ-2/PHQ-9 Depression Screening 11/14/2022   Retired PHQ-9 Total Score -   Retired Total Score -   Little Interest or Pleasure in Doing Things 0-->not at all   Feeling Down, Depressed or Hopeless 0-->not at all   PHQ-9: Brief Depression Severity Measure Score 0       Health Habits and Functional and Cognitive Screening:  Functional & Cognitive Status 11/14/2022   Do you have difficulty preparing food and eating? No   Do you have difficulty bathing yourself, getting dressed or grooming yourself? No   Do you have difficulty using the toilet? No   Do you have difficulty moving around from place to place? No   Do you have trouble with steps or getting out of a bed or a chair? No   Current Diet Well Balanced Diet   Dental Exam Up to date   Eye Exam Up to date   Exercise (times per week) -   Current Exercise Activities Include -   Do you need help using the phone?  No   Are you deaf or do you have serious difficulty hearing?  No   Do you need help with transportation? No   Do you need help shopping? No   Do you need help preparing meals?  No   Do you need help with housework?  No   Do you need help with laundry? No   Do you need help taking your medications? No   Do you need help managing money? No   Do you ever drive or ride in a car without wearing a seat belt? No   Have you felt unusual stress, anger or loneliness in the last month? No   Who do you live with? Spouse   If you need help, do you have trouble finding someone available to you? No   Have you been bothered in the last four weeks by sexual problems? No   Do you have difficulty concentrating, remembering or making decisions? No       Age-appropriate Screening  Schedule:  Refer to the list below for future screening recommendations based on patient's age, sex and/or medical conditions. Orders for these recommended tests are listed in the plan section. The patient has been provided with a written plan.    Health Maintenance   Topic Date Due   • DXA SCAN  12/04/2022   • PAP SMEAR  11/02/2023   • MAMMOGRAM  01/06/2024   • TDAP/TD VACCINES (3 - Td or Tdap) 11/02/2030   • INFLUENZA VACCINE  Completed   • ZOSTER VACCINE  Completed              Assessment & Plan   CMS Preventative Services Quick Reference  Risk Factors Identified During Encounter  None Identified  The above risks/problems have been discussed with the patient.  Follow up actions/plans if indicated are seen below in the Assessment/Plan Section.  Pertinent information has been shared with the patient in the After Visit Summary.    Diagnoses and all orders for this visit:    1. Healthcare maintenance (Primary)    2. Age-related osteoporosis without current pathological fracture  -     Cancel: Basic metabolic panel  -     DEXA Bone Density Axial  -     Calcium  -     Celiac Disease Panel  -     Vitamin D 25 hydroxy  -     XR Spine Lumbar 2 or 3 View (In Office)    3. Menopause  -     DEXA Bone Density Axial    4. Encounter for screening mammogram for breast cancer  -     Mammo screening digital tomosynthesis bilateral w CAD; Future    5. Lower abdominal pain  -     Calcium  -     Celiac Disease Panel    6. Acute midline low back pain without sciatica  -     XR Spine Lumbar 2 or 3 View (In Office)    Other orders  -     dicyclomine (BENTYL) 10 MG capsule; Take 1 capsule by mouth 3 (Three) Times a Day As Needed (gi upset).  Dispense: 20 capsule; Refill: 1      Patient w/ acute on chronic gi cramping. Reviewed ct abdomen last year. Will switch from omeprazole to pepcid. She will have xray today for low back pain. Physical therapy is symptoms persist. She will f/u w/ grief counselor given year anniversary loss of  granddaughter. She will have repeat caclcium for prolia inj. To have dexa and mammo. She will have celiac disease testing. Will increase losartan to 50 mg for bp regulation and monitor bp at home. Low sodium diet. Low caffeine. Follow up in 6 month or prn.            Follow Up:   No follow-ups on file.     An After Visit Summary and PPPS were made available to the patient.

## 2022-11-15 LAB
25(OH)D3+25(OH)D2 SERPL-MCNC: 51.5 NG/ML (ref 30–100)
CALCIUM SERPL-MCNC: 10 MG/DL (ref 8.6–10.5)
ENDOMYSIUM IGA SER QL: NEGATIVE
IGA SERPL-MCNC: 151 MG/DL (ref 87–352)
TTG IGA SER-ACNC: <2 U/ML (ref 0–3)

## 2022-11-16 ENCOUNTER — TELEPHONE (OUTPATIENT)
Dept: INTERNAL MEDICINE | Facility: CLINIC | Age: 72
End: 2022-11-16

## 2022-11-16 NOTE — TELEPHONE ENCOUNTER
Pt informed    ----- Message from Gladys Gutierrez MD sent at 11/15/2022  5:24 PM EST -----  Normal calcium. May proceed w/ routine injection. Negative for celiac. Normal/ healthy vitamin d level.  jw

## 2022-11-18 ENCOUNTER — TELEPHONE (OUTPATIENT)
Dept: INTERNAL MEDICINE | Facility: CLINIC | Age: 72
End: 2022-11-18

## 2022-11-18 NOTE — TELEPHONE ENCOUNTER
----- Message from Gladys Gutierrez MD sent at 11/18/2022 12:40 PM EST -----  Xray confirms arthritis in the lumbar spine. How is the patient doing?   jw

## 2022-12-09 ENCOUNTER — DOCUMENTATION (OUTPATIENT)
Dept: ONCOLOGY | Facility: HOSPITAL | Age: 72
End: 2022-12-09

## 2022-12-09 NOTE — NURSING NOTE
Labs done 11/14/22 sufficient for prolia.  No additional labs needed prior to prolia and cancelled in plan.

## 2022-12-12 ENCOUNTER — INFUSION (OUTPATIENT)
Dept: ONCOLOGY | Facility: HOSPITAL | Age: 72
End: 2022-12-12

## 2022-12-12 VITALS
SYSTOLIC BLOOD PRESSURE: 120 MMHG | HEART RATE: 66 BPM | TEMPERATURE: 98.1 F | DIASTOLIC BLOOD PRESSURE: 76 MMHG | OXYGEN SATURATION: 100 %

## 2022-12-12 DIAGNOSIS — M81.0 AGE-RELATED OSTEOPOROSIS WITHOUT CURRENT PATHOLOGICAL FRACTURE: Primary | ICD-10-CM

## 2022-12-12 PROCEDURE — 25010000002 DENOSUMAB 60 MG/ML SOLUTION PREFILLED SYRINGE: Performed by: INTERNAL MEDICINE

## 2022-12-12 PROCEDURE — 96372 THER/PROPH/DIAG INJ SC/IM: CPT

## 2022-12-12 RX ADMIN — DENOSUMAB 60 MG: 60 INJECTION SUBCUTANEOUS at 15:42

## 2022-12-12 NOTE — NURSING NOTE
Arrived  for prolia injection. Indication and side effects reviewed. Denies recent dental work. Labs and medications verified. Prolia administered in  arm without incidence. Instructed to call prescribing MD for any concerns or questions and instructed on how to schedule future appts.  Pt vu and discharged in stable condition.

## 2022-12-21 RX ORDER — TRAZODONE HYDROCHLORIDE 50 MG/1
TABLET ORAL
Qty: 30 TABLET | Refills: 5 | Status: SHIPPED | OUTPATIENT
Start: 2022-12-21

## 2023-01-27 ENCOUNTER — HOSPITAL ENCOUNTER (OUTPATIENT)
Dept: PET IMAGING | Facility: HOSPITAL | Age: 73
Discharge: HOME | End: 2023-01-27
Payer: MEDICARE

## 2023-01-27 ENCOUNTER — APPOINTMENT (OUTPATIENT)
Dept: WOMENS IMAGING | Facility: HOSPITAL | Age: 73
End: 2023-01-27
Payer: MEDICARE

## 2023-01-27 PROCEDURE — 77063 BREAST TOMOSYNTHESIS BI: CPT | Performed by: RADIOLOGY

## 2023-01-27 PROCEDURE — 77080 DXA BONE DENSITY AXIAL: CPT | Performed by: RADIOLOGY

## 2023-01-27 PROCEDURE — 77067 SCR MAMMO BI INCL CAD: CPT | Performed by: RADIOLOGY

## 2023-01-27 PROCEDURE — 77080 DXA BONE DENSITY AXIAL: CPT

## 2023-02-03 ENCOUNTER — TELEPHONE (OUTPATIENT)
Dept: INTERNAL MEDICINE | Facility: CLINIC | Age: 73
End: 2023-02-03
Payer: MEDICARE

## 2023-02-03 NOTE — TELEPHONE ENCOUNTER
----- Message from Gladys Gutierrez MD sent at 2/2/2023  3:11 PM EST -----  Lumbar spine imporvement. Now osteopenia. Continue current management.   jw

## 2023-02-03 NOTE — TELEPHONE ENCOUNTER
----- Message from Gladys Gutierrez MD sent at 2/2/2023  3:05 PM EST -----  Normal mammogram. Repeat in one year.   [facility]

## 2023-02-13 RX ORDER — LOSARTAN POTASSIUM 50 MG/1
50 TABLET ORAL DAILY
Qty: 90 TABLET | Refills: 1 | Status: SHIPPED | OUTPATIENT
Start: 2023-02-13

## 2023-02-20 RX ORDER — HYDROCORTISONE BUTYRATE 1 MG/G
OINTMENT TOPICAL
Qty: 45 G | Refills: 1 | Status: SHIPPED | OUTPATIENT
Start: 2023-02-20

## 2023-05-10 ENCOUNTER — TRANSCRIBE ORDERS (OUTPATIENT)
Dept: ADMINISTRATIVE | Facility: HOSPITAL | Age: 73
End: 2023-05-10
Payer: MEDICARE

## 2023-05-10 DIAGNOSIS — M81.0 OSTEOPOROSIS, POST-MENOPAUSAL: Primary | ICD-10-CM

## 2023-05-15 ENCOUNTER — OFFICE VISIT (OUTPATIENT)
Dept: INTERNAL MEDICINE | Facility: CLINIC | Age: 73
End: 2023-05-15
Payer: MEDICARE

## 2023-05-15 VITALS
WEIGHT: 110 LBS | HEIGHT: 62 IN | BODY MASS INDEX: 20.24 KG/M2 | HEART RATE: 71 BPM | OXYGEN SATURATION: 100 % | SYSTOLIC BLOOD PRESSURE: 124 MMHG | DIASTOLIC BLOOD PRESSURE: 70 MMHG

## 2023-05-15 DIAGNOSIS — M54.30 SCIATIC NERVE PAIN, UNSPECIFIED LATERALITY: Primary | ICD-10-CM

## 2023-05-15 DIAGNOSIS — M25.552 BILATERAL HIP PAIN: ICD-10-CM

## 2023-05-15 DIAGNOSIS — E03.9 HYPOTHYROIDISM, UNSPECIFIED TYPE: ICD-10-CM

## 2023-05-15 DIAGNOSIS — M25.551 BILATERAL HIP PAIN: ICD-10-CM

## 2023-05-15 DIAGNOSIS — I10 HYPERTENSION, UNSPECIFIED TYPE: ICD-10-CM

## 2023-05-15 DIAGNOSIS — M81.0 SENILE OSTEOPOROSIS: ICD-10-CM

## 2023-05-15 LAB — CALCIUM SERPL-MCNC: 10.1 MG/DL (ref 8.6–10.5)

## 2023-05-15 PROCEDURE — 1159F MED LIST DOCD IN RCRD: CPT | Performed by: INTERNAL MEDICINE

## 2023-05-15 PROCEDURE — 99214 OFFICE O/P EST MOD 30 MIN: CPT | Performed by: INTERNAL MEDICINE

## 2023-05-15 PROCEDURE — 1160F RVW MEDS BY RX/DR IN RCRD: CPT | Performed by: INTERNAL MEDICINE

## 2023-05-15 RX ORDER — LOSARTAN POTASSIUM 50 MG/1
50 TABLET ORAL DAILY
Qty: 90 TABLET | Refills: 1 | Status: SHIPPED | OUTPATIENT
Start: 2023-05-15

## 2023-05-15 RX ORDER — AMITRIPTYLINE HYDROCHLORIDE 25 MG/1
25 TABLET, FILM COATED ORAL NIGHTLY
Qty: 90 TABLET | Refills: 1 | Status: SHIPPED | OUTPATIENT
Start: 2023-05-15

## 2023-05-15 NOTE — PROGRESS NOTES
"Chief Complaint   Patient presents with   • Hypertension   • Hypothyroidism   • osteoporosis       History of Present Illness   Eve Hurley is a 73 y.o. female presents for follow up evaluation. She is doing well today. She has hypertension, osteoporosis, hypothryoidism. She is doing well today. Recently to endocrinologist and labs are at goal level. Reviewed today. She is having success with prolia for osteoporosis.   C/O B hip pain. Taking tylenol 2 tabs twice daily w/out notable benefit. Pain located laterally and down the legs equally. \"almost like its electrical\". Pain present back in November and L spine xray confirmed l4/l5 and l5 s/1 facet arthritis.     The following portions of the patient's history were reviewed and updated as appropriate: allergies, current medications, past family history, past medical history, past social history, past surgical history and problem list.  Current Outpatient Medications on File Prior to Visit   Medication Sig Dispense Refill   • acetaminophen (TYLENOL) 500 MG tablet Take 2 tablets by mouth Daily.     • calcium carbonate EX (TUMS EX) 750 MG chewable tablet Chew 1 tablet 4 (Four) Times a Day.     • cholecalciferol (VITAMIN D3) 10 MCG (400 UNIT) tablet Take  by mouth 3 (Three) Times a Week.     • denosumab (PROLIA) 60 MG/ML solution syringe Inject 1 mL under the skin into the appropriate area as directed Every 6 (Six) Months.     • desonide (DESOWEN) 0.05 % ointment Apply 1 application topically to the appropriate area as directed Daily. 30 g 2   • dicyclomine (BENTYL) 10 MG capsule Take 1 capsule by mouth 3 (Three) Times a Day As Needed (gi upset). 20 capsule 1   • Flaxseed, Linseed, (FLAXSEED OIL) 1000 MG capsule Take  by mouth.     • hydrocortisone butyrate (LOCOID) 0.1 % ointment ointment APPLY TO AFFECTED AREA TOPICALLY TWICE DAILY AS DIRECTED 45 g 1   • levothyroxine (SYNTHROID, LEVOTHROID) 75 MCG tablet Take  by mouth.     • losartan (COZAAR) 50 MG tablet Take 1 " tablet by mouth Daily. 90 tablet 1   • NON FORMULARY Take 1 tablet by mouth Daily. Calcium, Vitamin D 1500 mg, Zinc     • omeprazole (priLOSEC) 20 MG capsule Take 1 capsule by mouth Daily. 30 capsule 1   • polycarbophil (FIBERCON) 625 MG tablet Take 1 tablet by mouth Daily.     • Probiotic Product (PROBIOTIC DAILY PO)      • vitamin B-12 (CYANOCOBALAMIN) 1000 MCG tablet Take  by mouth.     • [DISCONTINUED] traZODone (DESYREL) 50 MG tablet TAKE ONE TABLET BY MOUTH EVERY EVENING 30 tablet 5     Current Facility-Administered Medications on File Prior to Visit   Medication Dose Route Frequency Provider Last Rate Last Admin   • levalbuterol (XOPENEX) nebulizer solution 0.63 mg  0.63 mg Nebulization Q6H PRN Gladys Gutierrez MD   0.63 mg at 01/13/20 1605     Review of Systems   Constitutional: Negative.    HENT: Negative.    Eyes: Negative.    Respiratory: Negative.    Cardiovascular: Negative.    Gastrointestinal: Negative.    Endocrine: Negative.    Genitourinary: Negative.    Musculoskeletal: Positive for arthralgias.   Skin: Negative.    Allergic/Immunologic: Negative.    Neurological:        B sciatic nerve pain   Hematological: Negative.    Psychiatric/Behavioral: Negative.        Objective   Physical Exam  Vitals and nursing note reviewed.   Constitutional:       Appearance: Normal appearance. She is well-developed.   HENT:      Head: Normocephalic and atraumatic.      Right Ear: Tympanic membrane and external ear normal.      Left Ear: Tympanic membrane and external ear normal.      Nose: Nose normal.      Mouth/Throat:      Mouth: Mucous membranes are moist.   Eyes:      Extraocular Movements: Extraocular movements intact.      Pupils: Pupils are equal, round, and reactive to light.   Cardiovascular:      Rate and Rhythm: Normal rate and regular rhythm.      Heart sounds: Normal heart sounds.   Pulmonary:      Effort: Pulmonary effort is normal. No respiratory distress.      Breath sounds: Normal breath sounds.  "  Abdominal:      General: Abdomen is flat.      Palpations: Abdomen is soft.   Musculoskeletal:         General: Normal range of motion.      Cervical back: Neck supple.   Skin:     General: Skin is warm and dry.   Neurological:      General: No focal deficit present.      Mental Status: She is alert and oriented to person, place, and time.   Psychiatric:         Mood and Affect: Mood normal.         Behavior: Behavior normal.         Thought Content: Thought content normal.         Judgment: Judgment normal.          /70   Pulse 71   Ht 157.5 cm (62\")   Wt 49.9 kg (110 lb)   LMP  (LMP Unknown)   SpO2 100%   BMI 20.12 kg/m²     Assessment & Plan   Diagnoses and all orders for this visit:    Sciatic nerve pain, unspecified laterality  -     MRI Lumbar Spine Without Contrast; Future  -     Ambulatory Referral to Pain Management    Hypothyroidism, unspecified type    Senile osteoporosis    Hypertension, unspecified type    Bilateral hip pain  -     Ambulatory Referral to Sports Medicine    Other orders  -     amitriptyline (ELAVIL) 25 MG tablet; Take 1 tablet by mouth Every Night.    patient w/ htn. She is normotensive on current meds. Reviewed labs. She is to get a prolia injection and needs calcium testing. Will obtain. She has B hip pain. Bursitis v sciatica v both. Will go to sports med, voltaren gel, stretching rolling massage gun etc. If persists will get MRI and refer to pain management for either epidural v facet joint injections. Trial amitriptyline at night. F/u routinely.            "

## 2023-05-17 ENCOUNTER — OFFICE VISIT (OUTPATIENT)
Dept: SPORTS MEDICINE | Facility: CLINIC | Age: 73
End: 2023-05-17
Payer: MEDICARE

## 2023-05-17 VITALS
HEIGHT: 62 IN | SYSTOLIC BLOOD PRESSURE: 124 MMHG | RESPIRATION RATE: 16 BRPM | DIASTOLIC BLOOD PRESSURE: 60 MMHG | BODY MASS INDEX: 20.24 KG/M2 | OXYGEN SATURATION: 98 % | WEIGHT: 110 LBS | HEART RATE: 68 BPM

## 2023-05-17 DIAGNOSIS — M25.552 GREATER TROCHANTERIC PAIN SYNDROME OF BOTH LOWER EXTREMITIES: ICD-10-CM

## 2023-05-17 DIAGNOSIS — M47.816 FACET ARTHROPATHY, LUMBAR: ICD-10-CM

## 2023-05-17 DIAGNOSIS — M25.552 BILATERAL HIP PAIN: Primary | ICD-10-CM

## 2023-05-17 DIAGNOSIS — M25.551 GREATER TROCHANTERIC PAIN SYNDROME OF BOTH LOWER EXTREMITIES: ICD-10-CM

## 2023-05-17 DIAGNOSIS — M25.551 BILATERAL HIP PAIN: Primary | ICD-10-CM

## 2023-05-17 RX ORDER — TRIAMCINOLONE ACETONIDE 40 MG/ML
40 INJECTION, SUSPENSION INTRA-ARTICULAR; INTRAMUSCULAR
Status: DISCONTINUED | OUTPATIENT
Start: 2023-05-17 | End: 2023-05-17 | Stop reason: HOSPADM

## 2023-05-17 RX ADMIN — TRIAMCINOLONE ACETONIDE 40 MG: 40 INJECTION, SUSPENSION INTRA-ARTICULAR; INTRAMUSCULAR at 11:44

## 2023-05-17 RX ADMIN — TRIAMCINOLONE ACETONIDE 40 MG: 40 INJECTION, SUSPENSION INTRA-ARTICULAR; INTRAMUSCULAR at 11:43

## 2023-05-17 NOTE — PROGRESS NOTES
"Eve is a 73 y.o. year old female presents to [facility]    Chief Complaint   Patient presents with   • Hip Pain     Referral from PCP for evaluation of worsening b/l hip pain with bursitis, RT is worse than the LT - states previous eval with Dr. Stevens with cortisone injection and formal PT - here for discussion/consideration of another cortisone injections - presents with new x-rays for further evaluation and treatment        History of Present Illness  History of same, acute exacerbation.  She has been treated by both my partners in the past for similar condition.  Has been to physical therapy.  She does yoga at least twice weekly and enjoys walking for exercise daily.  Has been limited with doing things such as this due to pain and some sense.  She does notice pain when getting up from seated position as well as when sleeping on either side.  However, when she gets moving she states the pain abates relatively.    Office Visit with Willian Stevens MD (11/09/2020)    I have reviewed the patient's medical, family, and social history in detail and updated the computerized patient record.    /60 (BP Location: Left arm, Patient Position: Sitting, Cuff Size: Adult)   Pulse 68   Resp 16   Ht 157.5 cm (62.01\")   Wt 49.9 kg (110 lb)   LMP  (LMP Unknown)   SpO2 98%   BMI 20.11 kg/m²      Physical Exam    Vital signs reviewed.   General: No acute distress.  Eyes: conjunctiva clear; pupils equally round and reactive  ENT: external ears atraumatic  CV: no peripheral edema  Resp: normal respiratory effort, no use of accessory muscles  Skin: no rashes or wounds; normal turgor  Psych: mood and affect appropriate; recent and remote memory intact  Neuro: sensation to light touch intact    MSK Exam  Bilateral hip demonstrate negative logroll.  Negative FADIR, positive MEJIA bilateral.  Tenderness along the greater trochanter, right greater than left.    XR Spine Lumbar 2 or " 3 View (In Office) (11/17/2022 13:45)  Multilevel facet arthropathy    Bilateral Hip X-Ray  Indication: Pain  AP and Frog Leg views    Findings:  No fracture  No bony lesion  Normal soft tissues  Normal joint spaces    No prior studies were available for comparison.          Large Joint Arthrocentesis: R greater trochanteric bursa  Date/Time: 5/17/2023 11:43 AM  Consent given by: patient  Site marked: site marked  Timeout: Immediately prior to procedure a time out was called to verify the correct patient, procedure, equipment, support staff and site/side marked as required   Supporting Documentation  Indications: pain   Procedure Details  Location: hip - R greater trochanteric bursa  Needle size: 25 G  Approach: lateral  Medications administered: 40 mg triamcinolone acetonide 40 MG/ML  Patient tolerance: patient tolerated the procedure well with no immediate complications    Large Joint Arthrocentesis: L greater trochanteric bursa  Date/Time: 5/17/2023 11:44 AM  Consent given by: patient  Site marked: site marked  Timeout: Immediately prior to procedure a time out was called to verify the correct patient, procedure, equipment, support staff and site/side marked as required   Supporting Documentation  Indications: pain   Procedure Details  Location: hip - L greater trochanteric bursa  Needle size: 25 G  Approach: lateral  Medications administered: 40 mg triamcinolone acetonide 40 MG/ML  Patient tolerance: patient tolerated the procedure well with no immediate complications          Diagnoses and all orders for this visit:    Bilateral hip pain  -     XR Hips Bilateral With or Without Pelvis 2 View    Greater trochanteric pain syndrome of both lower extremities  -     Large Joint Arthrocentesis  -     Large Joint Arthrocentesis    Facet arthropathy, lumbar      Reviewed previous records.  Discussed exam and x-ray with patient.  Discussed treatment options.  Discussed home exercises, therapy.  Given her persistent  symptoms, injections were done as documented above.  No advanced imaging indicated at this time.      Follow Up   No follow-ups on file.  Patient was given instructions and counseling regarding her condition or for health maintenance advice. Please see specific information pulled into the AVS if appropriate.     EMR Dragon/Transcription disclaimer:    Much of this encounter note is an electronic transcription/translation of spoken language to printed text.  The electronic translation of spoken language may permit erroneous, or at times, nonsensical words or phrases to be inadvertently transcribed.  Although I have reviewed the note for such errors some may still exist.

## 2023-05-19 ENCOUNTER — PATIENT ROUNDING (BHMG ONLY) (OUTPATIENT)
Dept: SPORTS MEDICINE | Facility: CLINIC | Age: 73
End: 2023-05-19
Payer: MEDICARE

## 2023-05-19 NOTE — PROGRESS NOTES
May 19, 2023    A [employer] Message has been sent to the patient for PATIENT ROUNDING with [facility]

## 2023-06-06 ENCOUNTER — HOSPITAL ENCOUNTER (OUTPATIENT)
Dept: MRI IMAGING | Facility: HOSPITAL | Age: 73
Discharge: HOME | End: 2023-06-06
Admitting: INTERNAL MEDICINE
Payer: MEDICARE

## 2023-06-06 DIAGNOSIS — M54.30 SCIATIC NERVE PAIN, UNSPECIFIED LATERALITY: ICD-10-CM

## 2023-06-06 PROCEDURE — 72148 MRI LUMBAR SPINE W/O DYE: CPT

## 2023-06-13 ENCOUNTER — TELEPHONE (OUTPATIENT)
Dept: INTERNAL MEDICINE | Facility: CLINIC | Age: 73
End: 2023-06-13
Payer: MEDICARE

## 2023-06-13 DIAGNOSIS — M81.0 AGE-RELATED OSTEOPOROSIS WITHOUT CURRENT PATHOLOGICAL FRACTURE: Primary | ICD-10-CM

## 2023-06-13 DIAGNOSIS — M81.0 AGE RELATED OSTEOPOROSIS, UNSPECIFIED PATHOLOGICAL FRACTURE PRESENCE: Primary | ICD-10-CM

## 2023-06-13 NOTE — TELEPHONE ENCOUNTER
[facility] calling on behalf of mutual pt- they state that they had sent over some paperwork for Dr. Gutierrez to sign and send back over so that pt can get her Prolia Injection tomorrow, 06/13, at 8:00 a.m.

## 2023-06-14 ENCOUNTER — INFUSION (OUTPATIENT)
Dept: ONCOLOGY | Facility: HOSPITAL | Age: 73
End: 2023-06-14
Payer: MEDICARE

## 2023-06-14 DIAGNOSIS — M81.0 AGE-RELATED OSTEOPOROSIS WITHOUT CURRENT PATHOLOGICAL FRACTURE: Primary | ICD-10-CM

## 2023-06-14 PROCEDURE — 96372 THER/PROPH/DIAG INJ SC/IM: CPT

## 2023-06-14 PROCEDURE — 25010000002 DENOSUMAB 60 MG/ML SOLUTION PREFILLED SYRINGE: Performed by: INTERNAL MEDICINE

## 2023-06-14 RX ADMIN — DENOSUMAB 60 MG: 60 INJECTION SUBCUTANEOUS at 08:55

## 2023-06-14 NOTE — NURSING NOTE
Labs done sufficient for prolia; calcium 10.1 on 5/15/2023 per Dr. JOSE Gutierrez.    Arrived  for prolia injection. Indication and side effects reviewed. Denies recent dental work. Labs and medications verified. Prolia administered in left arm without incidence. Instructed to call prescribing MD for any concerns or questions and instructed on how to schedule future appts.  Pt vu and discharged in stable condition.

## 2023-06-19 RX ORDER — HYDROCORTISONE BUTYRATE 1 MG/G
OINTMENT TOPICAL EVERY 12 HOURS PRN
Qty: 45 G | Refills: 1 | Status: SHIPPED | OUTPATIENT
Start: 2023-06-19

## 2023-09-08 ENCOUNTER — PATIENT MESSAGE (OUTPATIENT)
Dept: SPORTS MEDICINE | Facility: CLINIC | Age: 73
End: 2023-09-08
Payer: MEDICARE

## 2023-09-08 DIAGNOSIS — M25.551 BILATERAL HIP PAIN: ICD-10-CM

## 2023-09-08 DIAGNOSIS — M25.552 GREATER TROCHANTERIC PAIN SYNDROME OF BOTH LOWER EXTREMITIES: Primary | ICD-10-CM

## 2023-09-08 DIAGNOSIS — M25.552 BILATERAL HIP PAIN: ICD-10-CM

## 2023-09-08 DIAGNOSIS — M25.551 GREATER TROCHANTERIC PAIN SYNDROME OF BOTH LOWER EXTREMITIES: Primary | ICD-10-CM

## 2023-09-08 NOTE — TELEPHONE ENCOUNTER
Please advise if okay to place new referral to physical therapy or if patient shoulder be scheduled for follow up?

## 2024-03-09 DIAGNOSIS — I77.4 CELIAC ARTERY COMPRESSION SYNDROME: Primary | ICD-10-CM

## 2024-05-22 ENCOUNTER — TELEPHONE (OUTPATIENT)
Age: 74
End: 2024-05-22

## 2024-05-22 NOTE — TELEPHONE ENCOUNTER
Patient called stating that she is switching providers and would like her last US results sent to Dr. Gerald Cheadle with [facility].

## (undated) DEVICE — TUBING, SUCTION, 1/4" X 10', STRAIGHT: Brand: MEDLINE

## (undated) DEVICE — TBG 02 CRUSH RESIST LF CLR 7FT

## (undated) DEVICE — Device: Brand: DEFENDO AIR/WATER/SUCTION AND BIOPSY VALVE

## (undated) DEVICE — THE TORRENT IRRIGATION SCOPE CONNECTOR IS USED WITH THE TORRENT IRRIGATION TUBING TO PROVIDE IRRIGATION FLUIDS SUCH AS STERILE WATER DURING GASTROINTESTINAL ENDOSCOPIC PROCEDURES WHEN USED IN CONJUNCTION WITH AN IRRIGATION PUMP (OR ELECTROSURGICAL UNIT).: Brand: TORRENT

## (undated) DEVICE — CANNULA,ADULT,SOFT-TOUCH,7TUBE,SC: Brand: MEDLINE